# Patient Record
Sex: MALE | Race: WHITE | Employment: UNEMPLOYED | URBAN - METROPOLITAN AREA
[De-identification: names, ages, dates, MRNs, and addresses within clinical notes are randomized per-mention and may not be internally consistent; named-entity substitution may affect disease eponyms.]

---

## 2017-07-14 PROBLEM — M62.82 NON-TRAUMATIC RHABDOMYOLYSIS: Status: ACTIVE | Noted: 2017-07-14

## 2017-07-14 PROBLEM — E87.5 HYPERKALEMIA: Status: ACTIVE | Noted: 2017-07-14

## 2017-07-14 PROBLEM — F14.10 COCAINE ABUSE (HCC): Status: ACTIVE | Noted: 2017-07-14

## 2017-07-14 PROBLEM — F11.10 HEROIN ABUSE (HCC): Status: ACTIVE | Noted: 2017-07-14

## 2017-07-14 PROBLEM — N17.9 ACUTE KIDNEY INJURY (HCC): Status: ACTIVE | Noted: 2017-07-14

## 2020-01-01 ENCOUNTER — HOSPITAL ENCOUNTER (EMERGENCY)
Age: 50
Discharge: HOME | End: 2020-12-04
Payer: MEDICAID

## 2020-01-01 ENCOUNTER — HOSPITAL ENCOUNTER (OUTPATIENT)
Dept: RADIATION ONCOLOGY | Age: 50
Discharge: HOME | End: 2020-12-23
Payer: MEDICAID

## 2020-01-01 ENCOUNTER — HOSPITAL ENCOUNTER (OUTPATIENT)
Dept: RADIATION ONCOLOGY | Age: 50
Discharge: HOME | End: 2020-12-29
Attending: RADIOLOGY
Payer: MEDICAID

## 2020-01-01 ENCOUNTER — HOSPITAL ENCOUNTER (OUTPATIENT)
Dept: RADIATION ONCOLOGY | Age: 50
Discharge: HOME | End: 2020-12-23
Attending: RADIOLOGY
Payer: MEDICAID

## 2020-01-01 ENCOUNTER — APPOINTMENT (OUTPATIENT)
Dept: GENERAL RADIOLOGY | Age: 50
DRG: 136 | End: 2020-01-01
Payer: MEDICAID

## 2020-01-01 ENCOUNTER — HOSPITAL ENCOUNTER (OUTPATIENT)
Dept: RADIATION ONCOLOGY | Age: 50
Discharge: HOME | End: 2020-12-30
Attending: RADIOLOGY
Payer: MEDICAID

## 2020-01-01 ENCOUNTER — HOSPITAL ENCOUNTER (OUTPATIENT)
Dept: RADIATION ONCOLOGY | Age: 50
Discharge: HOME | End: 2020-12-22
Attending: RADIOLOGY
Payer: MEDICAID

## 2020-01-01 ENCOUNTER — APPOINTMENT (OUTPATIENT)
Dept: NUCLEAR MEDICINE | Age: 50
DRG: 136 | End: 2020-01-01
Payer: MEDICAID

## 2020-01-01 ENCOUNTER — APPOINTMENT (OUTPATIENT)
Dept: CT IMAGING | Age: 50
DRG: 136 | End: 2020-01-01
Payer: MEDICAID

## 2020-01-01 ENCOUNTER — HOSPITAL ENCOUNTER (OUTPATIENT)
Dept: RADIATION ONCOLOGY | Age: 50
Discharge: HOME | End: 2020-12-30
Payer: MEDICAID

## 2020-01-01 ENCOUNTER — HOSPITAL ENCOUNTER (INPATIENT)
Age: 50
LOS: 20 days | Discharge: HOSPICE | DRG: 136 | End: 2021-01-05
Attending: EMERGENCY MEDICINE | Admitting: INTERNAL MEDICINE
Payer: MEDICAID

## 2020-01-01 ENCOUNTER — ANESTHESIA (OUTPATIENT)
Dept: ENDOSCOPY | Age: 50
DRG: 136 | End: 2020-01-01
Payer: MEDICAID

## 2020-01-01 ENCOUNTER — APPOINTMENT (OUTPATIENT)
Dept: MRI IMAGING | Age: 50
DRG: 136 | End: 2020-01-01
Payer: MEDICAID

## 2020-01-01 ENCOUNTER — ANESTHESIA EVENT (OUTPATIENT)
Dept: ENDOSCOPY | Age: 50
DRG: 136 | End: 2020-01-01
Payer: MEDICAID

## 2020-01-01 ENCOUNTER — HOSPITAL ENCOUNTER (OUTPATIENT)
Dept: RADIATION ONCOLOGY | Age: 50
Discharge: HOME | End: 2020-12-21
Payer: MEDICAID

## 2020-01-01 ENCOUNTER — TELEPHONE (OUTPATIENT)
Dept: CASE MANAGEMENT | Age: 50
End: 2020-01-01

## 2020-01-01 ENCOUNTER — HOSPITAL ENCOUNTER (OUTPATIENT)
Dept: RADIATION ONCOLOGY | Age: 50
Discharge: HOME | End: 2020-12-21
Attending: RADIOLOGY
Payer: MEDICAID

## 2020-01-01 ENCOUNTER — HOSPITAL ENCOUNTER (OUTPATIENT)
Dept: RADIATION ONCOLOGY | Age: 50
Discharge: HOME | End: 2020-12-31
Attending: RADIOLOGY
Payer: MEDICAID

## 2020-01-01 ENCOUNTER — HOSPITAL ENCOUNTER (OUTPATIENT)
Dept: RADIATION ONCOLOGY | Age: 50
Discharge: HOME | End: 2020-12-28
Attending: RADIOLOGY
Payer: MEDICAID

## 2020-01-01 VITALS
DIASTOLIC BLOOD PRESSURE: 84 MMHG | TEMPERATURE: 97.6 F | RESPIRATION RATE: 22 BRPM | SYSTOLIC BLOOD PRESSURE: 162 MMHG | HEART RATE: 62 BPM

## 2020-01-01 VITALS
TEMPERATURE: 97.1 F | DIASTOLIC BLOOD PRESSURE: 78 MMHG | RESPIRATION RATE: 16 BRPM | OXYGEN SATURATION: 98 % | SYSTOLIC BLOOD PRESSURE: 139 MMHG | HEART RATE: 88 BPM | HEIGHT: 67 IN | WEIGHT: 212 LBS | BODY MASS INDEX: 33.27 KG/M2

## 2020-01-01 VITALS — TEMPERATURE: 95.5 F | SYSTOLIC BLOOD PRESSURE: 114 MMHG | OXYGEN SATURATION: 99 % | DIASTOLIC BLOOD PRESSURE: 68 MMHG

## 2020-01-01 DIAGNOSIS — R91.8 LUNG MASS: ICD-10-CM

## 2020-01-01 DIAGNOSIS — C79.51 MALIGNANT NEOPLASM METASTATIC TO BONE (HCC): Primary | ICD-10-CM

## 2020-01-01 DIAGNOSIS — T15.90XA FOREIGN BODY, EYE, UNSPECIFIED LATERALITY, INITIAL ENCOUNTER: ICD-10-CM

## 2020-01-01 DIAGNOSIS — C79.51 SECONDARY CANCER OF BONE (HCC): Primary | ICD-10-CM

## 2020-01-01 DIAGNOSIS — M84.58XA PATHOLOGICAL FRACTURE OF VERTEBRA DUE TO NEOPLASTIC DISEASE, INITIAL ENCOUNTER: ICD-10-CM

## 2020-01-01 LAB
ACETAMINOPHEN LEVEL: <5 MCG/ML (ref 10–30)
ALBUMIN SERPL-MCNC: 3 G/DL (ref 3.5–5.2)
ALBUMIN SERPL-MCNC: 3.1 G/DL (ref 3.5–5.2)
ALBUMIN SERPL-MCNC: 3.5 G/DL (ref 3.5–5.2)
ALBUMIN SERPL-MCNC: 3.5 G/DL (ref 3.5–5.2)
ALBUMIN SERPL-MCNC: 3.6 G/DL (ref 3.5–5.2)
ALBUMIN SERPL-MCNC: 3.7 G/DL (ref 3.5–5.2)
ALBUMIN SERPL-MCNC: 3.8 G/DL (ref 3.5–5.2)
ALBUMIN SERPL-MCNC: 3.9 G/DL (ref 3.5–5.2)
ALP BLD-CCNC: 64 U/L (ref 40–129)
ALP BLD-CCNC: 65 U/L (ref 40–129)
ALP BLD-CCNC: 66 U/L (ref 40–129)
ALP BLD-CCNC: 67 U/L (ref 40–129)
ALP BLD-CCNC: 67 U/L (ref 40–129)
ALP BLD-CCNC: 68 U/L (ref 40–129)
ALP BLD-CCNC: 68 U/L (ref 40–129)
ALP BLD-CCNC: 69 U/L (ref 40–129)
ALP BLD-CCNC: 71 U/L (ref 40–129)
ALP BLD-CCNC: 71 U/L (ref 40–129)
ALP BLD-CCNC: 73 U/L (ref 40–129)
ALP BLD-CCNC: 73 U/L (ref 40–129)
ALP BLD-CCNC: 76 U/L (ref 40–129)
ALP BLD-CCNC: 77 U/L (ref 40–129)
ALT SERPL-CCNC: 105 U/L (ref 0–40)
ALT SERPL-CCNC: 125 U/L (ref 0–40)
ALT SERPL-CCNC: 148 U/L (ref 0–40)
ALT SERPL-CCNC: 153 U/L (ref 0–40)
ALT SERPL-CCNC: 162 U/L (ref 0–40)
ALT SERPL-CCNC: 176 U/L (ref 0–40)
ALT SERPL-CCNC: 19 U/L (ref 0–40)
ALT SERPL-CCNC: 19 U/L (ref 0–40)
ALT SERPL-CCNC: 24 U/L (ref 0–40)
ALT SERPL-CCNC: 24 U/L (ref 0–40)
ALT SERPL-CCNC: 25 U/L (ref 0–40)
ALT SERPL-CCNC: 25 U/L (ref 0–40)
ALT SERPL-CCNC: 27 U/L (ref 0–40)
ALT SERPL-CCNC: 33 U/L (ref 0–40)
ALT SERPL-CCNC: 48 U/L (ref 0–40)
ALT SERPL-CCNC: 49 U/L (ref 0–40)
ALT SERPL-CCNC: 51 U/L (ref 0–40)
ALT SERPL-CCNC: 54 U/L (ref 0–40)
ALT SERPL-CCNC: 57 U/L (ref 0–40)
AMORPHOUS: ABNORMAL
AMPHETAMINE SCREEN, URINE: NOT DETECTED
ANION GAP SERPL CALCULATED.3IONS-SCNC: 10 MMOL/L (ref 7–16)
ANION GAP SERPL CALCULATED.3IONS-SCNC: 11 MMOL/L (ref 7–16)
ANION GAP SERPL CALCULATED.3IONS-SCNC: 12 MMOL/L (ref 7–16)
ANION GAP SERPL CALCULATED.3IONS-SCNC: 12 MMOL/L (ref 7–16)
ANION GAP SERPL CALCULATED.3IONS-SCNC: 13 MMOL/L (ref 7–16)
ANION GAP SERPL CALCULATED.3IONS-SCNC: 14 MMOL/L (ref 7–16)
ANION GAP SERPL CALCULATED.3IONS-SCNC: 15 MMOL/L (ref 7–16)
ANION GAP SERPL CALCULATED.3IONS-SCNC: 6 MMOL/L (ref 7–16)
ANION GAP SERPL CALCULATED.3IONS-SCNC: 7 MMOL/L (ref 7–16)
ANION GAP SERPL CALCULATED.3IONS-SCNC: 7 MMOL/L (ref 7–16)
ANION GAP SERPL CALCULATED.3IONS-SCNC: 8 MMOL/L (ref 7–16)
ANION GAP SERPL CALCULATED.3IONS-SCNC: 8 MMOL/L (ref 7–16)
ANION GAP SERPL CALCULATED.3IONS-SCNC: 9 MMOL/L (ref 7–16)
ANISOCYTOSIS: ABNORMAL
AST SERPL-CCNC: 102 U/L (ref 0–39)
AST SERPL-CCNC: 13 U/L (ref 0–39)
AST SERPL-CCNC: 14 U/L (ref 0–39)
AST SERPL-CCNC: 17 U/L (ref 0–39)
AST SERPL-CCNC: 19 U/L (ref 0–39)
AST SERPL-CCNC: 23 U/L (ref 0–39)
AST SERPL-CCNC: 27 U/L (ref 0–39)
AST SERPL-CCNC: 28 U/L (ref 0–39)
AST SERPL-CCNC: 28 U/L (ref 0–39)
AST SERPL-CCNC: 32 U/L (ref 0–39)
AST SERPL-CCNC: 33 U/L (ref 0–39)
AST SERPL-CCNC: 36 U/L (ref 0–39)
AST SERPL-CCNC: 37 U/L (ref 0–39)
AST SERPL-CCNC: 40 U/L (ref 0–39)
AST SERPL-CCNC: 40 U/L (ref 0–39)
AST SERPL-CCNC: 44 U/L (ref 0–39)
AST SERPL-CCNC: 52 U/L (ref 0–39)
AST SERPL-CCNC: 68 U/L (ref 0–39)
AST SERPL-CCNC: 90 U/L (ref 0–39)
BACTERIA: ABNORMAL /HPF
BARBITURATE SCREEN URINE: NOT DETECTED
BASOPHILS ABSOLUTE: 0 E9/L (ref 0–0.2)
BASOPHILS ABSOLUTE: 0.01 E9/L (ref 0–0.2)
BASOPHILS ABSOLUTE: 0.02 E9/L (ref 0–0.2)
BASOPHILS ABSOLUTE: 0.03 E9/L (ref 0–0.2)
BASOPHILS ABSOLUTE: 0.04 E9/L (ref 0–0.2)
BASOPHILS ABSOLUTE: 0.05 E9/L (ref 0–0.2)
BASOPHILS ABSOLUTE: 0.06 E9/L (ref 0–0.2)
BASOPHILS ABSOLUTE: 0.07 E9/L (ref 0–0.2)
BASOPHILS ABSOLUTE: 0.15 E9/L (ref 0–0.2)
BASOPHILS RELATIVE PERCENT: 0 % (ref 0–2)
BASOPHILS RELATIVE PERCENT: 0.1 % (ref 0–2)
BASOPHILS RELATIVE PERCENT: 0.2 % (ref 0–2)
BASOPHILS RELATIVE PERCENT: 0.3 % (ref 0–2)
BASOPHILS RELATIVE PERCENT: 0.3 % (ref 0–2)
BASOPHILS RELATIVE PERCENT: 0.4 % (ref 0–2)
BASOPHILS RELATIVE PERCENT: 0.5 % (ref 0–2)
BASOPHILS RELATIVE PERCENT: 0.5 % (ref 0–2)
BENZODIAZEPINE SCREEN, URINE: NOT DETECTED
BILIRUB SERPL-MCNC: 0.4 MG/DL (ref 0–1.2)
BILIRUB SERPL-MCNC: 0.5 MG/DL (ref 0–1.2)
BILIRUB SERPL-MCNC: 0.6 MG/DL (ref 0–1.2)
BILIRUB SERPL-MCNC: 0.7 MG/DL (ref 0–1.2)
BILIRUB SERPL-MCNC: 0.9 MG/DL (ref 0–1.2)
BILIRUBIN URINE: NEGATIVE
BLOOD CULTURE, ROUTINE: NORMAL
BLOOD, URINE: NEGATIVE
BUN BLDV-MCNC: 20 MG/DL (ref 6–20)
BUN BLDV-MCNC: 21 MG/DL (ref 6–20)
BUN BLDV-MCNC: 23 MG/DL (ref 6–20)
BUN BLDV-MCNC: 26 MG/DL (ref 6–20)
BUN BLDV-MCNC: 27 MG/DL (ref 6–20)
BUN BLDV-MCNC: 28 MG/DL (ref 6–20)
BUN BLDV-MCNC: 28 MG/DL (ref 6–20)
BUN BLDV-MCNC: 29 MG/DL (ref 6–20)
BUN BLDV-MCNC: 29 MG/DL (ref 6–20)
BUN BLDV-MCNC: 30 MG/DL (ref 6–20)
BUN BLDV-MCNC: 31 MG/DL (ref 6–20)
BUN BLDV-MCNC: 32 MG/DL (ref 6–20)
BUN BLDV-MCNC: 32 MG/DL (ref 6–20)
BUN BLDV-MCNC: 33 MG/DL (ref 6–20)
BUN BLDV-MCNC: 34 MG/DL (ref 6–20)
BUN BLDV-MCNC: 37 MG/DL (ref 6–20)
BUN BLDV-MCNC: 40 MG/DL (ref 6–20)
BUN BLDV-MCNC: 41 MG/DL (ref 6–20)
BURR CELLS: ABNORMAL
C-REACTIVE PROTEIN: 4.6 MG/DL (ref 0–0.4)
CA 19-9: 123 U/ML (ref 0–37)
CALCIUM IONIZED: 1.44 MMOL/L (ref 1.15–1.33)
CALCIUM SERPL-MCNC: 10 MG/DL (ref 8.6–10.2)
CALCIUM SERPL-MCNC: 10 MG/DL (ref 8.6–10.2)
CALCIUM SERPL-MCNC: 10.1 MG/DL (ref 8.6–10.2)
CALCIUM SERPL-MCNC: 10.2 MG/DL (ref 8.6–10.2)
CALCIUM SERPL-MCNC: 10.3 MG/DL (ref 8.6–10.2)
CALCIUM SERPL-MCNC: 10.3 MG/DL (ref 8.6–10.2)
CALCIUM SERPL-MCNC: 10.4 MG/DL (ref 8.6–10.2)
CALCIUM SERPL-MCNC: 10.6 MG/DL (ref 8.6–10.2)
CALCIUM SERPL-MCNC: 10.8 MG/DL (ref 8.6–10.2)
CALCIUM SERPL-MCNC: 11 MG/DL (ref 8.6–10.2)
CALCIUM SERPL-MCNC: 11.1 MG/DL (ref 8.6–10.2)
CALCIUM SERPL-MCNC: 9.6 MG/DL (ref 8.6–10.2)
CALCIUM SERPL-MCNC: 9.8 MG/DL (ref 8.6–10.2)
CALCIUM SERPL-MCNC: 9.9 MG/DL (ref 8.6–10.2)
CANNABINOID SCREEN URINE: NOT DETECTED
CEA: 158.7 NG/ML (ref 0–5.2)
CHLORIDE BLD-SCNC: 100 MMOL/L (ref 98–107)
CHLORIDE BLD-SCNC: 100 MMOL/L (ref 98–107)
CHLORIDE BLD-SCNC: 91 MMOL/L (ref 98–107)
CHLORIDE BLD-SCNC: 91 MMOL/L (ref 98–107)
CHLORIDE BLD-SCNC: 92 MMOL/L (ref 98–107)
CHLORIDE BLD-SCNC: 93 MMOL/L (ref 98–107)
CHLORIDE BLD-SCNC: 94 MMOL/L (ref 98–107)
CHLORIDE BLD-SCNC: 95 MMOL/L (ref 98–107)
CHLORIDE BLD-SCNC: 96 MMOL/L (ref 98–107)
CHLORIDE BLD-SCNC: 99 MMOL/L (ref 98–107)
CHROMOGRANIN A: 161 NG/ML (ref 0–103)
CLARITY: ABNORMAL
CO2: 22 MMOL/L (ref 22–29)
CO2: 25 MMOL/L (ref 22–29)
CO2: 26 MMOL/L (ref 22–29)
CO2: 27 MMOL/L (ref 22–29)
CO2: 27 MMOL/L (ref 22–29)
CO2: 28 MMOL/L (ref 22–29)
CO2: 29 MMOL/L (ref 22–29)
CO2: 30 MMOL/L (ref 22–29)
CO2: 31 MMOL/L (ref 22–29)
CO2: 32 MMOL/L (ref 22–29)
CO2: 33 MMOL/L (ref 22–29)
CO2: 34 MMOL/L (ref 22–29)
CO2: 37 MMOL/L (ref 22–29)
COCAINE METABOLITE SCREEN URINE: NOT DETECTED
COLOR: YELLOW
CREAT SERPL-MCNC: 0.7 MG/DL (ref 0.7–1.2)
CREAT SERPL-MCNC: 0.7 MG/DL (ref 0.7–1.2)
CREAT SERPL-MCNC: 0.8 MG/DL (ref 0.7–1.2)
CREAT SERPL-MCNC: 0.9 MG/DL (ref 0.7–1.2)
CREAT SERPL-MCNC: 1 MG/DL (ref 0.7–1.2)
CREAT SERPL-MCNC: 1.1 MG/DL (ref 0.7–1.2)
CREAT SERPL-MCNC: 1.2 MG/DL (ref 0.7–1.2)
CULTURE, BLOOD 2: NORMAL
CULTURE, RESPIRATORY: NORMAL
EKG ATRIAL RATE: 100 BPM
EKG ATRIAL RATE: 101 BPM
EKG ATRIAL RATE: 142 BPM
EKG P AXIS: 58 DEGREES
EKG P AXIS: 63 DEGREES
EKG P AXIS: 73 DEGREES
EKG P-R INTERVAL: 116 MS
EKG P-R INTERVAL: 116 MS
EKG P-R INTERVAL: 134 MS
EKG Q-T INTERVAL: 282 MS
EKG Q-T INTERVAL: 324 MS
EKG Q-T INTERVAL: 326 MS
EKG QRS DURATION: 90 MS
EKG QTC CALCULATION (BAZETT): 420 MS
EKG QTC CALCULATION (BAZETT): 420 MS
EKG QTC CALCULATION (BAZETT): 433 MS
EKG R AXIS: 64 DEGREES
EKG R AXIS: 66 DEGREES
EKG R AXIS: 85 DEGREES
EKG T AXIS: 44 DEGREES
EKG T AXIS: 52 DEGREES
EKG T AXIS: 59 DEGREES
EKG VENTRICULAR RATE: 100 BPM
EKG VENTRICULAR RATE: 101 BPM
EKG VENTRICULAR RATE: 142 BPM
EOSINOPHILS ABSOLUTE: 0 E9/L (ref 0.05–0.5)
EOSINOPHILS ABSOLUTE: 0.01 E9/L (ref 0.05–0.5)
EOSINOPHILS ABSOLUTE: 0.02 E9/L (ref 0.05–0.5)
EOSINOPHILS ABSOLUTE: 0.1 E9/L (ref 0.05–0.5)
EOSINOPHILS ABSOLUTE: 0.15 E9/L (ref 0.05–0.5)
EOSINOPHILS ABSOLUTE: 0.22 E9/L (ref 0.05–0.5)
EOSINOPHILS ABSOLUTE: 0.29 E9/L (ref 0.05–0.5)
EOSINOPHILS RELATIVE PERCENT: 0 % (ref 0–6)
EOSINOPHILS RELATIVE PERCENT: 0.1 % (ref 0–6)
EOSINOPHILS RELATIVE PERCENT: 0.1 % (ref 0–6)
EOSINOPHILS RELATIVE PERCENT: 0.3 % (ref 0–6)
EOSINOPHILS RELATIVE PERCENT: 0.6 % (ref 0–6)
EOSINOPHILS RELATIVE PERCENT: 0.8 % (ref 0–6)
EOSINOPHILS RELATIVE PERCENT: 0.9 % (ref 0–6)
EOSINOPHILS RELATIVE PERCENT: 1.5 % (ref 0–6)
EOSINOPHILS RELATIVE PERCENT: 1.7 % (ref 0–6)
EPITHELIAL CELLS, UA: ABNORMAL /HPF
ETHANOL: <10 MG/DL (ref 0–0.08)
FENTANYL SCREEN, URINE: NOT DETECTED
GFR AFRICAN AMERICAN: >60
GFR NON-AFRICAN AMERICAN: >60 ML/MIN/1.73
GLUCOSE BLD-MCNC: 100 MG/DL (ref 74–99)
GLUCOSE BLD-MCNC: 100 MG/DL (ref 74–99)
GLUCOSE BLD-MCNC: 103 MG/DL (ref 74–99)
GLUCOSE BLD-MCNC: 103 MG/DL (ref 74–99)
GLUCOSE BLD-MCNC: 105 MG/DL (ref 74–99)
GLUCOSE BLD-MCNC: 115 MG/DL (ref 74–99)
GLUCOSE BLD-MCNC: 115 MG/DL (ref 74–99)
GLUCOSE BLD-MCNC: 118 MG/DL (ref 74–99)
GLUCOSE BLD-MCNC: 122 MG/DL (ref 74–99)
GLUCOSE BLD-MCNC: 130 MG/DL (ref 74–99)
GLUCOSE BLD-MCNC: 132 MG/DL (ref 74–99)
GLUCOSE BLD-MCNC: 136 MG/DL (ref 74–99)
GLUCOSE BLD-MCNC: 138 MG/DL (ref 74–99)
GLUCOSE BLD-MCNC: 152 MG/DL (ref 74–99)
GLUCOSE BLD-MCNC: 158 MG/DL (ref 74–99)
GLUCOSE BLD-MCNC: 85 MG/DL (ref 74–99)
GLUCOSE BLD-MCNC: 91 MG/DL (ref 74–99)
GLUCOSE BLD-MCNC: 95 MG/DL (ref 74–99)
GLUCOSE BLD-MCNC: 98 MG/DL (ref 74–99)
GLUCOSE BLD-MCNC: 98 MG/DL (ref 74–99)
GLUCOSE URINE: NEGATIVE MG/DL
GRAM STAIN ORDERABLE: NORMAL
HCT VFR BLD CALC: 35.1 % (ref 37–54)
HCT VFR BLD CALC: 36.1 % (ref 37–54)
HCT VFR BLD CALC: 36.7 % (ref 37–54)
HCT VFR BLD CALC: 36.8 % (ref 37–54)
HCT VFR BLD CALC: 36.9 % (ref 37–54)
HCT VFR BLD CALC: 37 % (ref 37–54)
HCT VFR BLD CALC: 37.1 % (ref 37–54)
HCT VFR BLD CALC: 37.1 % (ref 37–54)
HCT VFR BLD CALC: 37.7 % (ref 37–54)
HCT VFR BLD CALC: 38.1 % (ref 37–54)
HCT VFR BLD CALC: 38.3 % (ref 37–54)
HCT VFR BLD CALC: 38.4 % (ref 37–54)
HCT VFR BLD CALC: 39 % (ref 37–54)
HCT VFR BLD CALC: 39.7 % (ref 37–54)
HCT VFR BLD CALC: 39.7 % (ref 37–54)
HCT VFR BLD CALC: 40.1 % (ref 37–54)
HCT VFR BLD CALC: 41.1 % (ref 37–54)
HCT VFR BLD CALC: 41.7 % (ref 37–54)
HCT VFR BLD CALC: 42.8 % (ref 37–54)
HEMOGLOBIN: 12 G/DL (ref 12.5–16.5)
HEMOGLOBIN: 12.2 G/DL (ref 12.5–16.5)
HEMOGLOBIN: 12.2 G/DL (ref 12.5–16.5)
HEMOGLOBIN: 12.3 G/DL (ref 12.5–16.5)
HEMOGLOBIN: 12.4 G/DL (ref 12.5–16.5)
HEMOGLOBIN: 12.4 G/DL (ref 12.5–16.5)
HEMOGLOBIN: 12.5 G/DL (ref 12.5–16.5)
HEMOGLOBIN: 12.5 G/DL (ref 12.5–16.5)
HEMOGLOBIN: 12.7 G/DL (ref 12.5–16.5)
HEMOGLOBIN: 12.7 G/DL (ref 12.5–16.5)
HEMOGLOBIN: 12.8 G/DL (ref 12.5–16.5)
HEMOGLOBIN: 12.9 G/DL (ref 12.5–16.5)
HEMOGLOBIN: 13.2 G/DL (ref 12.5–16.5)
HEMOGLOBIN: 13.3 G/DL (ref 12.5–16.5)
HEMOGLOBIN: 13.7 G/DL (ref 12.5–16.5)
HEMOGLOBIN: 13.8 G/DL (ref 12.5–16.5)
HEMOGLOBIN: 14.1 G/DL (ref 12.5–16.5)
HEMOGLOBIN: 14.3 G/DL (ref 12.5–16.5)
HEMOGLOBIN: 14.6 G/DL (ref 12.5–16.5)
HYPOCHROMIA: ABNORMAL
IMMATURE GRANULOCYTES #: 0.14 E9/L
IMMATURE GRANULOCYTES #: 0.15 E9/L
IMMATURE GRANULOCYTES #: 0.15 E9/L
IMMATURE GRANULOCYTES #: 0.16 E9/L
IMMATURE GRANULOCYTES #: 0.17 E9/L
IMMATURE GRANULOCYTES #: 0.18 E9/L
IMMATURE GRANULOCYTES #: 0.24 E9/L
IMMATURE GRANULOCYTES #: 0.27 E9/L
IMMATURE GRANULOCYTES #: 0.52 E9/L
IMMATURE GRANULOCYTES #: 1.11 E9/L
IMMATURE GRANULOCYTES %: 0.7 % (ref 0–5)
IMMATURE GRANULOCYTES %: 0.7 % (ref 0–5)
IMMATURE GRANULOCYTES %: 0.8 % (ref 0–5)
IMMATURE GRANULOCYTES %: 0.9 % (ref 0–5)
IMMATURE GRANULOCYTES %: 0.9 % (ref 0–5)
IMMATURE GRANULOCYTES %: 1 % (ref 0–5)
IMMATURE GRANULOCYTES %: 1.2 % (ref 0–5)
IMMATURE GRANULOCYTES %: 1.4 % (ref 0–5)
IMMATURE GRANULOCYTES %: 1.9 % (ref 0–5)
IMMATURE GRANULOCYTES %: 5 % (ref 0–5)
INR BLD: 1.1
KETONES, URINE: NEGATIVE MG/DL
LACTIC ACID: 1.2 MMOL/L (ref 0.5–2.2)
LEUKOCYTE ESTERASE, URINE: ABNORMAL
LYMPHOCYTES ABSOLUTE: 0.15 E9/L (ref 1.5–4)
LYMPHOCYTES ABSOLUTE: 0.15 E9/L (ref 1.5–4)
LYMPHOCYTES ABSOLUTE: 0.18 E9/L (ref 1.5–4)
LYMPHOCYTES ABSOLUTE: 0.22 E9/L (ref 1.5–4)
LYMPHOCYTES ABSOLUTE: 0.22 E9/L (ref 1.5–4)
LYMPHOCYTES ABSOLUTE: 0.28 E9/L (ref 1.5–4)
LYMPHOCYTES ABSOLUTE: 0.44 E9/L (ref 1.5–4)
LYMPHOCYTES ABSOLUTE: 0.5 E9/L (ref 1.5–4)
LYMPHOCYTES ABSOLUTE: 0.51 E9/L (ref 1.5–4)
LYMPHOCYTES ABSOLUTE: 0.61 E9/L (ref 1.5–4)
LYMPHOCYTES ABSOLUTE: 0.65 E9/L (ref 1.5–4)
LYMPHOCYTES ABSOLUTE: 0.71 E9/L (ref 1.5–4)
LYMPHOCYTES ABSOLUTE: 0.75 E9/L (ref 1.5–4)
LYMPHOCYTES ABSOLUTE: 0.78 E9/L (ref 1.5–4)
LYMPHOCYTES ABSOLUTE: 0.93 E9/L (ref 1.5–4)
LYMPHOCYTES ABSOLUTE: 1.22 E9/L (ref 1.5–4)
LYMPHOCYTES ABSOLUTE: 1.35 E9/L (ref 1.5–4)
LYMPHOCYTES ABSOLUTE: 1.73 E9/L (ref 1.5–4)
LYMPHOCYTES ABSOLUTE: 1.76 E9/L (ref 1.5–4)
LYMPHOCYTES RELATIVE PERCENT: 0.8 % (ref 20–42)
LYMPHOCYTES RELATIVE PERCENT: 0.8 % (ref 20–42)
LYMPHOCYTES RELATIVE PERCENT: 0.9 % (ref 20–42)
LYMPHOCYTES RELATIVE PERCENT: 0.9 % (ref 20–42)
LYMPHOCYTES RELATIVE PERCENT: 1 % (ref 20–42)
LYMPHOCYTES RELATIVE PERCENT: 1.3 % (ref 20–42)
LYMPHOCYTES RELATIVE PERCENT: 1.7 % (ref 20–42)
LYMPHOCYTES RELATIVE PERCENT: 10.4 % (ref 20–42)
LYMPHOCYTES RELATIVE PERCENT: 12.2 % (ref 20–42)
LYMPHOCYTES RELATIVE PERCENT: 3.3 % (ref 20–42)
LYMPHOCYTES RELATIVE PERCENT: 3.4 % (ref 20–42)
LYMPHOCYTES RELATIVE PERCENT: 3.5 % (ref 20–42)
LYMPHOCYTES RELATIVE PERCENT: 3.6 % (ref 20–42)
LYMPHOCYTES RELATIVE PERCENT: 4 % (ref 20–42)
LYMPHOCYTES RELATIVE PERCENT: 5.3 % (ref 20–42)
LYMPHOCYTES RELATIVE PERCENT: 5.4 % (ref 20–42)
LYMPHOCYTES RELATIVE PERCENT: 7.2 % (ref 20–42)
Lab: ABNORMAL
MAGNESIUM: 1.9 MG/DL (ref 1.6–2.6)
MAGNESIUM: 1.9 MG/DL (ref 1.6–2.6)
MAGNESIUM: 2.1 MG/DL (ref 1.6–2.6)
MAGNESIUM: 2.2 MG/DL (ref 1.6–2.6)
MCH RBC QN AUTO: 30.8 PG (ref 26–35)
MCH RBC QN AUTO: 30.9 PG (ref 26–35)
MCH RBC QN AUTO: 31 PG (ref 26–35)
MCH RBC QN AUTO: 31.1 PG (ref 26–35)
MCH RBC QN AUTO: 31.2 PG (ref 26–35)
MCH RBC QN AUTO: 31.2 PG (ref 26–35)
MCH RBC QN AUTO: 31.3 PG (ref 26–35)
MCH RBC QN AUTO: 31.4 PG (ref 26–35)
MCH RBC QN AUTO: 31.4 PG (ref 26–35)
MCH RBC QN AUTO: 31.5 PG (ref 26–35)
MCH RBC QN AUTO: 31.6 PG (ref 26–35)
MCH RBC QN AUTO: 31.7 PG (ref 26–35)
MCH RBC QN AUTO: 31.8 PG (ref 26–35)
MCH RBC QN AUTO: 31.8 PG (ref 26–35)
MCH RBC QN AUTO: 32.1 PG (ref 26–35)
MCHC RBC AUTO-ENTMCNC: 32 % (ref 32–34.5)
MCHC RBC AUTO-ENTMCNC: 32.3 % (ref 32–34.5)
MCHC RBC AUTO-ENTMCNC: 32.6 % (ref 32–34.5)
MCHC RBC AUTO-ENTMCNC: 32.9 % (ref 32–34.5)
MCHC RBC AUTO-ENTMCNC: 33.1 % (ref 32–34.5)
MCHC RBC AUTO-ENTMCNC: 33.2 % (ref 32–34.5)
MCHC RBC AUTO-ENTMCNC: 33.6 % (ref 32–34.5)
MCHC RBC AUTO-ENTMCNC: 33.8 % (ref 32–34.5)
MCHC RBC AUTO-ENTMCNC: 33.8 % (ref 32–34.5)
MCHC RBC AUTO-ENTMCNC: 34 % (ref 32–34.5)
MCHC RBC AUTO-ENTMCNC: 34.1 % (ref 32–34.5)
MCHC RBC AUTO-ENTMCNC: 34.2 % (ref 32–34.5)
MCHC RBC AUTO-ENTMCNC: 34.3 % (ref 32–34.5)
MCHC RBC AUTO-ENTMCNC: 34.4 % (ref 32–34.5)
MCHC RBC AUTO-ENTMCNC: 34.5 % (ref 32–34.5)
MCHC RBC AUTO-ENTMCNC: 34.5 % (ref 32–34.5)
MCHC RBC AUTO-ENTMCNC: 34.6 % (ref 32–34.5)
MCHC RBC AUTO-ENTMCNC: 35.2 % (ref 32–34.5)
MCHC RBC AUTO-ENTMCNC: 35.3 % (ref 32–34.5)
MCV RBC AUTO: 90 FL (ref 80–99.9)
MCV RBC AUTO: 91.1 FL (ref 80–99.9)
MCV RBC AUTO: 91.3 FL (ref 80–99.9)
MCV RBC AUTO: 91.4 FL (ref 80–99.9)
MCV RBC AUTO: 91.5 FL (ref 80–99.9)
MCV RBC AUTO: 91.5 FL (ref 80–99.9)
MCV RBC AUTO: 91.6 FL (ref 80–99.9)
MCV RBC AUTO: 92 FL (ref 80–99.9)
MCV RBC AUTO: 92.1 FL (ref 80–99.9)
MCV RBC AUTO: 92.4 FL (ref 80–99.9)
MCV RBC AUTO: 92.7 FL (ref 80–99.9)
MCV RBC AUTO: 93.9 FL (ref 80–99.9)
MCV RBC AUTO: 94 FL (ref 80–99.9)
MCV RBC AUTO: 94 FL (ref 80–99.9)
MCV RBC AUTO: 94.4 FL (ref 80–99.9)
MCV RBC AUTO: 95.1 FL (ref 80–99.9)
MCV RBC AUTO: 96.2 FL (ref 80–99.9)
MCV RBC AUTO: 97.5 FL (ref 80–99.9)
MCV RBC AUTO: 98.5 FL (ref 80–99.9)
METAMYELOCYTES RELATIVE PERCENT: 0.9 % (ref 0–1)
METHADONE SCREEN, URINE: NOT DETECTED
MONOCYTES ABSOLUTE: 0.41 E9/L (ref 0.1–0.95)
MONOCYTES ABSOLUTE: 0.44 E9/L (ref 0.1–0.95)
MONOCYTES ABSOLUTE: 0.58 E9/L (ref 0.1–0.95)
MONOCYTES ABSOLUTE: 0.63 E9/L (ref 0.1–0.95)
MONOCYTES ABSOLUTE: 0.86 E9/L (ref 0.1–0.95)
MONOCYTES ABSOLUTE: 1 E9/L (ref 0.1–0.95)
MONOCYTES ABSOLUTE: 1.35 E9/L (ref 0.1–0.95)
MONOCYTES ABSOLUTE: 1.38 E9/L (ref 0.1–0.95)
MONOCYTES ABSOLUTE: 1.45 E9/L (ref 0.1–0.95)
MONOCYTES ABSOLUTE: 1.53 E9/L (ref 0.1–0.95)
MONOCYTES ABSOLUTE: 1.56 E9/L (ref 0.1–0.95)
MONOCYTES ABSOLUTE: 1.59 E9/L (ref 0.1–0.95)
MONOCYTES ABSOLUTE: 1.63 E9/L (ref 0.1–0.95)
MONOCYTES ABSOLUTE: 1.73 E9/L (ref 0.1–0.95)
MONOCYTES ABSOLUTE: 1.76 E9/L (ref 0.1–0.95)
MONOCYTES ABSOLUTE: 1.83 E9/L (ref 0.1–0.95)
MONOCYTES ABSOLUTE: 1.87 E9/L (ref 0.1–0.95)
MONOCYTES ABSOLUTE: 1.94 E9/L (ref 0.1–0.95)
MONOCYTES ABSOLUTE: 1.97 E9/L (ref 0.1–0.95)
MONOCYTES RELATIVE PERCENT: 10.4 % (ref 2–12)
MONOCYTES RELATIVE PERCENT: 10.8 % (ref 2–12)
MONOCYTES RELATIVE PERCENT: 2.8 % (ref 2–12)
MONOCYTES RELATIVE PERCENT: 3.5 % (ref 2–12)
MONOCYTES RELATIVE PERCENT: 3.7 % (ref 2–12)
MONOCYTES RELATIVE PERCENT: 4.2 % (ref 2–12)
MONOCYTES RELATIVE PERCENT: 4.4 % (ref 2–12)
MONOCYTES RELATIVE PERCENT: 5 % (ref 2–12)
MONOCYTES RELATIVE PERCENT: 6 % (ref 2–12)
MONOCYTES RELATIVE PERCENT: 7 % (ref 2–12)
MONOCYTES RELATIVE PERCENT: 7 % (ref 2–12)
MONOCYTES RELATIVE PERCENT: 7.4 % (ref 2–12)
MONOCYTES RELATIVE PERCENT: 7.7 % (ref 2–12)
MONOCYTES RELATIVE PERCENT: 8.5 % (ref 2–12)
MONOCYTES RELATIVE PERCENT: 8.7 % (ref 2–12)
MONOCYTES RELATIVE PERCENT: 8.7 % (ref 2–12)
MONOCYTES RELATIVE PERCENT: 8.9 % (ref 2–12)
MONOCYTES RELATIVE PERCENT: 9.4 % (ref 2–12)
MONOCYTES RELATIVE PERCENT: 9.7 % (ref 2–12)
NEUTROPHILS ABSOLUTE: 10.1 E9/L (ref 1.8–7.3)
NEUTROPHILS ABSOLUTE: 10.69 E9/L (ref 1.8–7.3)
NEUTROPHILS ABSOLUTE: 13.32 E9/L (ref 1.8–7.3)
NEUTROPHILS ABSOLUTE: 13.46 E9/L (ref 1.8–7.3)
NEUTROPHILS ABSOLUTE: 13.76 E9/L (ref 1.8–7.3)
NEUTROPHILS ABSOLUTE: 13.92 E9/L (ref 1.8–7.3)
NEUTROPHILS ABSOLUTE: 13.93 E9/L (ref 1.8–7.3)
NEUTROPHILS ABSOLUTE: 14.57 E9/L (ref 1.8–7.3)
NEUTROPHILS ABSOLUTE: 15.27 E9/L (ref 1.8–7.3)
NEUTROPHILS ABSOLUTE: 15.58 E9/L (ref 1.8–7.3)
NEUTROPHILS ABSOLUTE: 15.85 E9/L (ref 1.8–7.3)
NEUTROPHILS ABSOLUTE: 17.17 E9/L (ref 1.8–7.3)
NEUTROPHILS ABSOLUTE: 17.37 E9/L (ref 1.8–7.3)
NEUTROPHILS ABSOLUTE: 18.26 E9/L (ref 1.8–7.3)
NEUTROPHILS ABSOLUTE: 19.57 E9/L (ref 1.8–7.3)
NEUTROPHILS ABSOLUTE: 19.71 E9/L (ref 1.8–7.3)
NEUTROPHILS ABSOLUTE: 22.84 E9/L (ref 1.8–7.3)
NEUTROPHILS ABSOLUTE: 25.5 E9/L (ref 1.8–7.3)
NEUTROPHILS ABSOLUTE: 25.94 E9/L (ref 1.8–7.3)
NEUTROPHILS RELATIVE PERCENT: 74.1 % (ref 43–80)
NEUTROPHILS RELATIVE PERCENT: 77.4 % (ref 43–80)
NEUTROPHILS RELATIVE PERCENT: 81.3 % (ref 43–80)
NEUTROPHILS RELATIVE PERCENT: 84 % (ref 43–80)
NEUTROPHILS RELATIVE PERCENT: 85.3 % (ref 43–80)
NEUTROPHILS RELATIVE PERCENT: 86.6 % (ref 43–80)
NEUTROPHILS RELATIVE PERCENT: 87 % (ref 43–80)
NEUTROPHILS RELATIVE PERCENT: 87.6 % (ref 43–80)
NEUTROPHILS RELATIVE PERCENT: 87.8 % (ref 43–80)
NEUTROPHILS RELATIVE PERCENT: 87.8 % (ref 43–80)
NEUTROPHILS RELATIVE PERCENT: 88.1 % (ref 43–80)
NEUTROPHILS RELATIVE PERCENT: 90.4 % (ref 43–80)
NEUTROPHILS RELATIVE PERCENT: 91.2 % (ref 43–80)
NEUTROPHILS RELATIVE PERCENT: 92.2 % (ref 43–80)
NEUTROPHILS RELATIVE PERCENT: 92.3 % (ref 43–80)
NEUTROPHILS RELATIVE PERCENT: 92.7 % (ref 43–80)
NEUTROPHILS RELATIVE PERCENT: 93.1 % (ref 43–80)
NEUTROPHILS RELATIVE PERCENT: 93.5 % (ref 43–80)
NEUTROPHILS RELATIVE PERCENT: 95.7 % (ref 43–80)
NITRITE, URINE: NEGATIVE
OPIATE SCREEN URINE: POSITIVE
OVALOCYTES: ABNORMAL
OXYCODONE URINE: NOT DETECTED
PDW BLD-RTO: 12.5 FL (ref 11.5–15)
PDW BLD-RTO: 12.7 FL (ref 11.5–15)
PDW BLD-RTO: 12.7 FL (ref 11.5–15)
PDW BLD-RTO: 12.8 FL (ref 11.5–15)
PDW BLD-RTO: 12.8 FL (ref 11.5–15)
PDW BLD-RTO: 12.9 FL (ref 11.5–15)
PDW BLD-RTO: 12.9 FL (ref 11.5–15)
PDW BLD-RTO: 13 FL (ref 11.5–15)
PDW BLD-RTO: 13.2 FL (ref 11.5–15)
PDW BLD-RTO: 13.4 FL (ref 11.5–15)
PDW BLD-RTO: 13.5 FL (ref 11.5–15)
PDW BLD-RTO: 13.5 FL (ref 11.5–15)
PDW BLD-RTO: 13.7 FL (ref 11.5–15)
PDW BLD-RTO: 13.7 FL (ref 11.5–15)
PH UA: 7 (ref 5–9)
PHENCYCLIDINE SCREEN URINE: NOT DETECTED
PLATELET # BLD: 232 E9/L (ref 130–450)
PLATELET # BLD: 247 E9/L (ref 130–450)
PLATELET # BLD: 250 E9/L (ref 130–450)
PLATELET # BLD: 257 E9/L (ref 130–450)
PLATELET # BLD: 260 E9/L (ref 130–450)
PLATELET # BLD: 272 E9/L (ref 130–450)
PLATELET # BLD: 278 E9/L (ref 130–450)
PLATELET # BLD: 279 E9/L (ref 130–450)
PLATELET # BLD: 282 E9/L (ref 130–450)
PLATELET # BLD: 282 E9/L (ref 130–450)
PLATELET # BLD: 285 E9/L (ref 130–450)
PLATELET # BLD: 290 E9/L (ref 130–450)
PLATELET # BLD: 298 E9/L (ref 130–450)
PLATELET # BLD: 304 E9/L (ref 130–450)
PLATELET # BLD: 359 E9/L (ref 130–450)
PLATELET # BLD: 373 E9/L (ref 130–450)
PLATELET # BLD: 391 E9/L (ref 130–450)
PMV BLD AUTO: 10 FL (ref 7–12)
PMV BLD AUTO: 9.1 FL (ref 7–12)
PMV BLD AUTO: 9.1 FL (ref 7–12)
PMV BLD AUTO: 9.2 FL (ref 7–12)
PMV BLD AUTO: 9.3 FL (ref 7–12)
PMV BLD AUTO: 9.4 FL (ref 7–12)
PMV BLD AUTO: 9.4 FL (ref 7–12)
PMV BLD AUTO: 9.5 FL (ref 7–12)
PMV BLD AUTO: 9.6 FL (ref 7–12)
PMV BLD AUTO: 9.8 FL (ref 7–12)
POIKILOCYTES: ABNORMAL
POLYCHROMASIA: ABNORMAL
POTASSIUM REFLEX MAGNESIUM: 3.1 MMOL/L (ref 3.5–5)
POTASSIUM REFLEX MAGNESIUM: 3.2 MMOL/L (ref 3.5–5)
POTASSIUM REFLEX MAGNESIUM: 3.4 MMOL/L (ref 3.5–5)
POTASSIUM REFLEX MAGNESIUM: 3.4 MMOL/L (ref 3.5–5)
POTASSIUM REFLEX MAGNESIUM: 3.7 MMOL/L (ref 3.5–5)
POTASSIUM REFLEX MAGNESIUM: 3.9 MMOL/L (ref 3.5–5)
POTASSIUM REFLEX MAGNESIUM: 4 MMOL/L (ref 3.5–5)
POTASSIUM REFLEX MAGNESIUM: 4 MMOL/L (ref 3.5–5)
POTASSIUM REFLEX MAGNESIUM: 4.2 MMOL/L (ref 3.5–5)
POTASSIUM REFLEX MAGNESIUM: 4.3 MMOL/L (ref 3.5–5)
POTASSIUM REFLEX MAGNESIUM: 4.3 MMOL/L (ref 3.5–5)
POTASSIUM REFLEX MAGNESIUM: 4.4 MMOL/L (ref 3.5–5)
POTASSIUM REFLEX MAGNESIUM: 4.5 MMOL/L (ref 3.5–5)
POTASSIUM REFLEX MAGNESIUM: 4.5 MMOL/L (ref 3.5–5)
POTASSIUM REFLEX MAGNESIUM: 4.6 MMOL/L (ref 3.5–5)
POTASSIUM REFLEX MAGNESIUM: 5.1 MMOL/L (ref 3.5–5)
PROTEIN UA: NEGATIVE MG/DL
PROTHROMBIN TIME: 12.7 SEC (ref 9.3–12.4)
RBC # BLD: 3.74 E12/L (ref 3.8–5.8)
RBC # BLD: 3.84 E12/L (ref 3.8–5.8)
RBC # BLD: 3.9 E12/L (ref 3.8–5.8)
RBC # BLD: 3.98 E12/L (ref 3.8–5.8)
RBC # BLD: 3.99 E12/L (ref 3.8–5.8)
RBC # BLD: 4.01 E12/L (ref 3.8–5.8)
RBC # BLD: 4.01 E12/L (ref 3.8–5.8)
RBC # BLD: 4.06 E12/L (ref 3.8–5.8)
RBC # BLD: 4.07 E12/L (ref 3.8–5.8)
RBC # BLD: 4.15 E12/L (ref 3.8–5.8)
RBC # BLD: 4.16 E12/L (ref 3.8–5.8)
RBC # BLD: 4.19 E12/L (ref 3.8–5.8)
RBC # BLD: 4.36 E12/L (ref 3.8–5.8)
RBC # BLD: 4.39 E12/L (ref 3.8–5.8)
RBC # BLD: 4.45 E12/L (ref 3.8–5.8)
RBC # BLD: 4.53 E12/L (ref 3.8–5.8)
RBC # BLD: 4.68 E12/L (ref 3.8–5.8)
RBC # BLD: NORMAL 10*6/UL
RBC UA: ABNORMAL /HPF (ref 0–2)
SALICYLATE, SERUM: <0.3 MG/DL (ref 0–30)
SARS-COV-2, NAAT: NOT DETECTED
SARS-COV-2, NAAT: NOT DETECTED
SEDIMENTATION RATE, ERYTHROCYTE: 12 MM/HR (ref 0–15)
SMEAR, RESPIRATORY: NORMAL
SODIUM BLD-SCNC: 130 MMOL/L (ref 132–146)
SODIUM BLD-SCNC: 132 MMOL/L (ref 132–146)
SODIUM BLD-SCNC: 133 MMOL/L (ref 132–146)
SODIUM BLD-SCNC: 135 MMOL/L (ref 132–146)
SODIUM BLD-SCNC: 136 MMOL/L (ref 132–146)
SODIUM BLD-SCNC: 137 MMOL/L (ref 132–146)
SODIUM BLD-SCNC: 137 MMOL/L (ref 132–146)
SODIUM BLD-SCNC: 140 MMOL/L (ref 132–146)
SODIUM BLD-SCNC: 144 MMOL/L (ref 132–146)
SPECIFIC GRAVITY UA: 1.01 (ref 1–1.03)
SPHEROCYTES: ABNORMAL
TOTAL PROTEIN: 6.1 G/DL (ref 6.4–8.3)
TOTAL PROTEIN: 6.3 G/DL (ref 6.4–8.3)
TOTAL PROTEIN: 6.4 G/DL (ref 6.4–8.3)
TOTAL PROTEIN: 6.5 G/DL (ref 6.4–8.3)
TOTAL PROTEIN: 6.5 G/DL (ref 6.4–8.3)
TOTAL PROTEIN: 6.6 G/DL (ref 6.4–8.3)
TOTAL PROTEIN: 6.6 G/DL (ref 6.4–8.3)
TOTAL PROTEIN: 6.7 G/DL (ref 6.4–8.3)
TOTAL PROTEIN: 6.7 G/DL (ref 6.4–8.3)
TOTAL PROTEIN: 6.8 G/DL (ref 6.4–8.3)
TOTAL PROTEIN: 6.8 G/DL (ref 6.4–8.3)
TOTAL PROTEIN: 6.9 G/DL (ref 6.4–8.3)
TOTAL PROTEIN: 6.9 G/DL (ref 6.4–8.3)
TOTAL PROTEIN: 7 G/DL (ref 6.4–8.3)
TOTAL PROTEIN: 7.1 G/DL (ref 6.4–8.3)
TOTAL PROTEIN: 7.2 G/DL (ref 6.4–8.3)
TOTAL PROTEIN: 7.3 G/DL (ref 6.4–8.3)
TRICYCLIC ANTIDEPRESSANTS SCREEN SERUM: NEGATIVE NG/ML
UROBILINOGEN, URINE: 1 E.U./DL
VACUOLATED NEUTROPHILS: ABNORMAL
WBC # BLD: 11.1 E9/L (ref 4.5–11.5)
WBC # BLD: 14.4 E9/L (ref 4.5–11.5)
WBC # BLD: 14.5 E9/L (ref 4.5–11.5)
WBC # BLD: 14.8 E9/L (ref 4.5–11.5)
WBC # BLD: 14.9 E9/L (ref 4.5–11.5)
WBC # BLD: 15.3 E9/L (ref 4.5–11.5)
WBC # BLD: 17.2 E9/L (ref 4.5–11.5)
WBC # BLD: 17.3 E9/L (ref 4.5–11.5)
WBC # BLD: 17.3 E9/L (ref 4.5–11.5)
WBC # BLD: 17.7 E9/L (ref 4.5–11.5)
WBC # BLD: 18.8 E9/L (ref 4.5–11.5)
WBC # BLD: 19.7 E9/L (ref 4.5–11.5)
WBC # BLD: 19.8 E9/L (ref 4.5–11.5)
WBC # BLD: 21.1 E9/L (ref 4.5–11.5)
WBC # BLD: 22.4 E9/L (ref 4.5–11.5)
WBC # BLD: 22.9 E9/L (ref 4.5–11.5)
WBC # BLD: 25.1 E9/L (ref 4.5–11.5)
WBC # BLD: 27.4 E9/L (ref 4.5–11.5)
WBC # BLD: 28.2 E9/L (ref 4.5–11.5)
WBC UA: ABNORMAL /HPF (ref 0–5)

## 2020-01-01 PROCEDURE — 6370000000 HC RX 637 (ALT 250 FOR IP): Performed by: INTERNAL MEDICINE

## 2020-01-01 PROCEDURE — 85025 COMPLETE CBC W/AUTO DIFF WBC: CPT

## 2020-01-01 PROCEDURE — 87116 MYCOBACTERIA CULTURE: CPT

## 2020-01-01 PROCEDURE — 2709999900 HC NON-CHARGEABLE SUPPLY: Performed by: INTERNAL MEDICINE

## 2020-01-01 PROCEDURE — 2580000003 HC RX 258: Performed by: INTERNAL MEDICINE

## 2020-01-01 PROCEDURE — 82330 ASSAY OF CALCIUM: CPT

## 2020-01-01 PROCEDURE — 6370000000 HC RX 637 (ALT 250 FOR IP): Performed by: FAMILY MEDICINE

## 2020-01-01 PROCEDURE — 87205 SMEAR GRAM STAIN: CPT

## 2020-01-01 PROCEDURE — 83735 ASSAY OF MAGNESIUM: CPT

## 2020-01-01 PROCEDURE — C9113 INJ PANTOPRAZOLE SODIUM, VIA: HCPCS | Performed by: PHYSICIAN ASSISTANT

## 2020-01-01 PROCEDURE — 36415 COLL VENOUS BLD VENIPUNCTURE: CPT

## 2020-01-01 PROCEDURE — 93005 ELECTROCARDIOGRAM TRACING: CPT | Performed by: PHYSICIAN ASSISTANT

## 2020-01-01 PROCEDURE — 97530 THERAPEUTIC ACTIVITIES: CPT

## 2020-01-01 PROCEDURE — 2709999900 CT NEEDLE BIOPSY LIVER PERCUTANEOUS

## 2020-01-01 PROCEDURE — 1200000000 HC SEMI PRIVATE

## 2020-01-01 PROCEDURE — 6360000002 HC RX W HCPCS: Performed by: INTERNAL MEDICINE

## 2020-01-01 PROCEDURE — 6370000000 HC RX 637 (ALT 250 FOR IP): Performed by: PHYSICIAN ASSISTANT

## 2020-01-01 PROCEDURE — 99231 SBSQ HOSP IP/OBS SF/LOW 25: CPT | Performed by: FAMILY MEDICINE

## 2020-01-01 PROCEDURE — 6360000002 HC RX W HCPCS: Performed by: PHYSICIAN ASSISTANT

## 2020-01-01 PROCEDURE — 80053 COMPREHEN METABOLIC PANEL: CPT

## 2020-01-01 PROCEDURE — 77334 RADIATION TREATMENT AID(S): CPT | Performed by: RADIOLOGY

## 2020-01-01 PROCEDURE — G0378 HOSPITAL OBSERVATION PER HR: HCPCS

## 2020-01-01 PROCEDURE — 99214 OFFICE O/P EST MOD 30 MIN: CPT | Performed by: INTERNAL MEDICINE

## 2020-01-01 PROCEDURE — 93010 ELECTROCARDIOGRAM REPORT: CPT | Performed by: INTERNAL MEDICINE

## 2020-01-01 PROCEDURE — 99245 OFF/OP CONSLTJ NEW/EST HI 55: CPT | Performed by: PHYSICIAN ASSISTANT

## 2020-01-01 PROCEDURE — 77295 3-D RADIOTHERAPY PLAN: CPT | Performed by: RADIOLOGY

## 2020-01-01 PROCEDURE — 6360000002 HC RX W HCPCS: Performed by: RADIOLOGY

## 2020-01-01 PROCEDURE — 2580000003 HC RX 258: Performed by: PHYSICIAN ASSISTANT

## 2020-01-01 PROCEDURE — 6360000002 HC RX W HCPCS

## 2020-01-01 PROCEDURE — 73552 X-RAY EXAM OF FEMUR 2/>: CPT

## 2020-01-01 PROCEDURE — 6360000002 HC RX W HCPCS: Performed by: FAMILY MEDICINE

## 2020-01-01 PROCEDURE — 2580000003 HC RX 258

## 2020-01-01 PROCEDURE — 99233 SBSQ HOSP IP/OBS HIGH 50: CPT | Performed by: FAMILY MEDICINE

## 2020-01-01 PROCEDURE — 3609011100 HC BRONCHOSCOPY BRUSHINGS: Performed by: INTERNAL MEDICINE

## 2020-01-01 PROCEDURE — 77290 THER RAD SIMULAJ FIELD CPLX: CPT | Performed by: RADIOLOGY

## 2020-01-01 PROCEDURE — 99284 EMERGENCY DEPT VISIT MOD MDM: CPT

## 2020-01-01 PROCEDURE — 2720000010 HC SURG SUPPLY STERILE: Performed by: INTERNAL MEDICINE

## 2020-01-01 PROCEDURE — 96375 TX/PRO/DX INJ NEW DRUG ADDON: CPT

## 2020-01-01 PROCEDURE — 77012 CT SCAN FOR NEEDLE BIOPSY: CPT

## 2020-01-01 PROCEDURE — 6370000000 HC RX 637 (ALT 250 FOR IP): Performed by: STUDENT IN AN ORGANIZED HEALTH CARE EDUCATION/TRAINING PROGRAM

## 2020-01-01 PROCEDURE — 94770 HC ETCO2 MONITOR DAILY: CPT

## 2020-01-01 PROCEDURE — 72158 MRI LUMBAR SPINE W/O & W/DYE: CPT

## 2020-01-01 PROCEDURE — 77014 PR CT GUIDANCE PLACEMENT RAD THERAPY FIELDS: CPT | Performed by: RADIOLOGY

## 2020-01-01 PROCEDURE — L0464 TLSO 4MOD SACRO-SCAP PRE: HCPCS

## 2020-01-01 PROCEDURE — 87206 SMEAR FLUORESCENT/ACID STAI: CPT

## 2020-01-01 PROCEDURE — 88112 CYTOPATH CELL ENHANCE TECH: CPT

## 2020-01-01 PROCEDURE — 0BJ08ZZ INSPECTION OF TRACHEOBRONCHIAL TREE, VIA NATURAL OR ARTIFICIAL OPENING ENDOSCOPIC: ICD-10-PCS | Performed by: INTERNAL MEDICINE

## 2020-01-01 PROCEDURE — 3609011900 HC BRONCHOSCOPY NEEDLE BX TRACHEA MAIN STEM&/BRON: Performed by: INTERNAL MEDICINE

## 2020-01-01 PROCEDURE — 99233 SBSQ HOSP IP/OBS HIGH 50: CPT | Performed by: INTERNAL MEDICINE

## 2020-01-01 PROCEDURE — A9503 TC99M MEDRONATE: HCPCS | Performed by: RADIOLOGY

## 2020-01-01 PROCEDURE — 96376 TX/PRO/DX INJ SAME DRUG ADON: CPT

## 2020-01-01 PROCEDURE — 77387 GUIDANCE FOR RADJ TX DLVR: CPT | Performed by: RADIOLOGY

## 2020-01-01 PROCEDURE — 6360000002 HC RX W HCPCS: Performed by: EMERGENCY MEDICINE

## 2020-01-01 PROCEDURE — 2500000003 HC RX 250 WO HCPCS: Performed by: INTERNAL MEDICINE

## 2020-01-01 PROCEDURE — 88342 IMHCHEM/IMCYTCHM 1ST ANTB: CPT

## 2020-01-01 PROCEDURE — 93005 ELECTROCARDIOGRAM TRACING: CPT | Performed by: EMERGENCY MEDICINE

## 2020-01-01 PROCEDURE — 96374 THER/PROPH/DIAG INJ IV PUSH: CPT

## 2020-01-01 PROCEDURE — 99232 SBSQ HOSP IP/OBS MODERATE 35: CPT | Performed by: FAMILY MEDICINE

## 2020-01-01 PROCEDURE — 77263 THER RADIOLOGY TX PLNG CPLX: CPT | Performed by: RADIOLOGY

## 2020-01-01 PROCEDURE — 88341 IMHCHEM/IMCYTCHM EA ADD ANTB: CPT

## 2020-01-01 PROCEDURE — 77412 RADIATION TX DELIVERY LVL 3: CPT | Performed by: RADIOLOGY

## 2020-01-01 PROCEDURE — 6360000004 HC RX CONTRAST MEDICATION: Performed by: RADIOLOGY

## 2020-01-01 PROCEDURE — 0FB03ZX EXCISION OF LIVER, PERCUTANEOUS APPROACH, DIAGNOSTIC: ICD-10-PCS | Performed by: RADIOLOGY

## 2020-01-01 PROCEDURE — U0002 COVID-19 LAB TEST NON-CDC: HCPCS

## 2020-01-01 PROCEDURE — 88305 TISSUE EXAM BY PATHOLOGIST: CPT

## 2020-01-01 PROCEDURE — 70030 X-RAY EYE FOR FOREIGN BODY: CPT

## 2020-01-01 PROCEDURE — 86301 IMMUNOASSAY TUMOR CA 19-9: CPT

## 2020-01-01 PROCEDURE — 77427 RADIATION TX MANAGEMENT X5: CPT | Performed by: RADIOLOGY

## 2020-01-01 PROCEDURE — 96365 THER/PROPH/DIAG IV INF INIT: CPT

## 2020-01-01 PROCEDURE — 6360000002 HC RX W HCPCS: Performed by: NEUROLOGICAL SURGERY

## 2020-01-01 PROCEDURE — 99225 PR SBSQ OBSERVATION CARE/DAY 25 MINUTES: CPT | Performed by: TRANSPLANT SURGERY

## 2020-01-01 PROCEDURE — 0BB78ZX EXCISION OF LEFT MAIN BRONCHUS, VIA NATURAL OR ARTIFICIAL OPENING ENDOSCOPIC, DIAGNOSTIC: ICD-10-PCS | Performed by: INTERNAL MEDICINE

## 2020-01-01 PROCEDURE — 85610 PROTHROMBIN TIME: CPT

## 2020-01-01 PROCEDURE — 2580000003 HC RX 258: Performed by: EMERGENCY MEDICINE

## 2020-01-01 PROCEDURE — 96372 THER/PROPH/DIAG INJ SC/IM: CPT

## 2020-01-01 PROCEDURE — 99233 SBSQ HOSP IP/OBS HIGH 50: CPT | Performed by: PHYSICIAN ASSISTANT

## 2020-01-01 PROCEDURE — 6360000002 HC RX W HCPCS: Performed by: ANESTHESIOLOGY

## 2020-01-01 PROCEDURE — 99999 PR OFFICE/OUTPT VISIT,PROCEDURE ONLY: CPT | Performed by: RADIOLOGY

## 2020-01-01 PROCEDURE — 7100000001 HC PACU RECOVERY - ADDTL 15 MIN: Performed by: INTERNAL MEDICINE

## 2020-01-01 PROCEDURE — 72157 MRI CHEST SPINE W/O & W/DYE: CPT

## 2020-01-01 PROCEDURE — 78306 BONE IMAGING WHOLE BODY: CPT | Performed by: RADIOLOGY

## 2020-01-01 PROCEDURE — 87070 CULTURE OTHR SPECIMN AEROBIC: CPT

## 2020-01-01 PROCEDURE — 87040 BLOOD CULTURE FOR BACTERIA: CPT

## 2020-01-01 PROCEDURE — 6370000000 HC RX 637 (ALT 250 FOR IP): Performed by: PSYCHIATRY & NEUROLOGY

## 2020-01-01 PROCEDURE — 77336 RADIATION PHYSICS CONSULT: CPT | Performed by: RADIOLOGY

## 2020-01-01 PROCEDURE — 3700000001 HC ADD 15 MINUTES (ANESTHESIA): Performed by: INTERNAL MEDICINE

## 2020-01-01 PROCEDURE — 97166 OT EVAL MOD COMPLEX 45 MIN: CPT

## 2020-01-01 PROCEDURE — 77300 RADIATION THERAPY DOSE PLAN: CPT | Performed by: RADIOLOGY

## 2020-01-01 PROCEDURE — 82378 CARCINOEMBRYONIC ANTIGEN: CPT

## 2020-01-01 PROCEDURE — 3430000000 HC RX DIAGNOSTIC RADIOPHARMACEUTICAL: Performed by: RADIOLOGY

## 2020-01-01 PROCEDURE — 99244 OFF/OP CNSLTJ NEW/EST MOD 40: CPT | Performed by: TRANSPLANT SURGERY

## 2020-01-01 PROCEDURE — 77012 CT SCAN FOR NEEDLE BIOPSY: CPT | Performed by: RADIOLOGY

## 2020-01-01 PROCEDURE — 86316 IMMUNOASSAY TUMOR OTHER: CPT

## 2020-01-01 PROCEDURE — 70553 MRI BRAIN STEM W/O & W/DYE: CPT

## 2020-01-01 PROCEDURE — G0480 DRUG TEST DEF 1-7 CLASSES: HCPCS

## 2020-01-01 PROCEDURE — 87102 FUNGUS ISOLATION CULTURE: CPT

## 2020-01-01 PROCEDURE — 0BC48ZZ EXTIRPATION OF MATTER FROM RIGHT UPPER LOBE BRONCHUS, VIA NATURAL OR ARTIFICIAL OPENING ENDOSCOPIC: ICD-10-PCS | Performed by: INTERNAL MEDICINE

## 2020-01-01 PROCEDURE — G0378 HOSPITAL OBSERVATION PER HR: HCPCS | Performed by: INTERNAL MEDICINE

## 2020-01-01 PROCEDURE — 7100000000 HC PACU RECOVERY - FIRST 15 MIN: Performed by: INTERNAL MEDICINE

## 2020-01-01 PROCEDURE — 80307 DRUG TEST PRSMV CHEM ANLYZR: CPT

## 2020-01-01 PROCEDURE — 2500000003 HC RX 250 WO HCPCS: Performed by: RADIOLOGY

## 2020-01-01 PROCEDURE — 99252 IP/OBS CONSLTJ NEW/EST SF 35: CPT | Performed by: PSYCHIATRY & NEUROLOGY

## 2020-01-01 PROCEDURE — A9579 GAD-BASE MR CONTRAST NOS,1ML: HCPCS | Performed by: RADIOLOGY

## 2020-01-01 PROCEDURE — 3609011800 HC BRONCHOSCOPY/TRANSBRONCHIAL LUNG BIOPSY: Performed by: INTERNAL MEDICINE

## 2020-01-01 PROCEDURE — 80048 BASIC METABOLIC PNL TOTAL CA: CPT

## 2020-01-01 PROCEDURE — 87015 SPECIMEN INFECT AGNT CONCNTJ: CPT

## 2020-01-01 PROCEDURE — 0BB38ZX EXCISION OF RIGHT MAIN BRONCHUS, VIA NATURAL OR ARTIFICIAL OPENING ENDOSCOPIC, DIAGNOSTIC: ICD-10-PCS | Performed by: INTERNAL MEDICINE

## 2020-01-01 PROCEDURE — 0BC68ZZ EXTIRPATION OF MATTER FROM RIGHT LOWER LOBE BRONCHUS, VIA NATURAL OR ARTIFICIAL OPENING ENDOSCOPIC: ICD-10-PCS | Performed by: INTERNAL MEDICINE

## 2020-01-01 PROCEDURE — 83605 ASSAY OF LACTIC ACID: CPT

## 2020-01-01 PROCEDURE — 3700000000 HC ANESTHESIA ATTENDED CARE: Performed by: INTERNAL MEDICINE

## 2020-01-01 PROCEDURE — 2580000003 HC RX 258: Performed by: NEUROLOGICAL SURGERY

## 2020-01-01 PROCEDURE — 74177 CT ABD & PELVIS W/CONTRAST: CPT

## 2020-01-01 PROCEDURE — 88173 CYTOPATH EVAL FNA REPORT: CPT

## 2020-01-01 PROCEDURE — 85651 RBC SED RATE NONAUTOMATED: CPT

## 2020-01-01 PROCEDURE — A9577 INJ MULTIHANCE: HCPCS | Performed by: RADIOLOGY

## 2020-01-01 PROCEDURE — 93005 ELECTROCARDIOGRAM TRACING: CPT | Performed by: FAMILY MEDICINE

## 2020-01-01 PROCEDURE — 78306 BONE IMAGING WHOLE BODY: CPT

## 2020-01-01 PROCEDURE — 97161 PT EVAL LOW COMPLEX 20 MIN: CPT

## 2020-01-01 PROCEDURE — 71260 CT THORAX DX C+: CPT

## 2020-01-01 PROCEDURE — 87106 FUNGI IDENTIFICATION YEAST: CPT

## 2020-01-01 PROCEDURE — 99255 IP/OBS CONSLTJ NEW/EST HI 80: CPT | Performed by: INTERNAL MEDICINE

## 2020-01-01 PROCEDURE — 6370000000 HC RX 637 (ALT 250 FOR IP): Performed by: EMERGENCY MEDICINE

## 2020-01-01 PROCEDURE — 86140 C-REACTIVE PROTEIN: CPT

## 2020-01-01 PROCEDURE — 99254 IP/OBS CNSLTJ NEW/EST MOD 60: CPT | Performed by: RADIOLOGY

## 2020-01-01 PROCEDURE — 72131 CT LUMBAR SPINE W/O DYE: CPT

## 2020-01-01 PROCEDURE — 99356 PR PROLONGED SVC I/P OR OBS SETTING 1ST HOUR: CPT | Performed by: PHYSICIAN ASSISTANT

## 2020-01-01 PROCEDURE — 99215 OFFICE O/P EST HI 40 MIN: CPT | Performed by: PHYSICIAN ASSISTANT

## 2020-01-01 PROCEDURE — 99231 SBSQ HOSP IP/OBS SF/LOW 25: CPT | Performed by: NURSE PRACTITIONER

## 2020-01-01 PROCEDURE — 81001 URINALYSIS AUTO W/SCOPE: CPT

## 2020-01-01 PROCEDURE — 2500000003 HC RX 250 WO HCPCS

## 2020-01-01 PROCEDURE — 88307 TISSUE EXAM BY PATHOLOGIST: CPT

## 2020-01-01 PROCEDURE — 3609027000 HC BRONCHOSCOPY: Performed by: INTERNAL MEDICINE

## 2020-01-01 PROCEDURE — 47000 NEEDLE BIOPSY OF LIVER PERQ: CPT | Performed by: RADIOLOGY

## 2020-01-01 RX ORDER — AMLODIPINE BESYLATE 5 MG/1
5 TABLET ORAL DAILY
Status: DISCONTINUED | OUTPATIENT
Start: 2020-01-01 | End: 2020-01-01

## 2020-01-01 RX ORDER — DIVALPROEX SODIUM 125 MG/1
125 CAPSULE, COATED PELLETS ORAL EVERY 12 HOURS SCHEDULED
Status: DISCONTINUED | OUTPATIENT
Start: 2020-01-01 | End: 2021-01-01

## 2020-01-01 RX ORDER — MORPHINE SULFATE 4 MG/ML
4 INJECTION, SOLUTION INTRAMUSCULAR; INTRAVENOUS DAILY
Status: DISCONTINUED | OUTPATIENT
Start: 2020-01-01 | End: 2021-01-01

## 2020-01-01 RX ORDER — PANTOPRAZOLE SODIUM 40 MG/10ML
40 INJECTION, POWDER, LYOPHILIZED, FOR SOLUTION INTRAVENOUS DAILY
Status: DISCONTINUED | OUTPATIENT
Start: 2020-01-01 | End: 2020-01-01

## 2020-01-01 RX ORDER — SUCCINYLCHOLINE/SOD CL,ISO/PF 200MG/10ML
SYRINGE (ML) INTRAVENOUS PRN
Status: DISCONTINUED | OUTPATIENT
Start: 2020-01-01 | End: 2020-01-01 | Stop reason: SDUPTHER

## 2020-01-01 RX ORDER — FENTANYL CITRATE 50 UG/ML
INJECTION, SOLUTION INTRAMUSCULAR; INTRAVENOUS
Status: COMPLETED | OUTPATIENT
Start: 2020-01-01 | End: 2020-01-01

## 2020-01-01 RX ORDER — DEXAMETHASONE SODIUM PHOSPHATE 10 MG/ML
10 INJECTION, SOLUTION INTRAMUSCULAR; INTRAVENOUS ONCE
Status: COMPLETED | OUTPATIENT
Start: 2020-01-01 | End: 2020-01-01

## 2020-01-01 RX ORDER — MIDAZOLAM HYDROCHLORIDE 1 MG/ML
INJECTION INTRAMUSCULAR; INTRAVENOUS PRN
Status: DISCONTINUED | OUTPATIENT
Start: 2020-01-01 | End: 2020-01-01 | Stop reason: SDUPTHER

## 2020-01-01 RX ORDER — LISINOPRIL 20 MG/1
20 TABLET ORAL DAILY
Status: ON HOLD | COMMUNITY
End: 2021-01-01 | Stop reason: HOSPADM

## 2020-01-01 RX ORDER — MINERAL OIL 100 G/100G
1 OIL RECTAL ONCE
Status: DISCONTINUED | OUTPATIENT
Start: 2020-01-01 | End: 2021-01-01

## 2020-01-01 RX ORDER — DEXAMETHASONE 4 MG/1
4 TABLET ORAL EVERY 8 HOURS SCHEDULED
Status: DISCONTINUED | OUTPATIENT
Start: 2020-01-01 | End: 2021-01-01

## 2020-01-01 RX ORDER — BISACODYL 10 MG
10 SUPPOSITORY, RECTAL RECTAL PRN
Status: DISCONTINUED | OUTPATIENT
Start: 2020-01-01 | End: 2021-01-01 | Stop reason: HOSPADM

## 2020-01-01 RX ORDER — FOLIC ACID 1 MG/1
1 TABLET ORAL DAILY
Status: DISCONTINUED | OUTPATIENT
Start: 2020-01-01 | End: 2021-01-01

## 2020-01-01 RX ORDER — LIDOCAINE HYDROCHLORIDE 20 MG/ML
INJECTION, SOLUTION INTRAVENOUS PRN
Status: DISCONTINUED | OUTPATIENT
Start: 2020-01-01 | End: 2020-01-01 | Stop reason: SDUPTHER

## 2020-01-01 RX ORDER — ACETAMINOPHEN 500 MG
1000 TABLET ORAL 3 TIMES DAILY
Qty: 42 TABLET | Refills: 0 | Status: ON HOLD | OUTPATIENT
Start: 2020-01-01 | End: 2021-01-01 | Stop reason: HOSPADM

## 2020-01-01 RX ORDER — NEOSTIGMINE METHYLSULFATE 1 MG/ML
INJECTION, SOLUTION INTRAVENOUS PRN
Status: DISCONTINUED | OUTPATIENT
Start: 2020-01-01 | End: 2020-01-01 | Stop reason: SDUPTHER

## 2020-01-01 RX ORDER — MORPHINE SULFATE 2 MG/ML
2 INJECTION, SOLUTION INTRAMUSCULAR; INTRAVENOUS EVERY 5 MIN PRN
Status: DISCONTINUED | OUTPATIENT
Start: 2020-01-01 | End: 2020-01-01

## 2020-01-01 RX ORDER — SODIUM CHLORIDE 0.9 % (FLUSH) 0.9 %
10 SYRINGE (ML) INJECTION PRN
Status: DISCONTINUED | OUTPATIENT
Start: 2020-01-01 | End: 2021-01-01

## 2020-01-01 RX ORDER — CYCLOBENZAPRINE HCL 10 MG
10 TABLET ORAL 3 TIMES DAILY PRN
Qty: 21 TABLET | Refills: 0 | Status: ON HOLD | OUTPATIENT
Start: 2020-01-01 | End: 2020-01-01

## 2020-01-01 RX ORDER — ROCURONIUM BROMIDE 10 MG/ML
INJECTION, SOLUTION INTRAVENOUS PRN
Status: DISCONTINUED | OUTPATIENT
Start: 2020-01-01 | End: 2020-01-01 | Stop reason: SDUPTHER

## 2020-01-01 RX ORDER — SENNA AND DOCUSATE SODIUM 50; 8.6 MG/1; MG/1
2 TABLET, FILM COATED ORAL 2 TIMES DAILY
Status: DISCONTINUED | OUTPATIENT
Start: 2020-01-01 | End: 2021-01-01

## 2020-01-01 RX ORDER — LANOLIN ALCOHOL/MO/W.PET/CERES
3 CREAM (GRAM) TOPICAL EVERY EVENING
Status: DISCONTINUED | OUTPATIENT
Start: 2020-01-01 | End: 2021-01-01

## 2020-01-01 RX ORDER — ORPHENADRINE CITRATE 100 MG/1
100 TABLET, EXTENDED RELEASE ORAL ONCE
Status: COMPLETED | OUTPATIENT
Start: 2020-01-01 | End: 2020-01-01

## 2020-01-01 RX ORDER — DEXAMETHASONE SODIUM PHOSPHATE 4 MG/ML
10 INJECTION, SOLUTION INTRA-ARTICULAR; INTRALESIONAL; INTRAMUSCULAR; INTRAVENOUS; SOFT TISSUE
Status: DISPENSED | OUTPATIENT
Start: 2020-01-01 | End: 2020-01-01

## 2020-01-01 RX ORDER — CARVEDILOL 25 MG/1
25 TABLET ORAL 2 TIMES DAILY WITH MEALS
Status: DISCONTINUED | OUTPATIENT
Start: 2020-01-01 | End: 2021-01-01

## 2020-01-01 RX ORDER — MORPHINE SULFATE 4 MG/ML
4 INJECTION, SOLUTION INTRAMUSCULAR; INTRAVENOUS ONCE
Status: COMPLETED | OUTPATIENT
Start: 2020-01-01 | End: 2020-01-01

## 2020-01-01 RX ORDER — PANTOPRAZOLE SODIUM 40 MG/1
40 TABLET, DELAYED RELEASE ORAL
Status: DISCONTINUED | OUTPATIENT
Start: 2020-01-01 | End: 2021-01-01

## 2020-01-01 RX ORDER — MIDAZOLAM HYDROCHLORIDE 1 MG/ML
INJECTION INTRAMUSCULAR; INTRAVENOUS
Status: COMPLETED | OUTPATIENT
Start: 2020-01-01 | End: 2020-01-01

## 2020-01-01 RX ORDER — OXYCODONE HYDROCHLORIDE 5 MG/1
5 TABLET ORAL EVERY 4 HOURS PRN
Status: DISCONTINUED | OUTPATIENT
Start: 2020-01-01 | End: 2020-01-01

## 2020-01-01 RX ORDER — SODIUM CHLORIDE 0.9 % (FLUSH) 0.9 %
SYRINGE (ML) INJECTION
Status: COMPLETED
Start: 2020-01-01 | End: 2020-01-01

## 2020-01-01 RX ORDER — LORAZEPAM 2 MG/ML
INJECTION INTRAMUSCULAR
Status: DISPENSED
Start: 2020-01-01 | End: 2020-01-01

## 2020-01-01 RX ORDER — BUSPIRONE HYDROCHLORIDE 10 MG/1
10 TABLET ORAL NIGHTLY
Status: ON HOLD | COMMUNITY
End: 2021-01-01 | Stop reason: HOSPADM

## 2020-01-01 RX ORDER — LIDOCAINE HYDROCHLORIDE 10 MG/ML
INJECTION, SOLUTION EPIDURAL; INFILTRATION; INTRACAUDAL; PERINEURAL PRN
Status: DISCONTINUED | OUTPATIENT
Start: 2020-01-01 | End: 2020-01-01 | Stop reason: ALTCHOICE

## 2020-01-01 RX ORDER — ACETAMINOPHEN 325 MG/1
650 TABLET ORAL EVERY 4 HOURS PRN
Status: DISCONTINUED | OUTPATIENT
Start: 2020-01-01 | End: 2021-01-01

## 2020-01-01 RX ORDER — SODIUM CHLORIDE 0.9 % (FLUSH) 0.9 %
10 SYRINGE (ML) INJECTION EVERY 12 HOURS SCHEDULED
Status: CANCELLED | OUTPATIENT
Start: 2020-01-01

## 2020-01-01 RX ORDER — SODIUM CHLORIDE 0.9 % (FLUSH) 0.9 %
10 SYRINGE (ML) INJECTION PRN
Status: CANCELLED | OUTPATIENT
Start: 2020-01-01

## 2020-01-01 RX ORDER — LORAZEPAM 1 MG/1
1 TABLET ORAL EVERY 4 HOURS PRN
Status: DISCONTINUED | OUTPATIENT
Start: 2020-01-01 | End: 2021-01-01

## 2020-01-01 RX ORDER — KETOROLAC TROMETHAMINE 30 MG/ML
15 INJECTION, SOLUTION INTRAMUSCULAR; INTRAVENOUS ONCE
Status: COMPLETED | OUTPATIENT
Start: 2020-01-01 | End: 2020-01-01

## 2020-01-01 RX ORDER — MORPHINE SULFATE 2 MG/ML
2 INJECTION, SOLUTION INTRAMUSCULAR; INTRAVENOUS EVERY 4 HOURS PRN
Status: DISCONTINUED | OUTPATIENT
Start: 2020-01-01 | End: 2020-01-01

## 2020-01-01 RX ORDER — GABAPENTIN 100 MG/1
200 CAPSULE ORAL 3 TIMES DAILY
Status: DISCONTINUED | OUTPATIENT
Start: 2020-01-01 | End: 2020-01-01

## 2020-01-01 RX ORDER — OXYCODONE HYDROCHLORIDE 15 MG/1
15 TABLET ORAL EVERY 4 HOURS PRN
Status: DISCONTINUED | OUTPATIENT
Start: 2020-01-01 | End: 2021-01-01

## 2020-01-01 RX ORDER — 0.9 % SODIUM CHLORIDE 0.9 %
1000 INTRAVENOUS SOLUTION INTRAVENOUS ONCE
Status: COMPLETED | OUTPATIENT
Start: 2020-01-01 | End: 2020-01-01

## 2020-01-01 RX ORDER — BISACODYL 10 MG
10 SUPPOSITORY, RECTAL RECTAL ONCE
Status: COMPLETED | OUTPATIENT
Start: 2020-01-01 | End: 2020-01-01

## 2020-01-01 RX ORDER — OXYCODONE HYDROCHLORIDE 15 MG/1
15 TABLET ORAL EVERY 4 HOURS PRN
Status: DISCONTINUED | OUTPATIENT
Start: 2020-01-01 | End: 2020-01-01

## 2020-01-01 RX ORDER — MORPHINE SULFATE 2 MG/ML
2 INJECTION, SOLUTION INTRAMUSCULAR; INTRAVENOUS ONCE
Status: COMPLETED | OUTPATIENT
Start: 2020-01-01 | End: 2020-01-01

## 2020-01-01 RX ORDER — AMLODIPINE BESYLATE 10 MG/1
10 TABLET ORAL DAILY
Status: DISCONTINUED | OUTPATIENT
Start: 2020-01-01 | End: 2020-01-01

## 2020-01-01 RX ORDER — OXYCODONE HYDROCHLORIDE 10 MG/1
10 TABLET ORAL EVERY 4 HOURS PRN
Status: DISCONTINUED | OUTPATIENT
Start: 2020-01-01 | End: 2020-01-01

## 2020-01-01 RX ORDER — POTASSIUM CHLORIDE 20 MEQ/1
40 TABLET, EXTENDED RELEASE ORAL ONCE
Status: COMPLETED | OUTPATIENT
Start: 2020-01-01 | End: 2020-01-01

## 2020-01-01 RX ORDER — EPINEPHRINE 0.1 MG/ML
SYRINGE (ML) INJECTION PRN
Status: DISCONTINUED | OUTPATIENT
Start: 2020-01-01 | End: 2020-01-01 | Stop reason: ALTCHOICE

## 2020-01-01 RX ORDER — PREDNISONE 10 MG/1
40 TABLET ORAL DAILY
Qty: 20 TABLET | Refills: 0 | Status: SHIPPED | OUTPATIENT
Start: 2020-01-01 | End: 2020-01-01

## 2020-01-01 RX ORDER — SODIUM CHLORIDE 9 MG/ML
INJECTION, SOLUTION INTRAVENOUS CONTINUOUS PRN
Status: DISCONTINUED | OUTPATIENT
Start: 2020-01-01 | End: 2020-01-01 | Stop reason: SDUPTHER

## 2020-01-01 RX ORDER — GABAPENTIN 400 MG/1
400 CAPSULE ORAL 3 TIMES DAILY
Status: DISCONTINUED | OUTPATIENT
Start: 2020-01-01 | End: 2021-01-01

## 2020-01-01 RX ORDER — OXYCODONE HYDROCHLORIDE AND ACETAMINOPHEN 5; 325 MG/1; MG/1
2 TABLET ORAL ONCE
Status: COMPLETED | OUTPATIENT
Start: 2020-01-01 | End: 2020-01-01

## 2020-01-01 RX ORDER — PROPOFOL 10 MG/ML
INJECTION, EMULSION INTRAVENOUS PRN
Status: DISCONTINUED | OUTPATIENT
Start: 2020-01-01 | End: 2020-01-01 | Stop reason: SDUPTHER

## 2020-01-01 RX ORDER — GLYCOPYRROLATE 1 MG/5 ML
SYRINGE (ML) INTRAVENOUS PRN
Status: DISCONTINUED | OUTPATIENT
Start: 2020-01-01 | End: 2020-01-01 | Stop reason: SDUPTHER

## 2020-01-01 RX ORDER — HYDRALAZINE HYDROCHLORIDE 50 MG/1
50 TABLET, FILM COATED ORAL EVERY 8 HOURS SCHEDULED
Status: DISCONTINUED | OUTPATIENT
Start: 2020-01-01 | End: 2020-01-01

## 2020-01-01 RX ORDER — SENNA AND DOCUSATE SODIUM 50; 8.6 MG/1; MG/1
2 TABLET, FILM COATED ORAL 2 TIMES DAILY
Status: DISCONTINUED | OUTPATIENT
Start: 2020-01-01 | End: 2020-01-01

## 2020-01-01 RX ORDER — METHADONE HYDROCHLORIDE 10 MG/1
10 TABLET ORAL EVERY 8 HOURS SCHEDULED
Status: DISCONTINUED | OUTPATIENT
Start: 2020-01-01 | End: 2021-01-01

## 2020-01-01 RX ORDER — FUROSEMIDE 20 MG/1
20 TABLET ORAL DAILY
Status: DISCONTINUED | OUTPATIENT
Start: 2020-01-01 | End: 2021-01-01

## 2020-01-01 RX ORDER — MORPHINE SULFATE/0.9% NACL/PF 1 MG/ML
SYRINGE (ML) INJECTION CONTINUOUS
Status: DISCONTINUED | OUTPATIENT
Start: 2020-01-01 | End: 2020-01-01

## 2020-01-01 RX ORDER — FENTANYL CITRATE 50 UG/ML
25 INJECTION, SOLUTION INTRAMUSCULAR; INTRAVENOUS EVERY 5 MIN PRN
Status: DISCONTINUED | OUTPATIENT
Start: 2020-01-01 | End: 2020-01-01

## 2020-01-01 RX ORDER — MIRTAZAPINE 15 MG/1
15 TABLET, FILM COATED ORAL NIGHTLY
Status: DISCONTINUED | OUTPATIENT
Start: 2020-01-01 | End: 2021-01-01

## 2020-01-01 RX ORDER — ONDANSETRON 2 MG/ML
4 INJECTION INTRAMUSCULAR; INTRAVENOUS
Status: ACTIVE | OUTPATIENT
Start: 2020-01-01 | End: 2020-01-01

## 2020-01-01 RX ORDER — NAPROXEN 500 MG/1
500 TABLET ORAL 2 TIMES DAILY
Qty: 14 TABLET | Refills: 0 | Status: ON HOLD | OUTPATIENT
Start: 2020-01-01 | End: 2021-01-01 | Stop reason: HOSPADM

## 2020-01-01 RX ORDER — METHADONE HYDROCHLORIDE 10 MG/1
5 TABLET ORAL EVERY 8 HOURS SCHEDULED
Status: DISCONTINUED | OUTPATIENT
Start: 2020-01-01 | End: 2020-01-01

## 2020-01-01 RX ORDER — SODIUM CHLORIDE 9 MG/ML
10 INJECTION INTRAVENOUS DAILY
Status: DISCONTINUED | OUTPATIENT
Start: 2020-01-01 | End: 2020-01-01 | Stop reason: ALTCHOICE

## 2020-01-01 RX ORDER — ALBUTEROL SULFATE 2.5 MG/3ML
2.5 SOLUTION RESPIRATORY (INHALATION) EVERY 4 HOURS
Status: CANCELLED | OUTPATIENT
Start: 2020-01-01

## 2020-01-01 RX ORDER — TC 99M MEDRONATE 20 MG/10ML
28 INJECTION, POWDER, LYOPHILIZED, FOR SOLUTION INTRAVENOUS ONCE
Status: COMPLETED | OUTPATIENT
Start: 2020-01-01 | End: 2020-01-01

## 2020-01-01 RX ORDER — LORAZEPAM 2 MG/ML
1 INJECTION INTRAMUSCULAR EVERY 6 HOURS PRN
Status: DISCONTINUED | OUTPATIENT
Start: 2020-01-01 | End: 2021-01-01

## 2020-01-01 RX ORDER — PROMETHAZINE HYDROCHLORIDE 25 MG/ML
6.25 INJECTION, SOLUTION INTRAMUSCULAR; INTRAVENOUS
Status: ACTIVE | OUTPATIENT
Start: 2020-01-01 | End: 2020-01-01

## 2020-01-01 RX ORDER — ALBUTEROL SULFATE 90 UG/1
2 AEROSOL, METERED RESPIRATORY (INHALATION) EVERY 6 HOURS PRN
Qty: 1 INHALER | Refills: 3 | Status: SHIPPED
Start: 2020-01-01 | End: 2021-01-01 | Stop reason: HOSPADM

## 2020-01-01 RX ORDER — DOCUSATE SODIUM 100 MG/1
100 CAPSULE, LIQUID FILLED ORAL DAILY
Status: DISCONTINUED | OUTPATIENT
Start: 2020-01-01 | End: 2020-01-01

## 2020-01-01 RX ORDER — POLYETHYLENE GLYCOL 3350 17 G/17G
17 POWDER, FOR SOLUTION ORAL DAILY
Status: DISCONTINUED | OUTPATIENT
Start: 2020-01-01 | End: 2020-01-01

## 2020-01-01 RX ORDER — CYCLOBENZAPRINE HCL 10 MG
10 TABLET ORAL 3 TIMES DAILY PRN
Status: DISCONTINUED | OUTPATIENT
Start: 2020-01-01 | End: 2020-01-01

## 2020-01-01 RX ORDER — MORPHINE SULFATE 2 MG/ML
1 INJECTION, SOLUTION INTRAMUSCULAR; INTRAVENOUS EVERY 5 MIN PRN
Status: DISCONTINUED | OUTPATIENT
Start: 2020-01-01 | End: 2020-01-01

## 2020-01-01 RX ORDER — LIDOCAINE 50 MG/G
1 PATCH TOPICAL EVERY 24 HOURS
Qty: 10 PATCH | Refills: 0 | Status: ON HOLD | OUTPATIENT
Start: 2020-01-01 | End: 2020-01-01 | Stop reason: ALTCHOICE

## 2020-01-01 RX ORDER — ONDANSETRON 2 MG/ML
INJECTION INTRAMUSCULAR; INTRAVENOUS PRN
Status: DISCONTINUED | OUTPATIENT
Start: 2020-01-01 | End: 2020-01-01 | Stop reason: SDUPTHER

## 2020-01-01 RX ORDER — POLYETHYLENE GLYCOL 3350 17 G/17G
17 POWDER, FOR SOLUTION ORAL DAILY PRN
Status: DISCONTINUED | OUTPATIENT
Start: 2020-01-01 | End: 2021-01-01

## 2020-01-01 RX ORDER — FENTANYL CITRATE 50 UG/ML
INJECTION, SOLUTION INTRAMUSCULAR; INTRAVENOUS PRN
Status: DISCONTINUED | OUTPATIENT
Start: 2020-01-01 | End: 2020-01-01 | Stop reason: SDUPTHER

## 2020-01-01 RX ORDER — NALOXONE HYDROCHLORIDE 0.4 MG/ML
0.4 INJECTION, SOLUTION INTRAMUSCULAR; INTRAVENOUS; SUBCUTANEOUS PRN
Status: DISCONTINUED | OUTPATIENT
Start: 2020-01-01 | End: 2020-01-01

## 2020-01-01 RX ORDER — FENTANYL CITRATE 50 UG/ML
50 INJECTION, SOLUTION INTRAMUSCULAR; INTRAVENOUS EVERY 5 MIN PRN
Status: DISCONTINUED | OUTPATIENT
Start: 2020-01-01 | End: 2020-01-01

## 2020-01-01 RX ORDER — LIDOCAINE HYDROCHLORIDE 10 MG/ML
5 INJECTION, SOLUTION EPIDURAL; INFILTRATION; INTRACAUDAL; PERINEURAL ONCE
Status: DISCONTINUED | OUTPATIENT
Start: 2020-01-01 | End: 2021-01-01

## 2020-01-01 RX ORDER — LIDOCAINE HYDROCHLORIDE 20 MG/ML
INJECTION, SOLUTION INFILTRATION; PERINEURAL
Status: COMPLETED | OUTPATIENT
Start: 2020-01-01 | End: 2020-01-01

## 2020-01-01 RX ORDER — DEXAMETHASONE SODIUM PHOSPHATE 10 MG/ML
INJECTION, SOLUTION INTRAMUSCULAR; INTRAVENOUS PRN
Status: DISCONTINUED | OUTPATIENT
Start: 2020-01-01 | End: 2020-01-01 | Stop reason: SDUPTHER

## 2020-01-01 RX ORDER — OXYCODONE HYDROCHLORIDE 10 MG/1
20 TABLET ORAL EVERY 4 HOURS PRN
Status: DISCONTINUED | OUTPATIENT
Start: 2020-01-01 | End: 2021-01-01

## 2020-01-01 RX ORDER — HYDRALAZINE HYDROCHLORIDE 25 MG/1
25 TABLET, FILM COATED ORAL EVERY 8 HOURS SCHEDULED
Status: DISCONTINUED | OUTPATIENT
Start: 2020-01-01 | End: 2021-01-01

## 2020-01-01 RX ORDER — PANTOPRAZOLE SODIUM 40 MG/1
40 TABLET, DELAYED RELEASE ORAL
Status: DISCONTINUED | OUTPATIENT
Start: 2020-01-01 | End: 2020-01-01

## 2020-01-01 RX ORDER — SODIUM CHLORIDE 0.9 % (FLUSH) 0.9 %
10 SYRINGE (ML) INJECTION EVERY 12 HOURS SCHEDULED
Status: DISCONTINUED | OUTPATIENT
Start: 2020-01-01 | End: 2021-01-01

## 2020-01-01 RX ORDER — POTASSIUM CHLORIDE 20 MEQ/1
40 TABLET, EXTENDED RELEASE ORAL ONCE
Status: DISCONTINUED | OUTPATIENT
Start: 2020-01-01 | End: 2021-01-01

## 2020-01-01 RX ORDER — PROPOFOL 10 MG/ML
INJECTION, EMULSION INTRAVENOUS CONTINUOUS PRN
Status: DISCONTINUED | OUTPATIENT
Start: 2020-01-01 | End: 2020-01-01 | Stop reason: SDUPTHER

## 2020-01-01 RX ORDER — FUROSEMIDE 40 MG/1
40 TABLET ORAL DAILY
Status: DISCONTINUED | OUTPATIENT
Start: 2020-01-01 | End: 2020-01-01

## 2020-01-01 RX ADMIN — PANTOPRAZOLE SODIUM 40 MG: 40 TABLET, DELAYED RELEASE ORAL at 06:33

## 2020-01-01 RX ADMIN — GABAPENTIN 400 MG: 400 CAPSULE ORAL at 16:01

## 2020-01-01 RX ADMIN — OXYCODONE HYDROCHLORIDE 20 MG: 10 TABLET ORAL at 21:10

## 2020-01-01 RX ADMIN — FENTANYL CITRATE 100 MCG: 50 INJECTION, SOLUTION INTRAMUSCULAR; INTRAVENOUS at 12:12

## 2020-01-01 RX ADMIN — LIDOCAINE HYDROCHLORIDE 100 MG: 20 INJECTION, SOLUTION INTRAVENOUS at 12:11

## 2020-01-01 RX ADMIN — BISACODYL 10 MG: 10 SUPPOSITORY RECTAL at 15:03

## 2020-01-01 RX ADMIN — GABAPENTIN 400 MG: 400 CAPSULE ORAL at 21:48

## 2020-01-01 RX ADMIN — LORAZEPAM 1 MG: 2 INJECTION INTRAMUSCULAR; INTRAVENOUS at 16:42

## 2020-01-01 RX ADMIN — OXYCODONE HYDROCHLORIDE 20 MG: 10 TABLET ORAL at 17:00

## 2020-01-01 RX ADMIN — DEXAMETHASONE 4 MG: 4 TABLET ORAL at 06:33

## 2020-01-01 RX ADMIN — ACETAMINOPHEN 650 MG: 325 TABLET ORAL at 02:54

## 2020-01-01 RX ADMIN — AMLODIPINE BESYLATE 10 MG: 10 TABLET ORAL at 09:48

## 2020-01-01 RX ADMIN — DOCUSATE SODIUM 50 MG AND SENNOSIDES 8.6 MG 2 TABLET: 8.6; 5 TABLET, FILM COATED ORAL at 09:39

## 2020-01-01 RX ADMIN — GABAPENTIN 400 MG: 400 CAPSULE ORAL at 09:03

## 2020-01-01 RX ADMIN — FOLIC ACID 1 MG: 1 TABLET ORAL at 13:29

## 2020-01-01 RX ADMIN — METHADONE HYDROCHLORIDE 5 MG: 10 TABLET ORAL at 06:28

## 2020-01-01 RX ADMIN — POTASSIUM BICARBONATE 20 MEQ: 782 TABLET, EFFERVESCENT ORAL at 09:55

## 2020-01-01 RX ADMIN — FENTANYL CITRATE 50 MCG: 50 INJECTION, SOLUTION INTRAMUSCULAR; INTRAVENOUS at 15:12

## 2020-01-01 RX ADMIN — DOCUSATE SODIUM 50 MG AND SENNOSIDES 8.6 MG 2 TABLET: 8.6; 5 TABLET, FILM COATED ORAL at 09:50

## 2020-01-01 RX ADMIN — LORAZEPAM 1 MG: 2 INJECTION INTRAMUSCULAR; INTRAVENOUS at 19:29

## 2020-01-01 RX ADMIN — GABAPENTIN 400 MG: 400 CAPSULE ORAL at 21:36

## 2020-01-01 RX ADMIN — METHADONE HYDROCHLORIDE 5 MG: 10 TABLET ORAL at 20:11

## 2020-01-01 RX ADMIN — HYDRALAZINE HYDROCHLORIDE 75 MG: 50 TABLET, FILM COATED ORAL at 05:48

## 2020-01-01 RX ADMIN — LORAZEPAM 1 MG: 1 TABLET ORAL at 02:44

## 2020-01-01 RX ADMIN — GABAPENTIN 400 MG: 400 CAPSULE ORAL at 22:50

## 2020-01-01 RX ADMIN — DEXAMETHASONE 4 MG: 4 TABLET ORAL at 05:25

## 2020-01-01 RX ADMIN — GABAPENTIN 400 MG: 400 CAPSULE ORAL at 09:36

## 2020-01-01 RX ADMIN — HYDRALAZINE HYDROCHLORIDE 25 MG: 50 TABLET, FILM COATED ORAL at 21:03

## 2020-01-01 RX ADMIN — CARVEDILOL 25 MG: 25 TABLET, FILM COATED ORAL at 17:06

## 2020-01-01 RX ADMIN — OXYCODONE HYDROCHLORIDE 15 MG: 15 TABLET ORAL at 22:41

## 2020-01-01 RX ADMIN — MIRTAZAPINE 15 MG: 15 TABLET, FILM COATED ORAL at 21:47

## 2020-01-01 RX ADMIN — DEXAMETHASONE 4 MG: 4 TABLET ORAL at 13:56

## 2020-01-01 RX ADMIN — MORPHINE SULFATE: 1 INJECTION INTRAVENOUS at 23:32

## 2020-01-01 RX ADMIN — OXYCODONE HYDROCHLORIDE 20 MG: 10 TABLET ORAL at 16:10

## 2020-01-01 RX ADMIN — MORPHINE SULFATE 2 MG: 2 INJECTION, SOLUTION INTRAMUSCULAR; INTRAVENOUS at 02:54

## 2020-01-01 RX ADMIN — METHADONE HYDROCHLORIDE 5 MG: 10 TABLET ORAL at 17:00

## 2020-01-01 RX ADMIN — OXYCODONE HYDROCHLORIDE 20 MG: 10 TABLET ORAL at 00:15

## 2020-01-01 RX ADMIN — LORAZEPAM 1 MG: 1 TABLET ORAL at 01:30

## 2020-01-01 RX ADMIN — OXYCODONE HYDROCHLORIDE 15 MG: 15 TABLET ORAL at 17:14

## 2020-01-01 RX ADMIN — LORAZEPAM 1 MG: 2 INJECTION INTRAMUSCULAR; INTRAVENOUS at 00:52

## 2020-01-01 RX ADMIN — POTASSIUM BICARBONATE 20 MEQ: 782 TABLET, EFFERVESCENT ORAL at 08:55

## 2020-01-01 RX ADMIN — OXYCODONE HYDROCHLORIDE 20 MG: 10 TABLET ORAL at 16:56

## 2020-01-01 RX ADMIN — OXYCODONE HYDROCHLORIDE 10 MG: 10 TABLET ORAL at 08:30

## 2020-01-01 RX ADMIN — FOLIC ACID 1 MG: 1 TABLET ORAL at 09:55

## 2020-01-01 RX ADMIN — GABAPENTIN 400 MG: 400 CAPSULE ORAL at 09:31

## 2020-01-01 RX ADMIN — HYDRALAZINE HYDROCHLORIDE 75 MG: 50 TABLET, FILM COATED ORAL at 14:44

## 2020-01-01 RX ADMIN — PANTOPRAZOLE SODIUM 40 MG: 40 INJECTION, POWDER, FOR SOLUTION INTRAVENOUS at 10:45

## 2020-01-01 RX ADMIN — HYDRALAZINE HYDROCHLORIDE 25 MG: 50 TABLET, FILM COATED ORAL at 05:23

## 2020-01-01 RX ADMIN — POTASSIUM BICARBONATE 20 MEQ: 782 TABLET, EFFERVESCENT ORAL at 09:48

## 2020-01-01 RX ADMIN — CYCLOBENZAPRINE 10 MG: 10 TABLET, FILM COATED ORAL at 09:32

## 2020-01-01 RX ADMIN — FUROSEMIDE 20 MG: 20 TABLET ORAL at 09:32

## 2020-01-01 RX ADMIN — MIRTAZAPINE 15 MG: 15 TABLET, FILM COATED ORAL at 23:23

## 2020-01-01 RX ADMIN — POTASSIUM BICARBONATE 20 MEQ: 782 TABLET, EFFERVESCENT ORAL at 20:35

## 2020-01-01 RX ADMIN — LORAZEPAM 1 MG: 1 TABLET ORAL at 21:56

## 2020-01-01 RX ADMIN — CARVEDILOL 25 MG: 25 TABLET, FILM COATED ORAL at 17:16

## 2020-01-01 RX ADMIN — FUROSEMIDE 40 MG: 40 TABLET ORAL at 09:54

## 2020-01-01 RX ADMIN — GABAPENTIN 400 MG: 400 CAPSULE ORAL at 14:45

## 2020-01-01 RX ADMIN — GABAPENTIN 400 MG: 400 CAPSULE ORAL at 15:54

## 2020-01-01 RX ADMIN — POLYETHYLENE GLYCOL 3350 17 G: 17 POWDER, FOR SOLUTION ORAL at 09:13

## 2020-01-01 RX ADMIN — OXYCODONE HYDROCHLORIDE 15 MG: 15 TABLET ORAL at 10:46

## 2020-01-01 RX ADMIN — DEXAMETHASONE SODIUM PHOSPHATE 10 MG: 10 INJECTION INTRAMUSCULAR; INTRAVENOUS at 12:34

## 2020-01-01 RX ADMIN — LORAZEPAM 1 MG: 1 TABLET ORAL at 21:14

## 2020-01-01 RX ADMIN — Medication 3 MG: at 21:56

## 2020-01-01 RX ADMIN — OXYCODONE HYDROCHLORIDE 10 MG: 10 TABLET ORAL at 16:56

## 2020-01-01 RX ADMIN — HYDRALAZINE HYDROCHLORIDE 75 MG: 50 TABLET, FILM COATED ORAL at 06:50

## 2020-01-01 RX ADMIN — MORPHINE SULFATE: 1 INJECTION INTRAVENOUS at 17:16

## 2020-01-01 RX ADMIN — DIVALPROEX SODIUM 125 MG: 125 CAPSULE, COATED PELLETS ORAL at 09:46

## 2020-01-01 RX ADMIN — LORAZEPAM 1 MG: 1 TABLET ORAL at 13:55

## 2020-01-01 RX ADMIN — METHADONE HYDROCHLORIDE 5 MG: 10 TABLET ORAL at 14:15

## 2020-01-01 RX ADMIN — DEXAMETHASONE 4 MG: 4 TABLET ORAL at 17:01

## 2020-01-01 RX ADMIN — CARVEDILOL 25 MG: 25 TABLET, FILM COATED ORAL at 08:24

## 2020-01-01 RX ADMIN — HYDRALAZINE HYDROCHLORIDE 75 MG: 50 TABLET, FILM COATED ORAL at 14:15

## 2020-01-01 RX ADMIN — POTASSIUM BICARBONATE 20 MEQ: 782 TABLET, EFFERVESCENT ORAL at 21:25

## 2020-01-01 RX ADMIN — LORAZEPAM 1 MG: 1 TABLET ORAL at 20:35

## 2020-01-01 RX ADMIN — DOCUSATE SODIUM 50 MG AND SENNOSIDES 8.6 MG 2 TABLET: 8.6; 5 TABLET, FILM COATED ORAL at 09:57

## 2020-01-01 RX ADMIN — PROPOFOL 150 MCG/KG/MIN: 10 INJECTION, EMULSION INTRAVENOUS at 12:12

## 2020-01-01 RX ADMIN — PANTOPRAZOLE SODIUM 40 MG: 40 TABLET, DELAYED RELEASE ORAL at 06:59

## 2020-01-01 RX ADMIN — METHADONE HYDROCHLORIDE 5 MG: 10 TABLET ORAL at 21:35

## 2020-01-01 RX ADMIN — SODIUM CHLORIDE, PRESERVATIVE FREE 10 ML: 5 INJECTION INTRAVENOUS at 22:47

## 2020-01-01 RX ADMIN — POLYETHYLENE GLYCOL 3350 17 G: 17 POWDER, FOR SOLUTION ORAL at 12:36

## 2020-01-01 RX ADMIN — CARVEDILOL 25 MG: 25 TABLET, FILM COATED ORAL at 08:30

## 2020-01-01 RX ADMIN — GABAPENTIN 400 MG: 400 CAPSULE ORAL at 15:26

## 2020-01-01 RX ADMIN — LORAZEPAM 1 MG: 1 TABLET ORAL at 10:38

## 2020-01-01 RX ADMIN — AMLODIPINE BESYLATE 10 MG: 10 TABLET ORAL at 09:33

## 2020-01-01 RX ADMIN — METHADONE HYDROCHLORIDE 10 MG: 10 TABLET ORAL at 15:36

## 2020-01-01 RX ADMIN — METHADONE HYDROCHLORIDE 10 MG: 10 TABLET ORAL at 16:01

## 2020-01-01 RX ADMIN — DEXAMETHASONE 4 MG: 4 TABLET ORAL at 21:02

## 2020-01-01 RX ADMIN — IOVERSOL 1 ML: 678 INJECTION INTRA-ARTERIAL; INTRAVENOUS at 15:34

## 2020-01-01 RX ADMIN — HYDRALAZINE HYDROCHLORIDE 25 MG: 50 TABLET, FILM COATED ORAL at 21:24

## 2020-01-01 RX ADMIN — HYDRALAZINE HYDROCHLORIDE 25 MG: 50 TABLET, FILM COATED ORAL at 21:57

## 2020-01-01 RX ADMIN — HYDRALAZINE HYDROCHLORIDE 50 MG: 50 TABLET, FILM COATED ORAL at 21:52

## 2020-01-01 RX ADMIN — GABAPENTIN 400 MG: 400 CAPSULE ORAL at 20:10

## 2020-01-01 RX ADMIN — GABAPENTIN 400 MG: 400 CAPSULE ORAL at 09:56

## 2020-01-01 RX ADMIN — GABAPENTIN 400 MG: 400 CAPSULE ORAL at 21:12

## 2020-01-01 RX ADMIN — ENOXAPARIN SODIUM 40 MG: 40 INJECTION SUBCUTANEOUS at 09:46

## 2020-01-01 RX ADMIN — DEXAMETHASONE 4 MG: 4 TABLET ORAL at 23:05

## 2020-01-01 RX ADMIN — MIRTAZAPINE 15 MG: 15 TABLET, FILM COATED ORAL at 22:50

## 2020-01-01 RX ADMIN — DOCUSATE SODIUM 50 MG AND SENNOSIDES 8.6 MG 2 TABLET: 8.6; 5 TABLET, FILM COATED ORAL at 17:01

## 2020-01-01 RX ADMIN — DEXAMETHASONE 4 MG: 4 TABLET ORAL at 21:13

## 2020-01-01 RX ADMIN — DOCUSATE SODIUM 50 MG AND SENNOSIDES 8.6 MG 2 TABLET: 8.6; 5 TABLET, FILM COATED ORAL at 22:47

## 2020-01-01 RX ADMIN — ENOXAPARIN SODIUM 40 MG: 40 INJECTION SUBCUTANEOUS at 09:54

## 2020-01-01 RX ADMIN — OXYCODONE HYDROCHLORIDE 20 MG: 10 TABLET ORAL at 10:36

## 2020-01-01 RX ADMIN — Medication 10 ML: at 08:28

## 2020-01-01 RX ADMIN — POTASSIUM BICARBONATE 20 MEQ: 782 TABLET, EFFERVESCENT ORAL at 23:06

## 2020-01-01 RX ADMIN — AMLODIPINE BESYLATE 10 MG: 10 TABLET ORAL at 08:30

## 2020-01-01 RX ADMIN — METHADONE HYDROCHLORIDE 10 MG: 10 TABLET ORAL at 06:06

## 2020-01-01 RX ADMIN — Medication 10 ML: at 10:36

## 2020-01-01 RX ADMIN — DEXAMETHASONE 4 MG: 4 TABLET ORAL at 23:24

## 2020-01-01 RX ADMIN — CARVEDILOL 25 MG: 25 TABLET, FILM COATED ORAL at 09:56

## 2020-01-01 RX ADMIN — GABAPENTIN 400 MG: 400 CAPSULE ORAL at 23:54

## 2020-01-01 RX ADMIN — DEXAMETHASONE 4 MG: 4 TABLET ORAL at 05:19

## 2020-01-01 RX ADMIN — DEXAMETHASONE 4 MG: 4 TABLET ORAL at 06:59

## 2020-01-01 RX ADMIN — GABAPENTIN 200 MG: 100 CAPSULE ORAL at 15:45

## 2020-01-01 RX ADMIN — ACETAMINOPHEN 650 MG: 325 TABLET ORAL at 17:41

## 2020-01-01 RX ADMIN — SODIUM CHLORIDE, PRESERVATIVE FREE 10 ML: 5 INJECTION INTRAVENOUS at 08:30

## 2020-01-01 RX ADMIN — MIDAZOLAM 1 MG: 1 INJECTION INTRAMUSCULAR; INTRAVENOUS at 15:12

## 2020-01-01 RX ADMIN — METHADONE HYDROCHLORIDE 5 MG: 10 TABLET ORAL at 06:50

## 2020-01-01 RX ADMIN — METHADONE HYDROCHLORIDE 10 MG: 10 TABLET ORAL at 22:49

## 2020-01-01 RX ADMIN — METHADONE HYDROCHLORIDE 10 MG: 10 TABLET ORAL at 22:46

## 2020-01-01 RX ADMIN — PROPOFOL 200 MG: 10 INJECTION, EMULSION INTRAVENOUS at 12:11

## 2020-01-01 RX ADMIN — FOLIC ACID 1 MG: 1 TABLET ORAL at 09:32

## 2020-01-01 RX ADMIN — DEXAMETHASONE 4 MG: 4 TABLET ORAL at 15:09

## 2020-01-01 RX ADMIN — METHYLNALTREXONE BROMIDE 12 MG: 12 INJECTION, SOLUTION SUBCUTANEOUS at 20:10

## 2020-01-01 RX ADMIN — METHADONE HYDROCHLORIDE 10 MG: 10 TABLET ORAL at 06:11

## 2020-01-01 RX ADMIN — CARVEDILOL 25 MG: 25 TABLET, FILM COATED ORAL at 09:40

## 2020-01-01 RX ADMIN — MORPHINE SULFATE 2 MG: 2 INJECTION, SOLUTION INTRAMUSCULAR; INTRAVENOUS at 15:44

## 2020-01-01 RX ADMIN — GABAPENTIN 200 MG: 100 CAPSULE ORAL at 22:48

## 2020-01-01 RX ADMIN — CARVEDILOL 25 MG: 25 TABLET, FILM COATED ORAL at 16:56

## 2020-01-01 RX ADMIN — LORAZEPAM 1 MG: 2 INJECTION INTRAMUSCULAR; INTRAVENOUS at 10:37

## 2020-01-01 RX ADMIN — POTASSIUM CHLORIDE 40 MEQ: 1500 TABLET, EXTENDED RELEASE ORAL at 09:12

## 2020-01-01 RX ADMIN — HYDRALAZINE HYDROCHLORIDE 75 MG: 50 TABLET, FILM COATED ORAL at 22:57

## 2020-01-01 RX ADMIN — LORAZEPAM 1 MG: 1 TABLET ORAL at 12:41

## 2020-01-01 RX ADMIN — LORAZEPAM 1 MG: 1 TABLET ORAL at 08:25

## 2020-01-01 RX ADMIN — FUROSEMIDE 20 MG: 20 TABLET ORAL at 09:34

## 2020-01-01 RX ADMIN — LORAZEPAM 1 MG: 1 TABLET ORAL at 13:42

## 2020-01-01 RX ADMIN — CARVEDILOL 25 MG: 25 TABLET, FILM COATED ORAL at 10:46

## 2020-01-01 RX ADMIN — Medication 10 ML: at 22:54

## 2020-01-01 RX ADMIN — FOLIC ACID 1 MG: 1 TABLET ORAL at 09:40

## 2020-01-01 RX ADMIN — LORAZEPAM 1 MG: 1 TABLET ORAL at 15:50

## 2020-01-01 RX ADMIN — TC 99M MEDRONATE 28 MILLICURIE: 20 INJECTION, POWDER, LYOPHILIZED, FOR SOLUTION INTRAVENOUS at 08:37

## 2020-01-01 RX ADMIN — GABAPENTIN 400 MG: 400 CAPSULE ORAL at 15:36

## 2020-01-01 RX ADMIN — CARVEDILOL 25 MG: 25 TABLET, FILM COATED ORAL at 21:01

## 2020-01-01 RX ADMIN — CARVEDILOL 25 MG: 25 TABLET, FILM COATED ORAL at 16:34

## 2020-01-01 RX ADMIN — METHADONE HYDROCHLORIDE 5 MG: 10 TABLET ORAL at 21:51

## 2020-01-01 RX ADMIN — OXYCODONE HYDROCHLORIDE 20 MG: 10 TABLET ORAL at 23:06

## 2020-01-01 RX ADMIN — MIRTAZAPINE 15 MG: 15 TABLET, FILM COATED ORAL at 22:51

## 2020-01-01 RX ADMIN — METHADONE HYDROCHLORIDE 5 MG: 10 TABLET ORAL at 21:48

## 2020-01-01 RX ADMIN — PANTOPRAZOLE SODIUM 40 MG: 40 TABLET, DELAYED RELEASE ORAL at 05:43

## 2020-01-01 RX ADMIN — FOLIC ACID 1 MG: 1 TABLET ORAL at 09:03

## 2020-01-01 RX ADMIN — MIRTAZAPINE 15 MG: 15 TABLET, FILM COATED ORAL at 23:05

## 2020-01-01 RX ADMIN — GABAPENTIN 400 MG: 400 CAPSULE ORAL at 15:00

## 2020-01-01 RX ADMIN — POTASSIUM BICARBONATE 20 MEQ: 782 TABLET, EFFERVESCENT ORAL at 09:37

## 2020-01-01 RX ADMIN — DEXAMETHASONE 4 MG: 4 TABLET ORAL at 20:51

## 2020-01-01 RX ADMIN — METHADONE HYDROCHLORIDE 5 MG: 10 TABLET ORAL at 13:28

## 2020-01-01 RX ADMIN — SODIUM CHLORIDE, PRESERVATIVE FREE 10 ML: 5 INJECTION INTRAVENOUS at 10:38

## 2020-01-01 RX ADMIN — OXYCODONE HYDROCHLORIDE 20 MG: 10 TABLET ORAL at 04:16

## 2020-01-01 RX ADMIN — ENOXAPARIN SODIUM 40 MG: 40 INJECTION SUBCUTANEOUS at 09:55

## 2020-01-01 RX ADMIN — LORAZEPAM 1 MG: 1 TABLET ORAL at 00:42

## 2020-01-01 RX ADMIN — OXYCODONE HYDROCHLORIDE 15 MG: 15 TABLET ORAL at 09:04

## 2020-01-01 RX ADMIN — ENOXAPARIN SODIUM 40 MG: 40 INJECTION SUBCUTANEOUS at 10:45

## 2020-01-01 RX ADMIN — GABAPENTIN 400 MG: 400 CAPSULE ORAL at 09:12

## 2020-01-01 RX ADMIN — LIDOCAINE HYDROCHLORIDE 18 ML: 20 INJECTION, SOLUTION INFILTRATION; PERINEURAL at 15:10

## 2020-01-01 RX ADMIN — GABAPENTIN 400 MG: 400 CAPSULE ORAL at 13:29

## 2020-01-01 RX ADMIN — POLYETHYLENE GLYCOL 3350 17 G: 17 POWDER, FOR SOLUTION ORAL at 10:32

## 2020-01-01 RX ADMIN — GABAPENTIN 400 MG: 400 CAPSULE ORAL at 14:15

## 2020-01-01 RX ADMIN — ENOXAPARIN SODIUM 40 MG: 40 INJECTION SUBCUTANEOUS at 09:33

## 2020-01-01 RX ADMIN — METHADONE HYDROCHLORIDE 5 MG: 10 TABLET ORAL at 05:50

## 2020-01-01 RX ADMIN — HYDRALAZINE HYDROCHLORIDE 25 MG: 50 TABLET, FILM COATED ORAL at 15:37

## 2020-01-01 RX ADMIN — DEXAMETHASONE 4 MG: 4 TABLET ORAL at 15:36

## 2020-01-01 RX ADMIN — HYDRALAZINE HYDROCHLORIDE 25 MG: 50 TABLET, FILM COATED ORAL at 20:52

## 2020-01-01 RX ADMIN — POTASSIUM BICARBONATE 20 MEQ: 782 TABLET, EFFERVESCENT ORAL at 15:26

## 2020-01-01 RX ADMIN — FOLIC ACID 1 MG: 1 TABLET ORAL at 09:36

## 2020-01-01 RX ADMIN — POTASSIUM BICARBONATE 20 MEQ: 782 TABLET, EFFERVESCENT ORAL at 22:42

## 2020-01-01 RX ADMIN — OXYCODONE HYDROCHLORIDE 15 MG: 15 TABLET ORAL at 04:22

## 2020-01-01 RX ADMIN — DOCUSATE SODIUM 50 MG AND SENNOSIDES 8.6 MG 2 TABLET: 8.6; 5 TABLET, FILM COATED ORAL at 16:29

## 2020-01-01 RX ADMIN — GABAPENTIN 400 MG: 400 CAPSULE ORAL at 13:55

## 2020-01-01 RX ADMIN — CARVEDILOL 25 MG: 25 TABLET, FILM COATED ORAL at 09:31

## 2020-01-01 RX ADMIN — FOLIC ACID 1 MG: 1 TABLET ORAL at 08:27

## 2020-01-01 RX ADMIN — DEXAMETHASONE 4 MG: 4 TABLET ORAL at 16:30

## 2020-01-01 RX ADMIN — OXYCODONE HYDROCHLORIDE 20 MG: 10 TABLET ORAL at 09:41

## 2020-01-01 RX ADMIN — POTASSIUM BICARBONATE 20 MEQ: 782 TABLET, EFFERVESCENT ORAL at 23:29

## 2020-01-01 RX ADMIN — MIRTAZAPINE 15 MG: 15 TABLET, FILM COATED ORAL at 21:35

## 2020-01-01 RX ADMIN — FUROSEMIDE 20 MG: 20 TABLET ORAL at 13:30

## 2020-01-01 RX ADMIN — DEXAMETHASONE 4 MG: 4 TABLET ORAL at 22:43

## 2020-01-01 RX ADMIN — GABAPENTIN 400 MG: 400 CAPSULE ORAL at 13:28

## 2020-01-01 RX ADMIN — METHADONE HYDROCHLORIDE 5 MG: 10 TABLET ORAL at 16:29

## 2020-01-01 RX ADMIN — MIRTAZAPINE 15 MG: 15 TABLET, FILM COATED ORAL at 22:42

## 2020-01-01 RX ADMIN — LORAZEPAM 1 MG: 1 TABLET ORAL at 17:16

## 2020-01-01 RX ADMIN — METHADONE HYDROCHLORIDE 10 MG: 10 TABLET ORAL at 13:30

## 2020-01-01 RX ADMIN — POTASSIUM BICARBONATE 20 MEQ: 782 TABLET, EFFERVESCENT ORAL at 20:10

## 2020-01-01 RX ADMIN — POTASSIUM BICARBONATE 20 MEQ: 782 TABLET, EFFERVESCENT ORAL at 21:03

## 2020-01-01 RX ADMIN — LORAZEPAM 1 MG: 1 TABLET ORAL at 06:15

## 2020-01-01 RX ADMIN — ENOXAPARIN SODIUM 40 MG: 40 INJECTION SUBCUTANEOUS at 13:29

## 2020-01-01 RX ADMIN — GABAPENTIN 400 MG: 400 CAPSULE ORAL at 23:06

## 2020-01-01 RX ADMIN — DOCUSATE SODIUM 50 MG AND SENNOSIDES 8.6 MG 2 TABLET: 8.6; 5 TABLET, FILM COATED ORAL at 13:30

## 2020-01-01 RX ADMIN — CARVEDILOL 25 MG: 25 TABLET, FILM COATED ORAL at 17:54

## 2020-01-01 RX ADMIN — Medication 10 ML: at 08:30

## 2020-01-01 RX ADMIN — METHADONE HYDROCHLORIDE 5 MG: 10 TABLET ORAL at 21:02

## 2020-01-01 RX ADMIN — POTASSIUM BICARBONATE 20 MEQ: 782 TABLET, EFFERVESCENT ORAL at 21:51

## 2020-01-01 RX ADMIN — DOCUSATE SODIUM 50 MG AND SENNOSIDES 8.6 MG 2 TABLET: 8.6; 5 TABLET, FILM COATED ORAL at 17:16

## 2020-01-01 RX ADMIN — ENOXAPARIN SODIUM 40 MG: 40 INJECTION SUBCUTANEOUS at 09:12

## 2020-01-01 RX ADMIN — LORAZEPAM 1 MG: 1 TABLET ORAL at 05:12

## 2020-01-01 RX ADMIN — POTASSIUM BICARBONATE 20 MEQ: 782 TABLET, EFFERVESCENT ORAL at 21:56

## 2020-01-01 RX ADMIN — DOCUSATE SODIUM 100 MG: 100 CAPSULE ORAL at 15:26

## 2020-01-01 RX ADMIN — CYCLOBENZAPRINE 10 MG: 10 TABLET, FILM COATED ORAL at 02:54

## 2020-01-01 RX ADMIN — SODIUM CHLORIDE, PRESERVATIVE FREE 10 ML: 5 INJECTION INTRAVENOUS at 18:30

## 2020-01-01 RX ADMIN — AMLODIPINE BESYLATE 10 MG: 10 TABLET ORAL at 10:04

## 2020-01-01 RX ADMIN — OXYCODONE HYDROCHLORIDE 15 MG: 15 TABLET ORAL at 10:31

## 2020-01-01 RX ADMIN — DEXAMETHASONE 4 MG: 4 TABLET ORAL at 20:10

## 2020-01-01 RX ADMIN — GABAPENTIN 400 MG: 400 CAPSULE ORAL at 23:23

## 2020-01-01 RX ADMIN — FUROSEMIDE 40 MG: 40 TABLET ORAL at 09:32

## 2020-01-01 RX ADMIN — FUROSEMIDE 40 MG: 40 TABLET ORAL at 09:47

## 2020-01-01 RX ADMIN — MORPHINE SULFATE: 1 INJECTION INTRAVENOUS at 21:07

## 2020-01-01 RX ADMIN — POTASSIUM BICARBONATE 20 MEQ: 782 TABLET, EFFERVESCENT ORAL at 20:51

## 2020-01-01 RX ADMIN — METHADONE HYDROCHLORIDE 10 MG: 10 TABLET ORAL at 05:12

## 2020-01-01 RX ADMIN — OXYCODONE HYDROCHLORIDE 20 MG: 10 TABLET ORAL at 05:22

## 2020-01-01 RX ADMIN — DEXAMETHASONE 4 MG: 4 TABLET ORAL at 05:21

## 2020-01-01 RX ADMIN — OXYCODONE HYDROCHLORIDE 20 MG: 10 TABLET ORAL at 12:02

## 2020-01-01 RX ADMIN — HYDRALAZINE HYDROCHLORIDE 25 MG: 50 TABLET, FILM COATED ORAL at 05:21

## 2020-01-01 RX ADMIN — METHADONE HYDROCHLORIDE 5 MG: 10 TABLET ORAL at 23:54

## 2020-01-01 RX ADMIN — LORAZEPAM 1 MG: 1 TABLET ORAL at 21:24

## 2020-01-01 RX ADMIN — METHADONE HYDROCHLORIDE 10 MG: 10 TABLET ORAL at 20:51

## 2020-01-01 RX ADMIN — GABAPENTIN 200 MG: 100 CAPSULE ORAL at 09:46

## 2020-01-01 RX ADMIN — HYDRALAZINE HYDROCHLORIDE 25 MG: 50 TABLET, FILM COATED ORAL at 15:29

## 2020-01-01 RX ADMIN — DIVALPROEX SODIUM 125 MG: 125 CAPSULE, COATED PELLETS ORAL at 21:12

## 2020-01-01 RX ADMIN — GABAPENTIN 400 MG: 400 CAPSULE ORAL at 10:33

## 2020-01-01 RX ADMIN — ENOXAPARIN SODIUM 40 MG: 40 INJECTION SUBCUTANEOUS at 08:39

## 2020-01-01 RX ADMIN — HYDRALAZINE HYDROCHLORIDE 75 MG: 50 TABLET, FILM COATED ORAL at 15:45

## 2020-01-01 RX ADMIN — GABAPENTIN 400 MG: 400 CAPSULE ORAL at 10:36

## 2020-01-01 RX ADMIN — METHADONE HYDROCHLORIDE 5 MG: 10 TABLET ORAL at 21:23

## 2020-01-01 RX ADMIN — OXYCODONE HYDROCHLORIDE 10 MG: 10 TABLET ORAL at 10:03

## 2020-01-01 RX ADMIN — HYDRALAZINE HYDROCHLORIDE 75 MG: 50 TABLET, FILM COATED ORAL at 05:42

## 2020-01-01 RX ADMIN — MIRTAZAPINE 15 MG: 15 TABLET, FILM COATED ORAL at 20:51

## 2020-01-01 RX ADMIN — GABAPENTIN 400 MG: 400 CAPSULE ORAL at 21:53

## 2020-01-01 RX ADMIN — GABAPENTIN 400 MG: 400 CAPSULE ORAL at 20:35

## 2020-01-01 RX ADMIN — HYDRALAZINE HYDROCHLORIDE 25 MG: 50 TABLET, FILM COATED ORAL at 16:35

## 2020-01-01 RX ADMIN — CARVEDILOL 25 MG: 25 TABLET, FILM COATED ORAL at 16:06

## 2020-01-01 RX ADMIN — POTASSIUM BICARBONATE 20 MEQ: 782 TABLET, EFFERVESCENT ORAL at 09:31

## 2020-01-01 RX ADMIN — METHADONE HYDROCHLORIDE 10 MG: 10 TABLET ORAL at 06:49

## 2020-01-01 RX ADMIN — METHADONE HYDROCHLORIDE 5 MG: 10 TABLET ORAL at 22:54

## 2020-01-01 RX ADMIN — ENOXAPARIN SODIUM 40 MG: 40 INJECTION SUBCUTANEOUS at 09:40

## 2020-01-01 RX ADMIN — PANTOPRAZOLE SODIUM 40 MG: 40 TABLET, DELAYED RELEASE ORAL at 06:11

## 2020-01-01 RX ADMIN — METHADONE HYDROCHLORIDE 10 MG: 10 TABLET ORAL at 23:06

## 2020-01-01 RX ADMIN — DOCUSATE SODIUM 50 MG AND SENNOSIDES 8.6 MG 2 TABLET: 8.6; 5 TABLET, FILM COATED ORAL at 09:36

## 2020-01-01 RX ADMIN — MIRTAZAPINE 15 MG: 15 TABLET, FILM COATED ORAL at 21:57

## 2020-01-01 RX ADMIN — GABAPENTIN 400 MG: 400 CAPSULE ORAL at 20:51

## 2020-01-01 RX ADMIN — HYDRALAZINE HYDROCHLORIDE 25 MG: 50 TABLET, FILM COATED ORAL at 14:28

## 2020-01-01 RX ADMIN — GABAPENTIN 400 MG: 400 CAPSULE ORAL at 22:42

## 2020-01-01 RX ADMIN — MORPHINE SULFATE 4 MG: 4 INJECTION, SOLUTION INTRAMUSCULAR; INTRAVENOUS at 18:28

## 2020-01-01 RX ADMIN — POLYETHYLENE GLYCOL 3350 17 G: 17 POWDER, FOR SOLUTION ORAL at 09:05

## 2020-01-01 RX ADMIN — MIRTAZAPINE 15 MG: 15 TABLET, FILM COATED ORAL at 20:29

## 2020-01-01 RX ADMIN — OXYCODONE HYDROCHLORIDE 20 MG: 10 TABLET ORAL at 09:55

## 2020-01-01 RX ADMIN — CARVEDILOL 25 MG: 25 TABLET, FILM COATED ORAL at 19:14

## 2020-01-01 RX ADMIN — ENOXAPARIN SODIUM 40 MG: 40 INJECTION SUBCUTANEOUS at 23:00

## 2020-01-01 RX ADMIN — PANTOPRAZOLE SODIUM 40 MG: 40 TABLET, DELAYED RELEASE ORAL at 05:12

## 2020-01-01 RX ADMIN — FUROSEMIDE 20 MG: 20 TABLET ORAL at 09:36

## 2020-01-01 RX ADMIN — OXYCODONE HYDROCHLORIDE 15 MG: 15 TABLET ORAL at 22:50

## 2020-01-01 RX ADMIN — FOLIC ACID 1 MG: 1 TABLET ORAL at 16:12

## 2020-01-01 RX ADMIN — HYDRALAZINE HYDROCHLORIDE 75 MG: 50 TABLET, FILM COATED ORAL at 06:09

## 2020-01-01 RX ADMIN — OXYCODONE HYDROCHLORIDE 20 MG: 10 TABLET ORAL at 05:19

## 2020-01-01 RX ADMIN — GABAPENTIN 400 MG: 400 CAPSULE ORAL at 09:32

## 2020-01-01 RX ADMIN — POTASSIUM BICARBONATE 20 MEQ: 782 TABLET, EFFERVESCENT ORAL at 10:32

## 2020-01-01 RX ADMIN — PANTOPRAZOLE SODIUM 40 MG: 40 TABLET, DELAYED RELEASE ORAL at 06:14

## 2020-01-01 RX ADMIN — HYDRALAZINE HYDROCHLORIDE 25 MG: 50 TABLET, FILM COATED ORAL at 05:12

## 2020-01-01 RX ADMIN — LORAZEPAM 1 MG: 2 INJECTION INTRAMUSCULAR; INTRAVENOUS at 04:24

## 2020-01-01 RX ADMIN — AMLODIPINE BESYLATE 10 MG: 10 TABLET ORAL at 10:46

## 2020-01-01 RX ADMIN — PANTOPRAZOLE SODIUM 40 MG: 40 INJECTION, POWDER, FOR SOLUTION INTRAVENOUS at 08:39

## 2020-01-01 RX ADMIN — METHADONE HYDROCHLORIDE 5 MG: 10 TABLET ORAL at 13:48

## 2020-01-01 RX ADMIN — GABAPENTIN 400 MG: 400 CAPSULE ORAL at 21:13

## 2020-01-01 RX ADMIN — FOLIC ACID 1 MG: 1 TABLET ORAL at 09:46

## 2020-01-01 RX ADMIN — DEXAMETHASONE 4 MG: 4 TABLET ORAL at 13:48

## 2020-01-01 RX ADMIN — GABAPENTIN 400 MG: 400 CAPSULE ORAL at 08:25

## 2020-01-01 RX ADMIN — POTASSIUM BICARBONATE 20 MEQ: 782 TABLET, EFFERVESCENT ORAL at 22:44

## 2020-01-01 RX ADMIN — DEXAMETHASONE 4 MG: 4 TABLET ORAL at 14:24

## 2020-01-01 RX ADMIN — OXYCODONE HYDROCHLORIDE 20 MG: 10 TABLET ORAL at 06:12

## 2020-01-01 RX ADMIN — METHADONE HYDROCHLORIDE 10 MG: 10 TABLET ORAL at 06:14

## 2020-01-01 RX ADMIN — DEXAMETHASONE SODIUM PHOSPHATE 10 MG: 4 INJECTION, SOLUTION INTRAMUSCULAR; INTRAVENOUS at 09:45

## 2020-01-01 RX ADMIN — SODIUM CHLORIDE, PRESERVATIVE FREE 10 ML: 5 INJECTION INTRAVENOUS at 10:10

## 2020-01-01 RX ADMIN — HYDRALAZINE HYDROCHLORIDE 25 MG: 50 TABLET, FILM COATED ORAL at 13:48

## 2020-01-01 RX ADMIN — MIRTAZAPINE 15 MG: 15 TABLET, FILM COATED ORAL at 21:02

## 2020-01-01 RX ADMIN — FOLIC ACID 1 MG: 1 TABLET ORAL at 09:31

## 2020-01-01 RX ADMIN — GABAPENTIN 200 MG: 100 CAPSULE ORAL at 09:55

## 2020-01-01 RX ADMIN — SODIUM CHLORIDE, PRESERVATIVE FREE 10 ML: 5 INJECTION INTRAVENOUS at 12:30

## 2020-01-01 RX ADMIN — HYDRALAZINE HYDROCHLORIDE 75 MG: 50 TABLET, FILM COATED ORAL at 06:28

## 2020-01-01 RX ADMIN — Medication: at 09:32

## 2020-01-01 RX ADMIN — DEXAMETHASONE 4 MG: 4 TABLET ORAL at 21:23

## 2020-01-01 RX ADMIN — ORPHENADRINE CITRATE 100 MG: 100 TABLET, EXTENDED RELEASE ORAL at 12:34

## 2020-01-01 RX ADMIN — METHADONE HYDROCHLORIDE 5 MG: 10 TABLET ORAL at 05:18

## 2020-01-01 RX ADMIN — METHADONE HYDROCHLORIDE 5 MG: 10 TABLET ORAL at 04:42

## 2020-01-01 RX ADMIN — DEXAMETHASONE 4 MG: 4 TABLET ORAL at 06:06

## 2020-01-01 RX ADMIN — Medication 140 MG: at 12:11

## 2020-01-01 RX ADMIN — Medication 3 MG: at 00:42

## 2020-01-01 RX ADMIN — MORPHINE SULFATE 30 MG: 1 INJECTION INTRAVENOUS at 15:18

## 2020-01-01 RX ADMIN — DOCUSATE SODIUM 50 MG AND SENNOSIDES 8.6 MG 2 TABLET: 8.6; 5 TABLET, FILM COATED ORAL at 18:22

## 2020-01-01 RX ADMIN — OXYCODONE HYDROCHLORIDE 20 MG: 10 TABLET ORAL at 21:24

## 2020-01-01 RX ADMIN — POTASSIUM BICARBONATE 20 MEQ: 782 TABLET, EFFERVESCENT ORAL at 09:39

## 2020-01-01 RX ADMIN — Medication 10 ML: at 10:47

## 2020-01-01 RX ADMIN — OXYCODONE HYDROCHLORIDE 15 MG: 15 TABLET ORAL at 13:08

## 2020-01-01 RX ADMIN — CARVEDILOL 25 MG: 25 TABLET, FILM COATED ORAL at 09:33

## 2020-01-01 RX ADMIN — ENOXAPARIN SODIUM 40 MG: 40 INJECTION SUBCUTANEOUS at 09:31

## 2020-01-01 RX ADMIN — PANTOPRAZOLE SODIUM 40 MG: 40 TABLET, DELAYED RELEASE ORAL at 05:22

## 2020-01-01 RX ADMIN — DIVALPROEX SODIUM 125 MG: 125 CAPSULE, COATED PELLETS ORAL at 13:31

## 2020-01-01 RX ADMIN — ONDANSETRON HYDROCHLORIDE 4 MG: 2 INJECTION, SOLUTION INTRAMUSCULAR; INTRAVENOUS at 12:40

## 2020-01-01 RX ADMIN — METHADONE HYDROCHLORIDE 5 MG: 10 TABLET ORAL at 23:21

## 2020-01-01 RX ADMIN — GABAPENTIN 400 MG: 400 CAPSULE ORAL at 16:29

## 2020-01-01 RX ADMIN — FOLIC ACID 1 MG: 1 TABLET ORAL at 10:45

## 2020-01-01 RX ADMIN — FUROSEMIDE 20 MG: 20 TABLET ORAL at 09:40

## 2020-01-01 RX ADMIN — PANTOPRAZOLE SODIUM 40 MG: 40 INJECTION, POWDER, FOR SOLUTION INTRAVENOUS at 10:37

## 2020-01-01 RX ADMIN — OXYCODONE HYDROCHLORIDE 15 MG: 15 TABLET ORAL at 08:24

## 2020-01-01 RX ADMIN — MIRTAZAPINE 15 MG: 15 TABLET, FILM COATED ORAL at 21:50

## 2020-01-01 RX ADMIN — FUROSEMIDE 40 MG: 40 TABLET ORAL at 10:46

## 2020-01-01 RX ADMIN — DIVALPROEX SODIUM 125 MG: 125 CAPSULE, COATED PELLETS ORAL at 22:41

## 2020-01-01 RX ADMIN — GABAPENTIN 400 MG: 400 CAPSULE ORAL at 10:00

## 2020-01-01 RX ADMIN — Medication 0.6 MG: at 12:45

## 2020-01-01 RX ADMIN — HYDRALAZINE HYDROCHLORIDE 75 MG: 50 TABLET, FILM COATED ORAL at 21:35

## 2020-01-01 RX ADMIN — OXYCODONE HYDROCHLORIDE 15 MG: 15 TABLET ORAL at 12:45

## 2020-01-01 RX ADMIN — PANTOPRAZOLE SODIUM 40 MG: 40 INJECTION, POWDER, FOR SOLUTION INTRAVENOUS at 10:09

## 2020-01-01 RX ADMIN — OXYCODONE HYDROCHLORIDE 20 MG: 10 TABLET ORAL at 17:56

## 2020-01-01 RX ADMIN — LORAZEPAM 1 MG: 1 TABLET ORAL at 09:47

## 2020-01-01 RX ADMIN — DIVALPROEX SODIUM 125 MG: 125 CAPSULE, COATED PELLETS ORAL at 08:00

## 2020-01-01 RX ADMIN — METHADONE HYDROCHLORIDE 10 MG: 10 TABLET ORAL at 21:56

## 2020-01-01 RX ADMIN — OXYCODONE HYDROCHLORIDE 20 MG: 10 TABLET ORAL at 20:52

## 2020-01-01 RX ADMIN — HYDRALAZINE HYDROCHLORIDE 25 MG: 50 TABLET, FILM COATED ORAL at 23:22

## 2020-01-01 RX ADMIN — CARVEDILOL 25 MG: 25 TABLET, FILM COATED ORAL at 09:36

## 2020-01-01 RX ADMIN — FUROSEMIDE 40 MG: 40 TABLET ORAL at 09:12

## 2020-01-01 RX ADMIN — MORPHINE SULFATE: 1 INJECTION INTRAVENOUS at 10:15

## 2020-01-01 RX ADMIN — CARVEDILOL 25 MG: 25 TABLET, FILM COATED ORAL at 17:23

## 2020-01-01 RX ADMIN — PANTOPRAZOLE SODIUM 40 MG: 40 INJECTION, POWDER, FOR SOLUTION INTRAVENOUS at 09:46

## 2020-01-01 RX ADMIN — POLYETHYLENE GLYCOL 3350 17 G: 17 POWDER, FOR SOLUTION ORAL at 09:46

## 2020-01-01 RX ADMIN — SODIUM CHLORIDE, PRESERVATIVE FREE 10 ML: 5 INJECTION INTRAVENOUS at 09:48

## 2020-01-01 RX ADMIN — CARVEDILOL 25 MG: 25 TABLET, FILM COATED ORAL at 09:46

## 2020-01-01 RX ADMIN — MORPHINE SULFATE: 1 INJECTION INTRAVENOUS at 03:29

## 2020-01-01 RX ADMIN — POTASSIUM CHLORIDE 40 MEQ: 1500 TABLET, EXTENDED RELEASE ORAL at 10:49

## 2020-01-01 RX ADMIN — POLYETHYLENE GLYCOL 3350 17 G: 17 POWDER, FOR SOLUTION ORAL at 23:00

## 2020-01-01 RX ADMIN — HYDRALAZINE HYDROCHLORIDE 25 MG: 50 TABLET, FILM COATED ORAL at 06:33

## 2020-01-01 RX ADMIN — DEXAMETHASONE SODIUM PHOSPHATE 12 MG: 4 INJECTION, SOLUTION INTRAMUSCULAR; INTRAVENOUS at 23:18

## 2020-01-01 RX ADMIN — OXYCODONE HYDROCHLORIDE 20 MG: 10 TABLET ORAL at 09:39

## 2020-01-01 RX ADMIN — OXYCODONE HYDROCHLORIDE 15 MG: 15 TABLET ORAL at 13:28

## 2020-01-01 RX ADMIN — OXYCODONE HYDROCHLORIDE 15 MG: 15 TABLET ORAL at 06:54

## 2020-01-01 RX ADMIN — PANTOPRAZOLE SODIUM 40 MG: 40 INJECTION, POWDER, FOR SOLUTION INTRAVENOUS at 22:46

## 2020-01-01 RX ADMIN — METHADONE HYDROCHLORIDE 5 MG: 10 TABLET ORAL at 16:35

## 2020-01-01 RX ADMIN — FOLIC ACID 1 MG: 1 TABLET ORAL at 09:12

## 2020-01-01 RX ADMIN — HYDRALAZINE HYDROCHLORIDE 75 MG: 50 TABLET, FILM COATED ORAL at 23:10

## 2020-01-01 RX ADMIN — MIDAZOLAM 2 MG: 1 INJECTION INTRAMUSCULAR; INTRAVENOUS at 11:53

## 2020-01-01 RX ADMIN — OXYCODONE HYDROCHLORIDE 20 MG: 10 TABLET ORAL at 09:49

## 2020-01-01 RX ADMIN — OXYCODONE HYDROCHLORIDE 20 MG: 10 TABLET ORAL at 16:30

## 2020-01-01 RX ADMIN — MIRTAZAPINE 15 MG: 15 TABLET, FILM COATED ORAL at 21:13

## 2020-01-01 RX ADMIN — OXYCODONE HYDROCHLORIDE 15 MG: 15 TABLET ORAL at 15:55

## 2020-01-01 RX ADMIN — OXYCODONE HYDROCHLORIDE 20 MG: 10 TABLET ORAL at 13:55

## 2020-01-01 RX ADMIN — HYDRALAZINE HYDROCHLORIDE 25 MG: 50 TABLET, FILM COATED ORAL at 23:06

## 2020-01-01 RX ADMIN — GABAPENTIN 400 MG: 400 CAPSULE ORAL at 17:01

## 2020-01-01 RX ADMIN — METHADONE HYDROCHLORIDE 5 MG: 10 TABLET ORAL at 14:45

## 2020-01-01 RX ADMIN — AMLODIPINE BESYLATE 10 MG: 10 TABLET ORAL at 08:27

## 2020-01-01 RX ADMIN — OXYCODONE HYDROCHLORIDE 20 MG: 10 TABLET ORAL at 01:30

## 2020-01-01 RX ADMIN — SODIUM CHLORIDE 1000 ML: 9 INJECTION, SOLUTION INTRAVENOUS at 20:47

## 2020-01-01 RX ADMIN — MORPHINE SULFATE 4 MG: 4 INJECTION, SOLUTION INTRAMUSCULAR; INTRAVENOUS at 17:23

## 2020-01-01 RX ADMIN — ENOXAPARIN SODIUM 40 MG: 40 INJECTION SUBCUTANEOUS at 10:39

## 2020-01-01 RX ADMIN — OXYCODONE HYDROCHLORIDE 20 MG: 10 TABLET ORAL at 02:45

## 2020-01-01 RX ADMIN — DEXAMETHASONE 4 MG: 4 TABLET ORAL at 16:03

## 2020-01-01 RX ADMIN — GABAPENTIN 400 MG: 400 CAPSULE ORAL at 14:21

## 2020-01-01 RX ADMIN — DOCUSATE SODIUM 50 MG AND SENNOSIDES 8.6 MG 2 TABLET: 8.6; 5 TABLET, FILM COATED ORAL at 16:45

## 2020-01-01 RX ADMIN — FUROSEMIDE 40 MG: 40 TABLET ORAL at 10:30

## 2020-01-01 RX ADMIN — CARVEDILOL 25 MG: 25 TABLET, FILM COATED ORAL at 15:56

## 2020-01-01 RX ADMIN — KETOROLAC TROMETHAMINE 30 MG: 30 INJECTION, SOLUTION INTRAMUSCULAR at 12:30

## 2020-01-01 RX ADMIN — GABAPENTIN 400 MG: 400 CAPSULE ORAL at 21:55

## 2020-01-01 RX ADMIN — CARVEDILOL 25 MG: 25 TABLET, FILM COATED ORAL at 18:16

## 2020-01-01 RX ADMIN — HYDRALAZINE HYDROCHLORIDE 25 MG: 50 TABLET, FILM COATED ORAL at 07:00

## 2020-01-01 RX ADMIN — ROCURONIUM BROMIDE 10 MG: 10 INJECTION, SOLUTION INTRAVENOUS at 12:11

## 2020-01-01 RX ADMIN — METHADONE HYDROCHLORIDE 10 MG: 10 TABLET ORAL at 13:55

## 2020-01-01 RX ADMIN — DEXAMETHASONE 4 MG: 4 TABLET ORAL at 06:49

## 2020-01-01 RX ADMIN — DOCUSATE SODIUM 50 MG AND SENNOSIDES 8.6 MG 2 TABLET: 8.6; 5 TABLET, FILM COATED ORAL at 16:56

## 2020-01-01 RX ADMIN — POTASSIUM BICARBONATE 20 MEQ: 782 TABLET, EFFERVESCENT ORAL at 13:29

## 2020-01-01 RX ADMIN — OXYCODONE HYDROCHLORIDE 20 MG: 10 TABLET ORAL at 21:12

## 2020-01-01 RX ADMIN — GABAPENTIN 200 MG: 100 CAPSULE ORAL at 20:29

## 2020-01-01 RX ADMIN — DEXAMETHASONE SODIUM PHOSPHATE 10 MG: 10 INJECTION, SOLUTION INTRAMUSCULAR; INTRAVENOUS at 12:29

## 2020-01-01 RX ADMIN — LORAZEPAM 1 MG: 1 TABLET ORAL at 10:46

## 2020-01-01 RX ADMIN — HYDRALAZINE HYDROCHLORIDE 25 MG: 50 TABLET, FILM COATED ORAL at 06:49

## 2020-01-01 RX ADMIN — FUROSEMIDE 40 MG: 40 TABLET ORAL at 08:26

## 2020-01-01 RX ADMIN — ENOXAPARIN SODIUM 40 MG: 40 INJECTION SUBCUTANEOUS at 08:54

## 2020-01-01 RX ADMIN — DEXAMETHASONE 4 MG: 4 TABLET ORAL at 05:23

## 2020-01-01 RX ADMIN — DEXAMETHASONE 4 MG: 4 TABLET ORAL at 22:46

## 2020-01-01 RX ADMIN — SODIUM CHLORIDE: 9 INJECTION, SOLUTION INTRAVENOUS at 11:53

## 2020-01-01 RX ADMIN — FUROSEMIDE 20 MG: 20 TABLET ORAL at 08:55

## 2020-01-01 RX ADMIN — HYDRALAZINE HYDROCHLORIDE 25 MG: 50 TABLET, FILM COATED ORAL at 05:19

## 2020-01-01 RX ADMIN — METHADONE HYDROCHLORIDE 5 MG: 10 TABLET ORAL at 21:12

## 2020-01-01 RX ADMIN — DIVALPROEX SODIUM 125 MG: 125 CAPSULE, COATED PELLETS ORAL at 21:57

## 2020-01-01 RX ADMIN — FOLIC ACID 1 MG: 1 TABLET ORAL at 09:56

## 2020-01-01 RX ADMIN — DIVALPROEX SODIUM 125 MG: 125 CAPSULE, COATED PELLETS ORAL at 20:35

## 2020-01-01 RX ADMIN — METHADONE HYDROCHLORIDE 5 MG: 10 TABLET ORAL at 06:59

## 2020-01-01 RX ADMIN — HYDRALAZINE HYDROCHLORIDE 25 MG: 50 TABLET, FILM COATED ORAL at 06:11

## 2020-01-01 RX ADMIN — HYDRALAZINE HYDROCHLORIDE 25 MG: 50 TABLET, FILM COATED ORAL at 22:46

## 2020-01-01 RX ADMIN — MORPHINE SULFATE 4 MG: 4 INJECTION, SOLUTION INTRAMUSCULAR; INTRAVENOUS at 08:13

## 2020-01-01 RX ADMIN — FUROSEMIDE 20 MG: 20 TABLET ORAL at 09:47

## 2020-01-01 RX ADMIN — GABAPENTIN 400 MG: 400 CAPSULE ORAL at 09:47

## 2020-01-01 RX ADMIN — MORPHINE SULFATE 4 MG: 4 INJECTION, SOLUTION INTRAMUSCULAR; INTRAVENOUS at 00:55

## 2020-01-01 RX ADMIN — PANTOPRAZOLE SODIUM 40 MG: 40 TABLET, DELAYED RELEASE ORAL at 06:49

## 2020-01-01 RX ADMIN — CARVEDILOL 25 MG: 25 TABLET, FILM COATED ORAL at 10:36

## 2020-01-01 RX ADMIN — LORAZEPAM 1 MG: 1 TABLET ORAL at 16:12

## 2020-01-01 RX ADMIN — OXYCODONE HYDROCHLORIDE 20 MG: 10 TABLET ORAL at 00:48

## 2020-01-01 RX ADMIN — DOCUSATE SODIUM 100 MG: 100 CAPSULE ORAL at 10:31

## 2020-01-01 RX ADMIN — OXYCODONE HYDROCHLORIDE 20 MG: 10 TABLET ORAL at 17:29

## 2020-01-01 RX ADMIN — OXYCODONE HYDROCHLORIDE AND ACETAMINOPHEN 2 TABLET: 5; 325 TABLET ORAL at 12:33

## 2020-01-01 RX ADMIN — HYDRALAZINE HYDROCHLORIDE 25 MG: 50 TABLET, FILM COATED ORAL at 13:51

## 2020-01-01 RX ADMIN — GABAPENTIN 400 MG: 400 CAPSULE ORAL at 08:55

## 2020-01-01 RX ADMIN — METHADONE HYDROCHLORIDE 5 MG: 10 TABLET ORAL at 05:22

## 2020-01-01 RX ADMIN — METHADONE HYDROCHLORIDE 5 MG: 10 TABLET ORAL at 15:00

## 2020-01-01 RX ADMIN — GABAPENTIN 400 MG: 400 CAPSULE ORAL at 16:35

## 2020-01-01 RX ADMIN — PANTOPRAZOLE SODIUM 40 MG: 40 TABLET, DELAYED RELEASE ORAL at 06:06

## 2020-01-01 RX ADMIN — GABAPENTIN 400 MG: 400 CAPSULE ORAL at 21:24

## 2020-01-01 RX ADMIN — PANTOPRAZOLE SODIUM 40 MG: 40 TABLET, DELAYED RELEASE ORAL at 09:36

## 2020-01-01 RX ADMIN — MORPHINE SULFATE 2 MG: 2 INJECTION, SOLUTION INTRAMUSCULAR; INTRAVENOUS at 12:30

## 2020-01-01 RX ADMIN — GABAPENTIN 400 MG: 400 CAPSULE ORAL at 22:45

## 2020-01-01 RX ADMIN — ENOXAPARIN SODIUM 40 MG: 40 INJECTION SUBCUTANEOUS at 09:35

## 2020-01-01 RX ADMIN — MORPHINE SULFATE 4 MG: 4 INJECTION, SOLUTION INTRAMUSCULAR; INTRAVENOUS at 19:10

## 2020-01-01 RX ADMIN — CARVEDILOL 25 MG: 25 TABLET, FILM COATED ORAL at 15:47

## 2020-01-01 RX ADMIN — CARVEDILOL 25 MG: 25 TABLET, FILM COATED ORAL at 09:48

## 2020-01-01 RX ADMIN — DEXAMETHASONE 4 MG: 4 TABLET ORAL at 20:35

## 2020-01-01 RX ADMIN — HYDRALAZINE HYDROCHLORIDE 75 MG: 50 TABLET, FILM COATED ORAL at 21:47

## 2020-01-01 RX ADMIN — DEXAMETHASONE 4 MG: 4 TABLET ORAL at 06:15

## 2020-01-01 RX ADMIN — OXYCODONE HYDROCHLORIDE 5 MG: 5 TABLET ORAL at 23:10

## 2020-01-01 RX ADMIN — MORPHINE SULFATE 2 MG: 2 INJECTION, SOLUTION INTRAMUSCULAR; INTRAVENOUS at 13:47

## 2020-01-01 RX ADMIN — LORAZEPAM 1 MG: 1 TABLET ORAL at 22:50

## 2020-01-01 RX ADMIN — GABAPENTIN 400 MG: 400 CAPSULE ORAL at 13:48

## 2020-01-01 RX ADMIN — METHADONE HYDROCHLORIDE 10 MG: 10 TABLET ORAL at 14:21

## 2020-01-01 RX ADMIN — HYDRALAZINE HYDROCHLORIDE 25 MG: 50 TABLET, FILM COATED ORAL at 21:12

## 2020-01-01 RX ADMIN — METHADONE HYDROCHLORIDE 10 MG: 10 TABLET ORAL at 06:33

## 2020-01-01 RX ADMIN — ENOXAPARIN SODIUM 40 MG: 40 INJECTION SUBCUTANEOUS at 09:03

## 2020-01-01 RX ADMIN — LORAZEPAM 1 MG: 1 TABLET ORAL at 20:51

## 2020-01-01 RX ADMIN — DEXAMETHASONE 4 MG: 4 TABLET ORAL at 21:57

## 2020-01-01 RX ADMIN — LORAZEPAM 1 MG: 1 TABLET ORAL at 14:22

## 2020-01-01 RX ADMIN — Medication 3 MG: at 12:45

## 2020-01-01 RX ADMIN — HYDRALAZINE HYDROCHLORIDE 25 MG: 50 TABLET, FILM COATED ORAL at 20:36

## 2020-01-01 RX ADMIN — GABAPENTIN 400 MG: 400 CAPSULE ORAL at 10:46

## 2020-01-01 RX ADMIN — CARVEDILOL 25 MG: 25 TABLET, FILM COATED ORAL at 18:21

## 2020-01-01 RX ADMIN — GADOTERIDOL 19 ML: 279.3 INJECTION, SOLUTION INTRAVENOUS at 15:32

## 2020-01-01 RX ADMIN — DEXAMETHASONE 4 MG: 4 TABLET ORAL at 06:11

## 2020-01-01 RX ADMIN — Medication 3 MG: at 18:17

## 2020-01-01 RX ADMIN — Medication 1 KIT: at 15:20

## 2020-01-01 RX ADMIN — HYDRALAZINE HYDROCHLORIDE 25 MG: 50 TABLET, FILM COATED ORAL at 06:08

## 2020-01-01 RX ADMIN — METHADONE HYDROCHLORIDE 5 MG: 10 TABLET ORAL at 15:26

## 2020-01-01 RX ADMIN — METHADONE HYDROCHLORIDE 5 MG: 10 TABLET ORAL at 05:21

## 2020-01-01 RX ADMIN — DEXAMETHASONE 4 MG: 4 TABLET ORAL at 13:55

## 2020-01-01 RX ADMIN — MORPHINE SULFATE 2 MG: 2 INJECTION, SOLUTION INTRAMUSCULAR; INTRAVENOUS at 09:28

## 2020-01-01 RX ADMIN — FUROSEMIDE 40 MG: 40 TABLET ORAL at 09:03

## 2020-01-01 RX ADMIN — GABAPENTIN 400 MG: 400 CAPSULE ORAL at 21:02

## 2020-01-01 RX ADMIN — MIRTAZAPINE 15 MG: 15 TABLET, FILM COATED ORAL at 23:54

## 2020-01-01 RX ADMIN — GABAPENTIN 400 MG: 400 CAPSULE ORAL at 15:28

## 2020-01-01 RX ADMIN — HYDRALAZINE HYDROCHLORIDE 25 MG: 50 TABLET, FILM COATED ORAL at 23:25

## 2020-01-01 RX ADMIN — GABAPENTIN 400 MG: 400 CAPSULE ORAL at 09:39

## 2020-01-01 RX ADMIN — FOLIC ACID 1 MG: 1 TABLET ORAL at 08:55

## 2020-01-01 RX ADMIN — METHADONE HYDROCHLORIDE 5 MG: 10 TABLET ORAL at 15:55

## 2020-01-01 RX ADMIN — ACETAMINOPHEN 650 MG: 325 TABLET ORAL at 09:31

## 2020-01-01 RX ADMIN — METHADONE HYDROCHLORIDE 10 MG: 10 TABLET ORAL at 13:48

## 2020-01-01 RX ADMIN — METHADONE HYDROCHLORIDE 10 MG: 10 TABLET ORAL at 15:28

## 2020-01-01 RX ADMIN — OXYCODONE HYDROCHLORIDE 20 MG: 10 TABLET ORAL at 08:14

## 2020-01-01 RX ADMIN — FOLIC ACID 1 MG: 1 TABLET ORAL at 09:47

## 2020-01-01 RX ADMIN — FENTANYL CITRATE 50 MCG: 50 INJECTION, SOLUTION INTRAMUSCULAR; INTRAVENOUS at 14:48

## 2020-01-01 RX ADMIN — DEXAMETHASONE 4 MG: 4 TABLET ORAL at 15:28

## 2020-01-01 RX ADMIN — CARVEDILOL 25 MG: 25 TABLET, FILM COATED ORAL at 16:35

## 2020-01-01 RX ADMIN — DEXAMETHASONE SODIUM PHOSPHATE 10 MG: 4 INJECTION, SOLUTION INTRAMUSCULAR; INTRAVENOUS at 09:58

## 2020-01-01 RX ADMIN — CARVEDILOL 25 MG: 25 TABLET, FILM COATED ORAL at 08:55

## 2020-01-01 RX ADMIN — DOCUSATE SODIUM 50 MG AND SENNOSIDES 8.6 MG 2 TABLET: 8.6; 5 TABLET, FILM COATED ORAL at 17:23

## 2020-01-01 RX ADMIN — FUROSEMIDE 20 MG: 20 TABLET ORAL at 09:56

## 2020-01-01 RX ADMIN — OXYCODONE HYDROCHLORIDE 15 MG: 15 TABLET ORAL at 17:06

## 2020-01-01 RX ADMIN — MIRTAZAPINE 15 MG: 15 TABLET, FILM COATED ORAL at 22:45

## 2020-01-01 RX ADMIN — SODIUM CHLORIDE, PRESERVATIVE FREE 10 ML: 5 INJECTION INTRAVENOUS at 10:47

## 2020-01-01 RX ADMIN — POTASSIUM BICARBONATE 20 MEQ: 782 TABLET, EFFERVESCENT ORAL at 09:33

## 2020-01-01 RX ADMIN — ACETAMINOPHEN 650 MG: 325 TABLET ORAL at 09:32

## 2020-01-01 RX ADMIN — IOPAMIDOL 50 ML: 755 INJECTION, SOLUTION INTRAVENOUS at 22:58

## 2020-01-01 RX ADMIN — GADOBENATE DIMEGLUMINE 18 ML: 529 INJECTION, SOLUTION INTRAVENOUS at 07:42

## 2020-01-01 RX ADMIN — MIDAZOLAM 1 MG: 1 INJECTION INTRAMUSCULAR; INTRAVENOUS at 14:48

## 2020-01-01 RX ADMIN — OXYCODONE HYDROCHLORIDE 20 MG: 10 TABLET ORAL at 09:31

## 2020-01-01 RX ADMIN — HYDRALAZINE HYDROCHLORIDE 25 MG: 50 TABLET, FILM COATED ORAL at 17:01

## 2020-01-01 RX ADMIN — OXYCODONE HYDROCHLORIDE 20 MG: 10 TABLET ORAL at 15:28

## 2020-01-01 RX ADMIN — METHADONE HYDROCHLORIDE 10 MG: 10 TABLET ORAL at 20:35

## 2020-01-01 RX ADMIN — DEXAMETHASONE 4 MG: 4 TABLET ORAL at 13:31

## 2020-01-01 RX ADMIN — METHADONE HYDROCHLORIDE 10 MG: 10 TABLET ORAL at 22:45

## 2020-01-01 RX ADMIN — DEXAMETHASONE 4 MG: 4 TABLET ORAL at 07:00

## 2020-01-01 RX ADMIN — MIRTAZAPINE 15 MG: 15 TABLET, FILM COATED ORAL at 20:10

## 2020-01-01 RX ADMIN — DOCUSATE SODIUM 50 MG AND SENNOSIDES 8.6 MG 2 TABLET: 8.6; 5 TABLET, FILM COATED ORAL at 09:32

## 2020-01-01 RX ADMIN — ACETAMINOPHEN 650 MG: 325 TABLET ORAL at 20:10

## 2020-01-01 RX ADMIN — OXYCODONE HYDROCHLORIDE 20 MG: 10 TABLET ORAL at 10:38

## 2020-01-01 RX ADMIN — OXYCODONE HYDROCHLORIDE 15 MG: 15 TABLET ORAL at 15:47

## 2020-01-01 RX ADMIN — GABAPENTIN 200 MG: 100 CAPSULE ORAL at 09:32

## 2020-01-01 RX ADMIN — LORAZEPAM 1 MG: 1 TABLET ORAL at 11:42

## 2020-01-01 RX ADMIN — DEXAMETHASONE 4 MG: 4 TABLET ORAL at 17:08

## 2020-01-01 RX ADMIN — PANTOPRAZOLE SODIUM 40 MG: 40 TABLET, DELAYED RELEASE ORAL at 06:32

## 2020-01-01 RX ADMIN — FOLIC ACID 1 MG: 1 TABLET ORAL at 10:31

## 2020-01-01 RX ADMIN — DEXAMETHASONE 4 MG: 4 TABLET ORAL at 21:52

## 2020-01-01 ASSESSMENT — PULMONARY FUNCTION TESTS
PIF_VALUE: 26
PIF_VALUE: 38
PIF_VALUE: 3
PIF_VALUE: 1
PIF_VALUE: 21
PIF_VALUE: 23
PIF_VALUE: 1
PIF_VALUE: 25
PIF_VALUE: 27
PIF_VALUE: 25
PIF_VALUE: 25
PIF_VALUE: 5
PIF_VALUE: 1
PIF_VALUE: 26
PIF_VALUE: 3
PIF_VALUE: 25
PIF_VALUE: 25
PIF_VALUE: 4
PIF_VALUE: 1
PIF_VALUE: 25
PIF_VALUE: 1
PIF_VALUE: 0
PIF_VALUE: 29
PIF_VALUE: 3
PIF_VALUE: 38
PIF_VALUE: 22
PIF_VALUE: 14
PIF_VALUE: 23
PIF_VALUE: 3
PIF_VALUE: 23
PIF_VALUE: 26
PIF_VALUE: 1
PIF_VALUE: 2
PIF_VALUE: 1
PIF_VALUE: 26
PIF_VALUE: 25
PIF_VALUE: 25
PIF_VALUE: 26
PIF_VALUE: 25
PIF_VALUE: 25
PIF_VALUE: 3
PIF_VALUE: 1
PIF_VALUE: 1
PIF_VALUE: 25
PIF_VALUE: 5
PIF_VALUE: 23
PIF_VALUE: 25
PIF_VALUE: 1
PIF_VALUE: 25
PIF_VALUE: 33
PIF_VALUE: 26
PIF_VALUE: 23
PIF_VALUE: 1
PIF_VALUE: 20
PIF_VALUE: 26
PIF_VALUE: 26
PIF_VALUE: 1
PIF_VALUE: 25
PIF_VALUE: 1
PIF_VALUE: 25
PIF_VALUE: 25

## 2020-01-01 ASSESSMENT — PAIN DESCRIPTION - LOCATION
LOCATION: BACK
LOCATION: GENERALIZED
LOCATION: BACK
LOCATION: BACK
LOCATION: GENERALIZED
LOCATION: BACK
LOCATION: BACK;GENERALIZED
LOCATION: BACK
LOCATION: BACK;NECK

## 2020-01-01 ASSESSMENT — PAIN DESCRIPTION - PAIN TYPE
TYPE: ACUTE PAIN
TYPE: CHRONIC PAIN
TYPE: ACUTE PAIN
TYPE: ACUTE PAIN;CHRONIC PAIN
TYPE: ACUTE PAIN
TYPE: ACUTE PAIN;CHRONIC PAIN
TYPE: ACUTE PAIN
TYPE: ACUTE PAIN;CHRONIC PAIN
TYPE: ACUTE PAIN
TYPE: ACUTE PAIN;CHRONIC PAIN
TYPE: ACUTE PAIN
TYPE: ACUTE PAIN;CHRONIC PAIN
TYPE: ACUTE PAIN;CHRONIC PAIN
TYPE: ACUTE PAIN
TYPE: ACUTE PAIN;CHRONIC PAIN
TYPE: ACUTE PAIN
TYPE: CHRONIC PAIN

## 2020-01-01 ASSESSMENT — PAIN DESCRIPTION - DESCRIPTORS
DESCRIPTORS: ACHING;DISCOMFORT;SORE
DESCRIPTORS: CONSTANT;ACHING;DISCOMFORT
DESCRIPTORS: ACHING;BURNING;CONSTANT;DISCOMFORT
DESCRIPTORS: ACHING;CONSTANT;DISCOMFORT
DESCRIPTORS: ACHING;CONSTANT;DISCOMFORT;PENETRATING
DESCRIPTORS: ACHING;SHARP
DESCRIPTORS: ACHING;STABBING;SHARP
DESCRIPTORS: ACHING;CRUSHING;DISCOMFORT
DESCRIPTORS: ACHING;DISCOMFORT;SORE;CONSTANT
DESCRIPTORS: ACHING;CONSTANT;RADIATING
DESCRIPTORS: ACHING;SHARP;SHOOTING
DESCRIPTORS: ACHING;CONSTANT;DISCOMFORT
DESCRIPTORS: ACHING;SHARP;SHOOTING
DESCRIPTORS: ACHING;CONSTANT;DISCOMFORT
DESCRIPTORS: CONSTANT;DISCOMFORT;SHOOTING
DESCRIPTORS: ACHING;DISCOMFORT;SORE
DESCRIPTORS: ACHING;CONSTANT;DISCOMFORT
DESCRIPTORS: ACHING;CONSTANT
DESCRIPTORS: ACHING;DISCOMFORT
DESCRIPTORS: ACHING;CONSTANT
DESCRIPTORS: ACHING;CONSTANT;DISCOMFORT
DESCRIPTORS: ACHING;DISCOMFORT;CONSTANT
DESCRIPTORS: ACHING;CONSTANT;DISCOMFORT
DESCRIPTORS: PENETRATING
DESCRIPTORS: ACHING;CONSTANT;DISCOMFORT
DESCRIPTORS: ACHING;CONSTANT
DESCRIPTORS: ACHING;CONSTANT;DISCOMFORT
DESCRIPTORS: ACHING;DISCOMFORT;SORE
DESCRIPTORS: ACHING;DISCOMFORT;NAGGING
DESCRIPTORS: ACHING;DISCOMFORT;THROBBING
DESCRIPTORS: ACHING;DISCOMFORT;CONSTANT

## 2020-01-01 ASSESSMENT — PAIN SCALES - GENERAL
PAINLEVEL_OUTOF10: 10
PAINLEVEL_OUTOF10: 10
PAINLEVEL_OUTOF10: 8
PAINLEVEL_OUTOF10: 9
PAINLEVEL_OUTOF10: 10
PAINLEVEL_OUTOF10: 0
PAINLEVEL_OUTOF10: 0
PAINLEVEL_OUTOF10: 9
PAINLEVEL_OUTOF10: 0
PAINLEVEL_OUTOF10: 0
PAINLEVEL_OUTOF10: 10
PAINLEVEL_OUTOF10: 8
PAINLEVEL_OUTOF10: 10
PAINLEVEL_OUTOF10: 10
PAINLEVEL_OUTOF10: 7
PAINLEVEL_OUTOF10: 8
PAINLEVEL_OUTOF10: 10
PAINLEVEL_OUTOF10: 0
PAINLEVEL_OUTOF10: 10
PAINLEVEL_OUTOF10: 9
PAINLEVEL_OUTOF10: 3
PAINLEVEL_OUTOF10: 10
PAINLEVEL_OUTOF10: 10
PAINLEVEL_OUTOF10: 7
PAINLEVEL_OUTOF10: 0
PAINLEVEL_OUTOF10: 8
PAINLEVEL_OUTOF10: 0
PAINLEVEL_OUTOF10: 10
PAINLEVEL_OUTOF10: 10
PAINLEVEL_OUTOF10: 5
PAINLEVEL_OUTOF10: 8
PAINLEVEL_OUTOF10: 9
PAINLEVEL_OUTOF10: 7
PAINLEVEL_OUTOF10: 10
PAINLEVEL_OUTOF10: 9
PAINLEVEL_OUTOF10: 10
PAINLEVEL_OUTOF10: 10
PAINLEVEL_OUTOF10: 7
PAINLEVEL_OUTOF10: 3
PAINLEVEL_OUTOF10: 7
PAINLEVEL_OUTOF10: 5
PAINLEVEL_OUTOF10: 4
PAINLEVEL_OUTOF10: 8
PAINLEVEL_OUTOF10: 10
PAINLEVEL_OUTOF10: 7
PAINLEVEL_OUTOF10: 6
PAINLEVEL_OUTOF10: 9
PAINLEVEL_OUTOF10: 10
PAINLEVEL_OUTOF10: 0
PAINLEVEL_OUTOF10: 10
PAINLEVEL_OUTOF10: 7
PAINLEVEL_OUTOF10: 6
PAINLEVEL_OUTOF10: 8
PAINLEVEL_OUTOF10: 7
PAINLEVEL_OUTOF10: 10
PAINLEVEL_OUTOF10: 8
PAINLEVEL_OUTOF10: 10
PAINLEVEL_OUTOF10: 7
PAINLEVEL_OUTOF10: 0
PAINLEVEL_OUTOF10: 0
PAINLEVEL_OUTOF10: 9
PAINLEVEL_OUTOF10: 0
PAINLEVEL_OUTOF10: 0
PAINLEVEL_OUTOF10: 3
PAINLEVEL_OUTOF10: 0
PAINLEVEL_OUTOF10: 0
PAINLEVEL_OUTOF10: 10
PAINLEVEL_OUTOF10: 8
PAINLEVEL_OUTOF10: 10
PAINLEVEL_OUTOF10: 10
PAINLEVEL_OUTOF10: 0
PAINLEVEL_OUTOF10: 10
PAINLEVEL_OUTOF10: 8
PAINLEVEL_OUTOF10: 0
PAINLEVEL_OUTOF10: 10
PAINLEVEL_OUTOF10: 7
PAINLEVEL_OUTOF10: 5
PAINLEVEL_OUTOF10: 0
PAINLEVEL_OUTOF10: 8
PAINLEVEL_OUTOF10: 10
PAINLEVEL_OUTOF10: 6
PAINLEVEL_OUTOF10: 7
PAINLEVEL_OUTOF10: 10
PAINLEVEL_OUTOF10: 9
PAINLEVEL_OUTOF10: 8
PAINLEVEL_OUTOF10: 8
PAINLEVEL_OUTOF10: 10
PAINLEVEL_OUTOF10: 0
PAINLEVEL_OUTOF10: 10
PAINLEVEL_OUTOF10: 8
PAINLEVEL_OUTOF10: 8
PAINLEVEL_OUTOF10: 10
PAINLEVEL_OUTOF10: 5
PAINLEVEL_OUTOF10: 7
PAINLEVEL_OUTOF10: 10
PAINLEVEL_OUTOF10: 10
PAINLEVEL_OUTOF10: 0
PAINLEVEL_OUTOF10: 8
PAINLEVEL_OUTOF10: 9

## 2020-01-01 ASSESSMENT — PAIN DESCRIPTION - ORIENTATION
ORIENTATION: LOWER
ORIENTATION: LOWER
ORIENTATION: MID;LOWER
ORIENTATION: LOWER
ORIENTATION: RIGHT;LOWER
ORIENTATION: LOWER
ORIENTATION: LOWER;POSTERIOR
ORIENTATION: LOWER
ORIENTATION: LOWER
ORIENTATION: LOWER;MID
ORIENTATION: LOWER
ORIENTATION: LOWER;MID
ORIENTATION: LOWER
ORIENTATION: RIGHT;LOWER
ORIENTATION: LOWER
ORIENTATION: LOWER

## 2020-01-01 ASSESSMENT — PAIN DESCRIPTION - ONSET
ONSET: ON-GOING

## 2020-01-01 ASSESSMENT — PAIN DESCRIPTION - PROGRESSION
CLINICAL_PROGRESSION: NOT CHANGED
CLINICAL_PROGRESSION: GRADUALLY IMPROVING
CLINICAL_PROGRESSION: GRADUALLY IMPROVING
CLINICAL_PROGRESSION: NOT CHANGED
CLINICAL_PROGRESSION: NOT CHANGED
CLINICAL_PROGRESSION: GRADUALLY IMPROVING
CLINICAL_PROGRESSION: NOT CHANGED
CLINICAL_PROGRESSION: GRADUALLY IMPROVING
CLINICAL_PROGRESSION: NOT CHANGED
CLINICAL_PROGRESSION: GRADUALLY IMPROVING
CLINICAL_PROGRESSION: GRADUALLY IMPROVING
CLINICAL_PROGRESSION: NOT CHANGED

## 2020-01-01 ASSESSMENT — PAIN - FUNCTIONAL ASSESSMENT
PAIN_FUNCTIONAL_ASSESSMENT: PREVENTS OR INTERFERES SOME ACTIVE ACTIVITIES AND ADLS
PAIN_FUNCTIONAL_ASSESSMENT: PREVENTS OR INTERFERES WITH MANY ACTIVE NOT PASSIVE ACTIVITIES
PAIN_FUNCTIONAL_ASSESSMENT: PREVENTS OR INTERFERES SOME ACTIVE ACTIVITIES AND ADLS

## 2020-01-01 ASSESSMENT — LIFESTYLE VARIABLES: SMOKING_STATUS: 1

## 2020-01-01 ASSESSMENT — PAIN DESCRIPTION - FREQUENCY
FREQUENCY: CONTINUOUS
FREQUENCY: INTERMITTENT
FREQUENCY: CONTINUOUS

## 2020-01-01 ASSESSMENT — ENCOUNTER SYMPTOMS
NAUSEA: 0
BLOOD IN STOOL: 0
CONSTIPATION: 0
PHOTOPHOBIA: 0
DIARRHEA: 0
VOMITING: 0
SHORTNESS OF BREATH: 1
SHORTNESS OF BREATH: 1
ABDOMINAL PAIN: 1
BACK PAIN: 1
EYE PAIN: 0
EYE DISCHARGE: 0

## 2020-01-01 ASSESSMENT — PAIN DESCRIPTION - DIRECTION
RADIATING_TOWARDS: LEGS
RADIATING_TOWARDS: BACK

## 2020-01-01 ASSESSMENT — PAIN SCALES - WONG BAKER: WONGBAKER_NUMERICALRESPONSE: 2

## 2020-11-03 PROBLEM — N17.9 ACUTE KIDNEY INJURY (HCC): Status: RESOLVED | Noted: 2017-07-14 | Resolved: 2020-01-01

## 2020-12-04 NOTE — ED PROVIDER NOTES
[facility]   ED  Encounter Note  Admit Date/RoomTime: 2020 11:49 AM  ED Room: 35/35    NAME: Rosy Odom  : 1970  MRN: [id number]     Chief Complaint:  Back Pain (radiates down right leg pain)    History of Present Illness       Rosy Odom is a 44 y.o. old male with a prior history of recurrent intermittent self limited episodes of low back pain, presents to the emergency department by private vehicle, for traumatic acute, aching, burning and stiffness bilateral lower lumbar spine pain with radiation, to both knees, for 5-6 week(s) prior to arrival.  There has been a new injury as it relates to today's presenting complaint when he was mopping and felt something pull/pop in his low bakc. Since onset the symptoms have been stable and is moderate-to-severe. He has associated signs & symptoms of bilateral sciatica worse on the right. He denies any bladder or bowel incontinence , new weakness, tingling or paresthesias, recent back injection, recent spinal surgery, history of IVDA, fever, abdominal pain, bladder incontinence, bowel incontinence, bladder retention, bladder urgency, bowel urgency, saddle paresthesias  or sacral numbness. The pain is aggraveated by any movement and relieved by standing. He is not currently enrolled in an pain management program or managed by PCP or back specialist.      ROS   Pertinent positives and negatives are stated within HPI, all other systems reviewed and are negative. Past Medical History:  has a past medical history of Acute kidney injury ([address] Cocaine abuse ([facility] [address] Heroin abuse ([facility] [address] Hyperkalemia, Hypertension, Liver disease, Non-traumatic rhabdomyolysis, and Psychiatric problem. Surgical History:  has a past surgical history that includes hernia repair. Social History:  reports that he has been smoking cigarettes. He has been smoking about 1.50 packs per day.  He has never used smokeless tobacco. He reports current alcohol use of about 72.0 standard drinks of alcohol per week. He reports current drug use. Drugs: IV and Cocaine. Family History: family history includes Coronary Art Dis in his father; Depression in his mother; High Blood Pressure in his father and mother; Mental Illness in his sister; No Known Problems in his brother, brother, and sister; Other in his mother. Allergies: Patient has no known allergies. Physical Exam   Oxygen Saturation Interpretation: Normal.        ED Triage Vitals [12/04/20 1150]   BP Temp Temp src Pulse Resp SpO2 Height Weight   139/78 97.1 °F (36.2 °C) -- 127 16 98 % 5' 7\" (1.702 m) 212 lb (96.2 kg)         Constitutional:  Alert, development consistent with age. HEENT:  NC/NT. Airway patent. Neck:  Normal ROM. Supple. Respiratory:  Clear to auscultation and breath sounds equal.  CV:  Regular rate and rhythm, normal heart sounds, without pathological murmurs, ectopy, gallops, or rubs. GI:  Abdomen Soft, nontender, good bowel sounds. No firm or pulsatile mass. Back:   Lower lumbar bilaterally and worse on the right. Tenderness: Severe. Swelling: no.              Range of Motion: diminished range with pain. Straight leg raising:  Bilateral negative. Skin:  no erythema, rash or swelling noted. Distal Function:              Motor deficit: none. Sensory deficit: none. Pulse deficit: none. Calf Tenderness:  No Bilateral.               Edema:  none Both lower extremity(s). Deep Tendon Reflexes (DTR's) of Bilateral Knee/Patellar reflex hyporeflexic. Integument:  Normal turgor. Warm, dry, without visible rash. Lymphatics: No lymphangitis or adenopathy noted. Neurological:  Oriented. Motor functions intact.     Lab / Imaging Results   (All laboratory and radiology results have been personally reviewed by myself)  Labs:  No results found for this visit on 12/04/20. Imaging: All Radiology results interpreted by Radiologist unless otherwise noted. No orders to display       ED Course / Medical Decision Making     Medications   oxyCODONE-acetaminophen (PERCOCET) 5-325 MG per tablet 2 tablet (2 tablets Oral Given 12/4/20 1233)   ketorolac (TORADOL) injection 15 mg (30 mg Intravenous Given 12/4/20 1230)   orphenadrine (NORFLEX) extended release tablet 100 mg (100 mg Oral Given 12/4/20 1234)   dexamethasone (PF) (DECADRON) injection 10 mg (10 mg Intramuscular Given 12/4/20 1229)        Re-examination:  12/4/20       Time: 1330   Patients condition has markedly improved. Vital signs reviewed, pulse now normal.    Consult(s):   None    Procedure(s):   none    Medical Decision Making:    Patient presents to the emergency department for low back pain. Patient recently had occasions above with significant improvement of his symptoms. Patient was noted to be tachycardic upon presentation to the emergency department, associated with pain. This improved with the medications above. Will be provided with the prescriptions below and should follow-up with the [facility] internal medicine clinic since he does not have a primary care provider. Specific conditions for emergent return have been discussed and the patient verbalized understanding to return immediately for new or worsening symptoms. Plan of Care/Counseling:  I reviewed today's visit with the patient in addition to providing specific details for the plan of care and counseling regarding the diagnosis and prognosis. Questions are answered at this time and are agreeable with the plan. Assessment      1.  Low back pain, unspecified back pain laterality, unspecified chronicity, unspecified whether sciatica present      Plan   Discharge to home  Patient condition is good    New Medications     New Prescriptions    ACETAMINOPHEN (TYLENOL) 500 MG TABLET    Take 2 tablets by mouth 3 times daily for 7 days CYCLOBENZAPRINE (FLEXERIL) 10 MG TABLET    Take 1 tablet by mouth 3 times daily as needed for Muscle spasms    LIDOCAINE (LIDODERM) 5 %    Place 1 patch onto the skin every 24 hours for 10 days 12 hours on, 12 hours off. NAPROXEN (NAPROSYN) 500 MG TABLET    Take 1 tablet by mouth 2 times daily for 7 days    PREDNISONE (DELTASONE) 10 MG TABLET    Take 4 tablets by mouth daily for 5 days     Electronically signed by Keaton Romeo PA-C   DD: 12/4/20  **This report was transcribed using voice recognition software. Every effort was made to ensure accuracy; however, inadvertent computerized transcription errors may be present.   END OF ED PROVIDER NOTE        Keaton Romeo PA-C  12/04/20 1795

## 2020-12-13 PROBLEM — M84.48XA PATHOLOGIC LUMBAR VERTEBRAL FRACTURE, INITIAL ENCOUNTER: Status: ACTIVE | Noted: 2020-01-01

## 2020-12-13 NOTE — PROGRESS NOTES
Patient seen and examined  Has disseminated CA with multiple mets, including T8 and L3 vertebral body masses/pathologic fractures  Will order TLSO brace   Very poor prognosis  No neurosurgical intervention planned  Likely needs bronchoscopy for biopsy,radiation oncology and hematology consults

## 2020-12-13 NOTE — H&P
Lori Allen PA-C   gabapentin (NEURONTIN) 100 MG capsule Take 2 capsules by mouth 3 times daily 8/2/17   Clive Sandoval MD   mirtazapine (REMERON) 15 MG tablet Take 1 tablet by mouth nightly 8/2/17   Clive Sandoval MD   carvedilol (COREG) 25 MG tablet Take 1 tablet by mouth 2 times daily (with meals) 8/2/17   Edwin Giron DO   hydrALAZINE (APRESOLINE) 25 MG tablet Take 3 tablets by mouth every 8 hours 8/2/17 9/1/17  Edwin Giron, DO   amLODIPine (NORVASC) 10 MG tablet Take 1 tablet by mouth daily 8/2/17   Edwin Giron, DO   furosemide (LASIX) 40 MG tablet Take 1 tablet by mouth daily 8/2/17   Edwin Giron, DO   folic acid (FOLVITE) 1 MG tablet Take 1 tablet by mouth daily 8/2/17   Edwin Giron,    pantoprazole (PROTONIX) 40 MG tablet Take 1 tablet by mouth every morning (before breakfast) 8/2/17   Edwin Giron,    vitamin B-1 100 MG tablet Take 1 tablet by mouth daily 8/2/17   Edwin Giron DO       Allergies:  Patient has no known allergies. Social History:      The patient currently lives independently    TOBACCO:   reports that he has been smoking cigarettes. He has been smoking about 1.50 packs per day. He has never used smokeless tobacco.  ETOH:   reports current alcohol use of about 72.0 standard drinks of alcohol per week. Family History:      Reviewed in detail and negative for DM, CAD, Cancer, CVA. Positive as follows:        Problem Relation Age of Onset    Other Mother     Depression Mother     High Blood Pressure Mother     Coronary Art Dis Father     High Blood Pressure Father     Mental Illness Sister     No Known Problems Brother     No Known Problems Brother     No Known Problems Sister        REVIEW OF SYSTEMS:   Pertinent positives as noted in the HPI. All other systems reviewed and negative.     PHYSICAL EXAM:    /76   Pulse 108   Temp 97.8 °F (36.6 °C) (Temporal)   Resp 18   SpO2 96%     General appearance:  No apparent distress, appears stated age and cooperative. HEENT:  Normal cephalic, atraumatic without obvious deformity. Pupils equal, round, and reactive to light. Extra ocular muscles intact. Conjunctivae/corneas clear. Neck: Supple, with full range of motion. No jugular venous distention. Trachea midline. Respiratory:  Normal respiratory effort. Clear to auscultation, bilaterally without Rales/Wheezes/Rhonchi. Cardiovascular:  Regular rate and rhythm with normal S1/S2 without murmurs, rubs or gallops. Abdomen: Soft, non-tender, non-distended with normal bowel sounds. Musculoskeletal: Positive pain to range of motion of lumbar and thoracic spine skin: Skin color, texture, turgor normal.  No rashes or lesions. Neurologic:  Neurovascularly intact without any focal sensory/motor deficits. Cranial nerves: II-XII intact, grossly non-focal.  Psychiatric:  Alert and oriented, thought content appropriate, normal insight  Capillary Refill: Brisk,< 3 seconds   Peripheral Pulses: +2 palpable, equal bilaterally   No radiation information found for this patient   Narrative   EXAMINATION:   CT OF THE CHEST WITH CONTRAST 12/12/2020 9:36 pm   TECHNIQUE:   CT of the chest was performed with the administration of intravenous   contrast. Multiplanar reformatted images are provided for review. Dose   modulation, iterative reconstruction, and/or weight based adjustment of the   mA/kV was utilized to reduce the radiation dose to as low as reasonably   achievable. COMPARISON:   None. HISTORY:   ORDERING SYSTEM PROVIDED HISTORY: Possible malignancy   TECHNOLOGIST PROVIDED HISTORY:   Reason for exam:->Possible malignancy   What reading provider will be dictating this exam?->CRC   FINDINGS:   Mediastinum: Large heterogeneous left hilar mass measuring approximately 5.4   x 4.1 x 3.7 cm consistent with malignancy. There is occlusion of left upper lobe bronchus and severe bronchial narrowing   to the left lower lobe.    Associated mediastinal adenopathy in BONES/ALIGNMENT: There are lytic destructive changes in the L3 vertebral   body, more so on the right.  Lytic lesion extends to the right pedicle,   transverse process and superior articular facet.  There is pathological   fracture involving the right lateral aspect of the L3 vertebral body, with   approximately 60% loss of height.  Soft tissue fullness extends into the   adjacent psoas muscle.  The extent of epidural involvement is unclear on this   study. DEGENERATIVE CHANGES:   L1-2: Unremarkable. L2-3: Small disc bulge.  Mild facet hypertrophy.  Mild central canal, lateral   recess and neural foraminal stenoses. L3-4: Destructive changes in the right L3 vertebral body as described. Extent of epidural involvement and therefore the extent of central canal   stenosis is not well delineated on this study.  The lesion likely extends   into the right neural foramina.  Moderate stenosis of the left neural   foramina. L4-5: Prominent loss of disc height with small disc bulge.  Mild facet and   ligamentous hypertrophy.  Mild-to-moderate central canal and lateral recess   stenoses.  Moderate neural foraminal stenoses. L5-S1: Minimal disc bulge.  Mild facet hypertrophy.  No significant central   canal or lateral recess stenosis.  Mild to moderate neural foraminal stenoses. SOFT TISSUES/RETROPERITONEUM: Soft tissue fullness in the right psoas muscle,   extending from the L3 level inferiorly.       Impression   1. Lytic destructive lesion along the right lateral aspect of the L3   vertebral body, involving the right pedicle, transverse process and superior   articular facet. 2. Pathological fracture on the right with loss of height by approximately   60%.  The lesion may represent malignancy or an infectious process. Destructive changes in the discs characteristic of discitis/osteomyelitis are   not evident on this study.  Further evaluation with pre and post contrast   enhanced MRI is recommended.    3. Soft tissue fullness extends from the L3 vertebral body into the adjacent   psoas muscle, which is landis than the contralateral side.  The soft tissue   component may represent malignancy or infection. 4. Extent of epidural involvement of disease at L3 is not well delineated on   this study.  Pre and post contrast enhanced MRI should clarify. 5. Variable degrees of degenerative changes, as described. Labs:     No results for input(s): WBC, HGB, HCT, PLT in the last 72 hours. Recent Labs     12/12/20 2033      K 4.2   CL 99   CO2 27   BUN 20   CREATININE 1.2   CALCIUM 10.8*     No results for input(s): AST, ALT, BILIDIR, BILITOT, ALKPHOS in the last 72 hours. No results for input(s): INR in the last 72 hours. No results for input(s): Onetha Traci in the last 72 hours. Urinalysis:      Lab Results   Component Value Date    NITRU Negative 12/12/2020    WBCUA 0-1 12/12/2020    BACTERIA MANY 12/12/2020    RBCUA NONE 12/12/2020    BLOODU Negative 12/12/2020    SPECGRAV 1.015 12/12/2020    GLUCOSEU Negative 12/12/2020         ASSESSMENT:    Active Hospital Problems    Diagnosis Date Noted    Pathologic lumbar vertebral fracture, initial encounter [facility] 12/13/2020   Lung mass    PLAN:  We will order MRI of lumbar and thoracic spine. Consultation with neurosurgery and oncology  Pain control  DVT Prophylaxis: SCDs  Diet: No diet orders on file  Code Status: Prior    PT/OT Eval Status: N/A    Dispo -goal home       Francisco Sawyer DO    Thank you No primary care provider on file. for the opportunity to be involved in this patient's care. If you have any questions or concerns please feel free to contact me at [phone].

## 2020-12-13 NOTE — CONSULTS
Palliative Care Department  [phone]  Palliative Care Initial Consult  Provider North Velasquez PA-C    Sonja Currie  [id number]  Hospital Day: 2    Referring Provider: Moira Bosworth, MD  Palliative Medicine was consulted for assistance with: Symptom management, goals of care, CODE STATUS and family support    CHIEF COMPLAINT: Back pain    ASSESSMENT/PLAN:   Pertinent hospital diagnoses:  1. Metastatic cancer- unknown primary areas involved bone, pancreas, lung, liver, lymph nodes  - workup    2. Pain associated with neoplasm  - PCA morphine 1 mg/hour and 2 mg every 20 mins PRN  - dexamethasone 12 mg IV x 1 now then 10 mg IV in am x 3 days  -protonix IV prophylaxis  -  EKG screening for possible use of methadone    3. GI  - senna-s 2 tabs bid  - miralax 17 gm daily (make PRN if loose BM)    PALLIATIVE CARE ENCOUNTER  - Capacity: At this time, Sonja Currie, Does have capacity for medical decision-making. Capacity is time limited and situation/question specific  - Outcome of goals of care meeting: symptom management  - Code Status:   full  - Advanced directives:   - Surrogate decision maker/Legal NOK: 2 children    Contacts: Friend Jose Haywood [phone]    - Spiritual assessment: to be determined  - Bereavement and grief: grief issues, young age    - DISPO: workup for cancer, pain control    Referrals to: none today    HPI:   Sonja Currie is a 44 y.o. with a past medical history of hypertension, depressionm drug overdose, opiate and alcohol addiction who presented to the emergency department from home with worsening back pain. Patient completed workup in the ED and was admitted on 12/12/2020 with diagnosis(ses) of malignant neoplasm metastatic to bone, lung mass, pathologic fracture of vertebrae due to neoplastic disease. Significant abnormalities identified on CT of the lumbar spine: Destructive lytic lesions within L3 vertebrae, soft tissue mass extending into the psoas muscle.   MRI recommended/pending. CT of abdomen and pelvis identified a mass in the head of the pancreas 2.4 x 3 x 3.2 cm consistent with malignancy, additional lytic lesions identified in T8, T12, left femoral neck. Hypodense lesions in right lobe of liver consistent with metastatic disease as well. CT of the chest with significant abnormalities, left hilar mass 5.4 x 4.1 x 3.7 cm with occlusion of left upper lobe bronchus and narrowing of left lower lobe. Adenopathy additionally noted. Urine drug screen positive for opiates on admission and OARRS reviewed with not opiates prescribed. .  Neurosurgery consulted and advised TLSO brace without surgical intervention planned. Current medication use for pain includes:   Neurontin 200 mg 3 times a day   Flexeril 10 mg 3 times a day as needed   Morphine 2 mg IV every 4 hours as needed for severe pain     Details of Conversation:  Palliative medicine introduced. Pain mid -lower back associated with movement which causes burn/shocklike pain rachelle right leg to kneecap, alleviated with certain positions, exacerbated greatly when coughing  80/10 at worst, morphine IV works for 15-20 minutes then wears off    No BM x 2 days    No associated N/V, SOB, H/A, vision change, difficulty swallowing    Patient interested in outpatient Palliative medicine followup for symptom management. We will follow and adjust medications in preparation for discharge and outpatient followup. Denies current drug use. Admits to alcohol use, 1-2 drinks.     Past Medical History:   Diagnosis Date    Acute kidney injury ([address] 7/14/2017    Cocaine abuse ([facility] [address] 7/14/2017    Heroin abuse ([facility] [address] 7/14/2017    Hyperkalemia 7/14/2017    Hypertension     Liver disease     Non-traumatic rhabdomyolysis 7/14/2017    Psychiatric problem      Past Surgical History:   Procedure Laterality Date    HERNIA REPAIR       Inpatient medications reviewed: yes  Home Medications reviewed: yes    No Known Allergies    Family effort has been made to ensure accuracy; however, inadvertent computerized transcription errors may be present.

## 2020-12-13 NOTE — PLAN OF CARE
Problem: Falls - Risk of:  Goal: Will remain free from falls  Description: Will remain free from falls  Outcome: Met This Shift  Goal: Absence of physical injury  Description: Absence of physical injury  Outcome: Met This Shift     Problem: Pain:  Goal: Pain level will decrease  Description: Pain level will decrease  Outcome: Not Met This Shift  Goal: Control of acute pain  Description: Control of acute pain  Outcome: Not Met This Shift

## 2020-12-13 NOTE — ED PROVIDER NOTES
[facility]  Department of Emergency Medicine   ED  Encounter Note  Admit Date/RoomTime: 2020  6:23 PM  ED Room: 5402/5402-A    NAME: Helga Rees  : 1970  MRN: [id number]     Chief Complaint:  Back Pain (low back, chronic, states he just got our of [facility] and they found arthritis and other degenerative changes in back, pt refuaing to sit due to pain )    History of Present Illness        Helga Rees is a 44 y.o. old male with a prior history of degenerative disc disease presents to the ED for evaluation of low back pain. He states that it has been worsening over the past 6 weeks or so. He recently got out of [facility] and just moved back to the area. He currently does not have a family doctor, pain management specialist or spinal surgeon. He has been taking naproxen without much relief. He has worsening pain in the right lower back radiating down his buttocks and posterior thigh. He denies any bowel or bladder incontinence or retention. He has no fever or chills. He denies any recent falls or trauma. He has no history of prostate issues and denies any abdominal pain, hematuria or kidney stone history. He was a prior IV drug user but last used 4 years ago before he was incarcerated. .  ROS   Pertinent positives and negatives are stated within HPI, all other systems reviewed and are negative. Past Medical History:  has a past medical history of Acute kidney injury ([facility] [address] Cocaine abuse ([facility] [address] Heroin abuse ([facility] [address] Hyperkalemia, Hypertension, Liver disease, Non-traumatic rhabdomyolysis, and Psychiatric problem. Surgical History:  has a past surgical history that includes hernia repair. Social History:  reports that he has been smoking cigarettes. He has been smoking about 1.50 packs per day. He has never used smokeless tobacco. He reports current alcohol use of about 72.0 standard drinks of alcohol per week. He reports current drug use.  Drugs: IV and Cocaine. Family History: family history includes Coronary Art Dis in his father; Depression in his mother; High Blood Pressure in his father and mother; Mental Illness in his sister; No Known Problems in his brother, brother, and sister; Other in his mother. Allergies: Patient has no known allergies. Physical Exam   Oxygen Saturation Interpretation: Normal.        ED Triage Vitals [12/12/20 1810]   BP Temp Temp Source Pulse Resp SpO2 Height Weight   97/79 98.6 °F (37 °C) Tympanic 157 18 (!) 1 % -- --         Constitutional:  Alert, development consistent with age. HEENT:  NC/NT. Airway patent. Neck:  Normal ROM. Supple. Respiratory:  Clear to auscultation and breath sounds equal.  CV:  Regular rate and rhythm, normal heart sounds, without pathological murmurs, ectopy, gallops, or rubs. GI:  Abdomen Soft, nontender, good bowel sounds. No firm or pulsatile mass. Back: lower lumbar spine right greater than left. Tenderness: Moderate. Swelling: no.              Range of Motion: diminished range with pain. CVA Tenderness: No.            Straight leg raising:  Right positive at 30 degrees. Skin:  no erythema, rash or swelling noted. Distal Function:              Motor deficit: none. Sensory deficit: none. Pulse deficit: none. Calf Tenderness:  No Bilateral.               Edema:  none Both lower extremity(s). Integument:  Normal turgor. Warm, dry, without visible rash. Lymphatics: No lymphangitis or adenopathy noted. Neurological:  Oriented. Motor functions intact.     Lab / Imaging Results   (All laboratory and radiology results have been personally reviewed by myself)  Labs:  Results for orders placed or performed during the hospital encounter of 12/12/20   Lactic Acid, Plasma   Result Value Ref Range    Lactic Acid 1.2 0.5 - 2.2 mmol/L   C-REACTIVE PROTEIN   Result Value Ref Range    CRP 4.6 (H) 0.0 - 0.4 mg/dL SEDIMENTATION RATE   Result Value Ref Range    Sed Rate 12 0 - 15 mm/Hr   Urinalysis   Result Value Ref Range    Color, UA Yellow Straw/Yellow    Clarity, UA SL CLOUDY Clear    Glucose, Ur Negative Negative mg/dL    Bilirubin Urine Negative Negative    Ketones, Urine Negative Negative mg/dL    Specific Gravity, UA 1.015 1.005 - 1.030    Blood, Urine Negative Negative    pH, UA 7.0 5.0 - 9.0    Protein, UA Negative Negative mg/dL    Urobilinogen, Urine 1.0 <2.0 E.U./dL    Nitrite, Urine Negative Negative    Leukocyte Esterase, Urine TRACE (A) Negative   Urine Drug Screen   Result Value Ref Range    Amphetamine Screen, Urine NOT DETECTED Negative <1000 ng/mL    Barbiturate Screen, Ur NOT DETECTED Negative < 200 ng/mL    Benzodiazepine Screen, Urine NOT DETECTED Negative < 200 ng/mL    Cannabinoid Scrn, Ur NOT DETECTED Negative < 50ng/mL    Cocaine Metabolite Screen, Urine NOT DETECTED Negative < 300 ng/mL    Opiate Scrn, Ur POSITIVE (A) Negative < 300ng/mL    PCP Screen, Urine NOT DETECTED Negative < 25 ng/mL    Methadone Screen, Urine NOT DETECTED Negative <300 ng/mL    Oxycodone Urine NOT DETECTED Negative <100 ng/mL    FENTANYL SCREEN, URINE NOT DETECTED Negative <1 ng/mL    Drug Screen Comment: see below    Serum Drug Screen   Result Value Ref Range    Ethanol Lvl <10 mg/dL    Acetaminophen Level <5.0 (L) 10.0 - 62.0 mcg/mL    Salicylate, Serum <0.5 0.0 - 30.0 mg/dL    TCA Scrn NEGATIVE Cutoff:300 ng/mL   Basic Metabolic Panel w/ Reflex to MG   Result Value Ref Range    Sodium 136 132 - 146 mmol/L    Potassium reflex Magnesium 4.2 3.5 - 5.0 mmol/L    Chloride 99 98 - 107 mmol/L    CO2 27 22 - 29 mmol/L    Anion Gap 10 7 - 16 mmol/L    Glucose 115 (H) 74 - 99 mg/dL    BUN 20 6 - 20 mg/dL    CREATININE 1.2 0.7 - 1.2 mg/dL    GFR Non-African American >60 >=60 mL/min/1.73    GFR African American >60     Calcium 10.8 (H) 8.6 - 10.2 mg/dL   COVID-19   Result Value Ref Range    SARS-CoV-2, NAAT Not Detected Not Detected   Microscopic Urinalysis   Result Value Ref Range    WBC, UA 0-1 0 - 5 /HPF    RBC, UA NONE 0 - 2 /HPF    Epithelial Cells, UA RARE /HPF    Bacteria, UA MANY (A) None Seen /HPF    Amorphous, UA MODERATE    CBC WITH AUTO DIFFERENTIAL   Result Value Ref Range    WBC 14.4 (H) 4.5 - 11.5 E9/L    RBC 4.68 3.80 - 5.80 E12/L    Hemoglobin 14.6 12.5 - 16.5 g/dL    Hematocrit 42.8 37.0 - 54.0 %    MCV 91.5 80.0 - 99.9 fL    MCH 31.2 26.0 - 35.0 pg    MCHC 34.1 32.0 - 34.5 %    RDW 13.0 11.5 - 15.0 fL    Platelets 422 343 - 372 E9/L    MPV 9.1 7.0 - 12.0 fL    Neutrophils % 74.1 43.0 - 80.0 %    Immature Granulocytes % 1.0 0.0 - 5.0 %    Lymphocytes % 12.2 (L) 20.0 - 42.0 %    Monocytes % 10.8 2.0 - 12.0 %    Eosinophils % 1.5 0.0 - 6.0 %    Basophils % 0.4 0.0 - 2.0 %    Neutrophils Absolute 10.69 (H) 1.80 - 7.30 E9/L    Immature Granulocytes # 0.14 E9/L    Lymphocytes Absolute 1.76 1.50 - 4.00 E9/L    Monocytes Absolute 1.56 (H) 0.10 - 0.95 E9/L    Eosinophils Absolute 0.22 0.05 - 0.50 E9/L    Basophils Absolute 0.06 0.00 - 0.20 E9/L    Poikilocytes 1+     Bryce Cells 1+    Comprehensive Metabolic Panel w/ Reflex to MG   Result Value Ref Range    Sodium 136 132 - 146 mmol/L    Potassium reflex Magnesium 4.0 3.5 - 5.0 mmol/L    Chloride 100 98 - 107 mmol/L    CO2 22 22 - 29 mmol/L    Anion Gap 14 7 - 16 mmol/L    Glucose 85 74 - 99 mg/dL    BUN 21 (H) 6 - 20 mg/dL    CREATININE 1.0 0.7 - 1.2 mg/dL    GFR Non-African American >60 >=60 mL/min/1.73    GFR African American >60     Calcium 10.6 (H) 8.6 - 10.2 mg/dL    Total Protein 6.9 6.4 - 8.3 g/dL    Alb 3.8 3.5 - 5.2 g/dL    Total Bilirubin 0.7 0.0 - 1.2 mg/dL    Alkaline Phosphatase 71 40 - 129 U/L    ALT 24 0 - 40 U/L    AST 14 0 - 39 U/L       Imaging: All Radiology results interpreted by Radiologist unless otherwise noted.   CT ABDOMEN PELVIS W IV CONTRAST Additional Contrast? None   Final Result   Hypodense lesions in the right lobe of the liver, the larger measuring 3.5 cm   in diameter and was consistent with metastatic disease. Mildly heterogeneous mass extending cephalad from the head of the pancreas,   most suspicious for pancreatic malignancy measuring approximately 2.4 x 3 x   3.2 cm in diameter. Multiple lytic lesions as described involving the T8 vertebral body, T12   vertebral body, L3 vertebral body, and proximal left femur. Pathologic compression fracture of L3 and extensive extension of malignant   process to the paraspinal soft tissues anteriorly and to the right of L3, to   the adjacent intervertebral discs, and spinal canal.  Extension into the   right pedicle and right articular facet and large component extending to the   right iliopsoas muscle which is asymmetrically enlarged. Hypodense process   extending caudally along the course of the right psoas muscle with mild   peripheral enhancement, likely malignant involvement versus associated   abscess, felt to be a less likely consideration. Intraspinal extension of malignant process at the level of T8, and L3.      CT CHEST W CONTRAST   Final Result   Large heterogeneous left hilar mass as described consistent with malignancy. Occlusion of left upper lobe bronchus and severe bronchial narrowing to the   left lower lobe. Mildly spiculated nodular lesion, pleural based in the left lung apex likely   satellite malignant lesion measuring 1.5 cm. Mediastinal adenopathy likely manifesting malignant involvement. Large metastatic lesion to the T8 vertebral body with paraspinal an   intraspinal extension of malignancy and pathologic compression fracture of T8.      CT LUMBAR SPINE WO CONTRAST   Final Result   1. Lytic destructive lesion along the right lateral aspect of the L3   vertebral body, involving the right pedicle, transverse process and superior   articular facet. 2. Pathological fracture on the right with loss of height by approximately   60%.   The lesion may represent malignancy or sodium chloride flush 0.9 % injection (  Given 12/13/20 0932)        Re-examination:  12/12/20       Patients condition is improving. Consult(s):   Dr. Collin Marin - states MRI ok to wait til morning, will see on consult    Procedure(s):   none    Medical Decision Making/Counseling: Patient presents to the ED for evaluation of low back pain, worse on the right. He states he does have history of degenerative disc disease and arthritis. He has been taking NSAIDs without relief. He denies any infectious symptoms or traumatic injuries. He does have history of IV drug use 4 years ago. CT imaging showed destruction of multiple areas of the vertebrae concerning for malignancy versus infectious process. Case discussed with Dr. Collin Marin who will provide consultation. Recommended MRIs in the morning. CT chest abdomen pelvis obtained which did show likely a primary lung mass with metastasis to the bone. Patient admitted by house medicine for further management. Assessment      1. Malignant neoplasm metastatic to bone (HCC)    2. Lung mass    3. Pathological fracture of vertebra due to neoplastic disease, initial encounter      Plan   Admission Full to General Medical Floor. Patient condition is stable    New Medications     Current Discharge Medication List        Electronically signed by Claribel Cabrales DO   DD: 12/12/20  **This report was transcribed using voice recognition software. Every effort was made to ensure accuracy; however, inadvertent computerized transcription errors may be present.   END OF ED PROVIDER NOTE        Claribel Cabrales DO  12/13/20 4025

## 2020-12-13 NOTE — CONSULTS
[address] [address]  Dept: [phone]  Loc: [phone]  Attending Consult Note      Reason for Visit:   Metastatic Disease. Referring Physician:  Severino Muñoz DO    PCP:  No primary care provider on file. History of Present Illness:     Mr. Joseph Johnston is a 57-year-old male patient, with a past medical history significant for tobacco use disorder, degenerative joint disease, hypertension, liver disease, polysubstance abuse who had presented with worsening back pain for about 6 weeks. He had a CT scan of the lumbar spine done, revealin. Lytic destructive lesion along the right lateral aspect of the L3  vertebral body, involving the right pedicle, transverse process and superior  articular facet. 2. Pathological fracture on the right with loss of height by approximately  60%.  The lesion may represent malignancy or an infectious process. Destructive changes in the discs characteristic of discitis/osteomyelitis are  not evident on this study.  Further evaluation with pre and post contrast  enhanced MRI is recommended. 3. Soft tissue fullness extends from the L3 vertebral body into the adjacent  psoas muscle, which is landis than the contralateral side.  The soft tissue  component may represent malignancy or infection. 4. Extent of epidural involvement of disease at L3 is not well delineated on  this study.  Pre and post contrast enhanced MRI should clarify. 5. Variable degrees of degenerative changes, as described     He had a CT scan of the chest done, revealing a large heterogeneous left hilar mass measuring about 5.4 x 4.1 x 3.7 cm, with cclusion of left upper lobe bronchus and severe bronchial narrowing to the left lower lobe. Mildly spiculated nodular lesion, pleural based in the left lung apex likely satellite malignant lesion measuring 1.5 cm. Mediastinal adenopathy likely manifesting malignant involvement.  Large metastatic lesion to the T8 vertebral body with paraspinal an  intraspinal extension of malignancy and pathologic compression fracture of T8.  CT scan of the abdomen the pelvis had revealed hypodense lesions in the right lobe of the liver, the larger measuring 3.5 cm, consistent with metastatic disease, left wrist mass extending cephalad from the head of the pancreas, most suspicious for pancreatic malignancy measuring approximately 2.4 x 3 x 3.2 cm. Multiple lytic lesions in the bones intraspinal extension of acute process at the level of T8 and L3. MRI of the thoracic spine had revealed metastatic lesions in the T8 and T12 vertebral bodies, pathologic fracture of the T8 vertebral body with approximately 40% loss of height, epidural extension of tumor along the left lateral aspect of the T8, extending into the left T8-9 neural foramina, moderate central canal stenosis at T8, no cord lesion or cord edema seen. Lumbar spine MRI results pending. Review of Systems;  CONSTITUTIONAL: No fever, chills. Fair appetite and energy level, positive for intentional weight loss. ENMT: Eyes: No diplopia; Nose: No epistaxis. Mouth: No sore throat. RESPIRATORY: No hemoptysis, no significant shortness of breath, cough. CARDIOVASCULAR: No chest pain, palpitations. GASTROINTESTINAL: No nausea/vomiting, abdominal pain, diarrhea/constipation. GENITOURINARY: No dysuria, urinary frequency, hematuria. NEURO: No syncope, presyncope, headache.   Remainder:  ROS NEGATIVE    Past Medical History:      Diagnosis Date    Acute kidney injury ([address] 7/14/2017    Cocaine abuse ([address] 7/14/2017    Heroin abuse ([address] 7/14/2017    Hyperkalemia 7/14/2017    Hypertension     Liver disease     Non-traumatic rhabdomyolysis 7/14/2017    Psychiatric problem      Patient Active Problem List   Diagnosis    Hyperkalemia    Non-traumatic rhabdomyolysis    Heroin abuse ([address]    Cocaine abuse ([address]    Opiate overdose ([address]    Acute respiratory failure with hypoxia ([facility] [address]    Drug overdose    Traumatic rhabdomyolysis ([facility] [address]    Severe episode of recurrent major depressive disorder, without psychotic features ([facility] [address]    Acute renal failure (HCC)    Leukocytosis    Transaminitis    Severe single current episode of major depressive disorder, without psychotic features ([facility] [address]    Pathologic lumbar vertebral fracture, initial encounter        Past Surgical History:      Procedure Laterality Date    HERNIA REPAIR         Family History:  Family History   Problem Relation Age of Onset    Other Mother     Depression Mother     High Blood Pressure Mother     Coronary Art Dis Father     High Blood Pressure Father     Mental Illness Sister     No Known Problems Brother     No Known Problems Brother     No Known Problems Sister        Medications:  Reviewed and reconciled. Social History:  Social History     Socioeconomic History    Marital status:      Spouse name: Not on file    Number of children: Not on file    Years of education: Not on file    Highest education level: Not on file   Occupational History    Not on file   Social Needs    Financial resource strain: Not on file    Food insecurity     Worry: Not on file     Inability: Not on file   [employer] needs     Medical: Not on file     Non-medical: Not on file   Tobacco Use    Smoking status: Current Every Day Smoker     Packs/day: 1.50     Types: Cigarettes    Smokeless tobacco: Never Used   Substance and Sexual Activity    Alcohol use:  Yes     Alcohol/week: 72.0 standard drinks     Types: 70 Cans of beer, 2 Shots of liquor per week    Drug use: Yes     Types: IV, Cocaine     Comment: fentanyl, heroin    Sexual activity: Yes     Partners: Female   Lifestyle    Physical activity     Days per week: Not on file     Minutes per session: Not on file    Stress: Not on file   Relationships    Social connections     Talks on phone: Not on file     Gets together: Not on file     Attends [employer] service: Not on file     Active member of club or organization: Not on file     Attends meetings of clubs or organizations: Not on file     Relationship status: Not on file    Intimate partner violence     Fear of current or ex partner: Not on file     Emotionally abused: Not on file     Physically abused: Not on file     Forced sexual activity: Not on file   Other Topics Concern    Not on file   Social History Narrative    Not on file       Allergies:  No Known Allergies    Physical Exam:  /80   Pulse 116   Temp 97.3 °F (36.3 °C) (Temporal)   Resp 17   Ht 5' 9\" (1.753 m)   Wt 195 lb (88.5 kg)   SpO2 99%   BMI 28.80 kg/m²   GENERAL: Alert, oriented x 3, not in acute distress. HEENT: PERRLA; EOMI. Oropharynx clear. NECK: Supple. No palpable cervical or supraclavicular lymphadenopathy. LUNGS: Decreased air entry bilaterally  CARDIOVASCULAR: Regular rhythm, tachycardic. ABDOMEN: Soft. Non-tender, non-distended. Positive bowel sounds. EXTREMITIES: Without clubbing, cyanosis, or edema. NEUROLOGIC: No focal deficits.    ECOG PS 1     Impression/Plan:      Mr. Kristen Corral is a 17-year-old male patient, with a past medical history significant for tobacco use disorder, degenerative joint disease, hypertension, liver disease, polysubstance abuse who had presented with worsening back pain, he was found to have metastatic disease, he has a large heterogeneous left hilar mass measuring about 5.4 cm, with occlusion of the left upper lobe bronchus with severe bronchial narrowing to the left lower lobe, left lung apex lesion measuring 1.5 cm, mediastinal adenopathy, metastasis to the liver, possibly the pancreas (vs primary pancreatic), and the spine,MRI of the thoracic spine had revealed metastatic lesions in the T8 and T12 vertebral bodies, pathologic fracture of the T8 vertebral body with approximately 40% loss of height, epidural extension of tumor along the left lateral aspect of the T8, extending into the left T8-9 neural foramina, moderate central canal stenosis at T8, no cord lesion or cord edema seen, he also has a destructive lesion along the right lateral aspect of the L3 vertebral body, lumbar spine MRI results pending. The patient has a probable lung cancer, he has a widely metastatic disease.  -We will complete the staging work-up, he will have a brain MRI and a bone scan done. - The patient was evaluated by neurosurgery, no surgical interventions were recommended. Consult Rad Onc for palliative RT to the spine.  - D/W pulmonary, he will be evaluated for bronch, await path results. - Pain Control.  -Consult Pall Med.  - Pancreatic mass, probably from metastatic disease, will consult Dr. Chavez Chen. - Mild hypercalcemia, secondary to bony mets, if worsening hypercalcemia, will treat with bisphosphonates. Will continue to follow. Thank you for allowing us to participate in the care of Mr. Chiang.     Shilpa Mcintyre MD   HEMATOLOGY/MEDICAL ONCOLOGY  [facility]  [address] [place] [address]  Dept: [address]: [phone]

## 2020-12-13 NOTE — PROGRESS NOTES
Hospitalist Progress Note      Synopsis: Patient admitted on 12/12/2020     Subjective    Patient presented to the ER with back pain. Pain was significant with radiation. Investigations in the ER showed a lung mass and metastatic areas in the thoracic and lumbar spine which are now confirmed. He just came back from his MRI    Exam:  /80   Pulse 116   Temp 97.3 °F (36.3 °C) (Temporal)   Resp 17   Ht 5' 9\" (1.753 m)   Wt 195 lb (88.5 kg)   SpO2 99%   BMI 28.80 kg/m²   General appearance: No apparent distress, appears stated age and cooperative. HEENT: Pupils equal, round, and reactive to light. Conjunctivae/corneas clear. Neck: Supple. No jugular venous distention. Trachea midline. Respiratory:  Normal respiratory effort. Clear to auscultation, bilaterally without Rales/Wheezes/Rhonchi. Cardiovascular: Regular rate and rhythm with normal S1/S2 without murmurs, rubs or gallops. Abdomen: Soft, non-tender, non-distended with normal bowel sounds. Musculoskeletal: No clubbing, cyanosis or edema bilaterally. Brisk capillary refill. 2+ lower extremity pulses (dorsalis pedis).    Skin:  No rashes    Neurologic: awake, alert and following commands     Medications:  Reviewed    Infusion Medications   Scheduled Medications    amLODIPine  10 mg Oral Daily    carvedilol  25 mg Oral BID WC    folic acid  1 mg Oral Daily    furosemide  40 mg Oral Daily    gabapentin  200 mg Oral TID    hydrALAZINE  75 mg Oral 3 times per day    mirtazapine  15 mg Oral Nightly    pantoprazole  40 mg Oral QAM AC     PRN Meds: cyclobenzaprine, acetaminophen, morphine, sodium chloride flush    I/O    Intake/Output Summary (Last 24 hours) at 12/13/2020 1548  Last data filed at 12/13/2020 0813  Gross per 24 hour   Intake 480 ml   Output 600 ml   Net -120 ml       Labs:   Recent Labs     12/13/20  0535   WBC 14.4*   HGB 14.6   HCT 42.8          Recent Labs     12/12/20 2033 12/13/20  0535    136   K 4.2 4.0 CL 99 100   CO2 27 22   BUN 20 21*   CREATININE 1.2 1.0   CALCIUM 10.8* 10.6*       Recent Labs     12/13/20  0535   PROT 6.9   ALKPHOS 71   ALT 24   AST 14   BILITOT 0.7       No results for input(s): INR in the last 72 hours. No results for input(s): Vito Speaks in the last 72 hours. Chronic labs:  Lab Results   Component Value Date    INR 1.1 07/28/2017       Radiology:  Ct Chest W Contrast    Result Date: 12/12/2020  EXAMINATION: CT OF THE CHEST WITH CONTRAST 12/12/2020 9:36 pm TECHNIQUE: CT of the chest was performed with the administration of intravenous contrast. Multiplanar reformatted images are provided for review. Dose modulation, iterative reconstruction, and/or weight based adjustment of the mA/kV was utilized to reduce the radiation dose to as low as reasonably achievable. COMPARISON: None. HISTORY: ORDERING SYSTEM PROVIDED HISTORY: Possible malignancy TECHNOLOGIST PROVIDED HISTORY: Reason for exam:->Possible malignancy What reading provider will be dictating this exam?->CRC FINDINGS: Mediastinum: Large heterogeneous left hilar mass measuring approximately 5.4 x 4.1 x 3.7 cm consistent with malignancy. There is occlusion of left upper lobe bronchus and severe bronchial narrowing to the left lower lobe. Associated mediastinal adenopathy in the prevascular space, pretracheal retrocaval space, aortic or pulmonary window, consistent with metastatic involvement. Largest prevascular node measures approximately 2.1 cm in diameter. No aortic dissection or aneurysmal dilatation. No pericardial effusion. Lungs/pleura: Mildly spiculated nodular lesion, pleural based in the left lung apex which may represent satellite lesion measuring approximately 1.5 cm in diameter. Emphysematous changes in the lungs. No pleural effusions. Upper Abdomen: Hypodense hepatic lesions. See separate report of CT of the abdomen and pelvis.  Soft Tissues/Bones: Large lytic lesion at the T8 vertebral body with paraspinal extension and intraspinal extension of malignancy. Pathologic compression deformity of T8. Large heterogeneous left hilar mass as described consistent with malignancy. Occlusion of left upper lobe bronchus and severe bronchial narrowing to the left lower lobe. Mildly spiculated nodular lesion, pleural based in the left lung apex likely satellite malignant lesion measuring 1.5 cm. Mediastinal adenopathy likely manifesting malignant involvement. Large metastatic lesion to the T8 vertebral body with paraspinal an intraspinal extension of malignancy and pathologic compression fracture of T8. Ct Lumbar Spine Wo Contrast    Result Date: 12/12/2020  EXAMINATION: CT OF THE LUMBAR SPINE WITHOUT CONTRAST  12/12/2020 TECHNIQUE: CT of the lumbar spine was performed without the administration of intravenous contrast. Multiplanar reformatted images are provided for review. Dose modulation, iterative reconstruction, and/or weight based adjustment of the mA/kV was utilized to reduce the radiation dose to as low as reasonably achievable. COMPARISON: None HISTORY: ORDERING SYSTEM PROVIDED HISTORY: pain TECHNOLOGIST PROVIDED HISTORY: Reason for exam:->pain What reading provider will be dictating this exam?->CRC FINDINGS: BONES/ALIGNMENT: There are lytic destructive changes in the L3 vertebral body, more so on the right. Lytic lesion extends to the right pedicle, transverse process and superior articular facet. There is pathological fracture involving the right lateral aspect of the L3 vertebral body, with approximately 60% loss of height. Soft tissue fullness extends into the adjacent psoas muscle. The extent of epidural involvement is unclear on this study. DEGENERATIVE CHANGES: L1-2: Unremarkable. L2-3: Small disc bulge. Mild facet hypertrophy. Mild central canal, lateral recess and neural foraminal stenoses. L3-4: Destructive changes in the right L3 vertebral body as described.  Extent of epidural involvement and therefore the extent of central canal stenosis is not well delineated on this study. The lesion likely extends into the right neural foramina. Moderate stenosis of the left neural foramina. L4-5: Prominent loss of disc height with small disc bulge. Mild facet and ligamentous hypertrophy. Mild-to-moderate central canal and lateral recess stenoses. Moderate neural foraminal stenoses. L5-S1: Minimal disc bulge. Mild facet hypertrophy. No significant central canal or lateral recess stenosis. Mild to moderate neural foraminal stenoses. SOFT TISSUES/RETROPERITONEUM: Soft tissue fullness in the right psoas muscle, extending from the L3 level inferiorly. 1. Lytic destructive lesion along the right lateral aspect of the L3 vertebral body, involving the right pedicle, transverse process and superior articular facet. 2. Pathological fracture on the right with loss of height by approximately 60%. The lesion may represent malignancy or an infectious process. Destructive changes in the discs characteristic of discitis/osteomyelitis are not evident on this study. Further evaluation with pre and post contrast enhanced MRI is recommended. 3. Soft tissue fullness extends from the L3 vertebral body into the adjacent psoas muscle, which is landis than the contralateral side. The soft tissue component may represent malignancy or infection. 4. Extent of epidural involvement of disease at L3 is not well delineated on this study. Pre and post contrast enhanced MRI should clarify. 5. Variable degrees of degenerative changes, as described. Ct Abdomen Pelvis W Iv Contrast Additional Contrast? None    Result Date: 12/12/2020  EXAMINATION: CT OF THE ABDOMEN AND PELVIS WITH CONTRAST 12/12/2020 9:36 pm TECHNIQUE: CT of the abdomen and pelvis was performed with the administration of intravenous contrast. Multiplanar reformatted images are provided for review.  Dose modulation, iterative reconstruction, and/or weight based disc space, and to the L3-L4 intervertebral disc space. There is involvement of the right pedicle and right articular facet of L3. Large component extending to the right iliopsoas muscle which is asymmetrically enlarged consistent with malignant involvement. This process extends caudally along the course of the right psoas muscle with hypodense component, with mild peripheral enhancement consistent with malignant involvement, versus associated psoas abscess, felt to be a less likely consideration, measuring approximately 8.1 cm in cephalocaudal dimension. Additional lytic lesion involving the proximal left femur extending to the femoral neck with associated soft tissue component measuring approximately 4.3 cm in diameter. Hypodense lesions in the right lobe of the liver, the larger measuring 3.5 cm in diameter and was consistent with metastatic disease. Mildly heterogeneous mass extending cephalad from the head of the pancreas, most suspicious for pancreatic malignancy measuring approximately 2.4 x 3 x 3.2 cm in diameter. Multiple lytic lesions as described involving the T8 vertebral body, T12 vertebral body, L3 vertebral body, and proximal left femur. Pathologic compression fracture of L3 and extensive extension of malignant process to the paraspinal soft tissues anteriorly and to the right of L3, to the adjacent intervertebral discs, and spinal canal.  Extension into the right pedicle and right articular facet and large component extending to the right iliopsoas muscle which is asymmetrically enlarged. Hypodense process extending caudally along the course of the right psoas muscle with mild peripheral enhancement, likely malignant involvement versus associated abscess, felt to be a less likely consideration. Intraspinal extension of malignant process at the level of T8, and L3. Xr Eye Foreign Body    Result Date: 12/13/2020  EXAMINATION: TWO XRAY VIEWS OF THE ORBITS 12/13/2020 COMPARISON: None.  HISTORY: ORDERING SYSTEM PROVIDED HISTORY: Foreign body, eye, unspecified laterality, initial encounter TECHNOLOGIST PROVIDED HISTORY: Reason for exam:->Foreign body, eye, unspecified laterality, initial encounter What reading provider will be dictating this exam?->CRC FINDINGS: No evidence of radiopaque foreign body within the orbits. No other acute abnormalities noted. No evidence of metallic foreign body within the orbits. ASSESSMENT:    Active Problems:    Pathologic lumbar vertebral fracture, initial encounter  Resolved Problems:    * No resolved hospital problems. *       PLAN:    1. Oncology for cancer of unknown primary site  2. Radiation oncology  3. Also we will need to get pulmonary involved for possible biopsy of the pulmonary mass left hilar  4. Previous history of opiate addiction with opiate screen positive without a known prescription from prior? 5. Continue with premorbid medication of hypertension    Diet: DIET NO SALT ADDED (3-4 GM); Code Status: Prior  PT/OT Eval Status:   Unable yet  DVT Prophylaxis:   Can place  Recommended disposition at discharge:  unsure yet    +++++++++++++++++++++++++++++++++++++++++++++++++  Cecille Young MD   [facility].  +++++++++++++++++++++++++++++++++++++++++++++++++  NOTE: This report was transcribed using voice recognition software. Every effort was made to ensure accuracy; however, inadvertent computerized transcription errors may be present.

## 2020-12-13 NOTE — PROGRESS NOTES
Patient unsure about all medication dosages.  Per patient, he will make a phone call to his \"yennifer\" to look for his medication list.

## 2020-12-14 NOTE — CONSULTS
Mk William M.D.,[facility]  Meera Helm D.O., F.SOLANGE., Tung Ayala M.D. Annabella Marti M.D., Mildred Henry M.D. Michele Muniz D.O. Patient:  Sonja Currie 44 y.o. male MRN: [id number]     Date of Service: 12/14/2020      PULMONARY CONSULTATION    Reason for Consultation: lung mass bx  Referring Physician: Dr. Didi Goodrich    Communication with the referring physician will be sent via the electronic medical record. Chief Complaint: back pain     CODE STATUS:prior     SUBJECTIVE:  HPI:  Sonja Currie is a 44 y.o. Presented to the ED 2 days ago with back pain. PMH drug abuse and liver disease . PT reports weight loss 28 lbs in 6 months , states it was intentional . He denies any abdominal pain, nausea, vomiting, melena, hematochezia, fever, or chills. 1.5PPD smoker \"for years\" and reports drinking \"a lot\" of alcohol. But quit smoking during his incarceration , has recently resumed smoking after his Dec 1 st release. Incarcerated for the last 3-4 years and did not have any problems during his check-ups at the institution. He reports a family history of cancer, but does not remember who or what types of cancer they had. He has had a hernia repair in the past and is not on any anticoagulation. Imigaing reveal lung , pancrease and lumbar spine mets. Oncology consulted , for Liver biopsy today ,  Our service has been consulted for lung mass. Known to our service seen in 2017 in the ICU drug overdose . Patient seen in room laying in bed appears in NAD , no labored breathing . He is agitated as he has significant back pain and his PCA of morphine . He states he just wants  to get the biopsy over with and get our of her \" Gene Older been locked up and just released on Dec 1 st. \"He reports that his complaints of SOB , back pain were not addressed in the assisted system. Denies fever, chills, or night sweats. He denies ever having covid , TB or pneumonia during his incarceration.         Past Medical History:   Diagnosis Date    Acute kidney injury ([facility] 7/14/2017    Cocaine abuse ([facility] 7/14/2017    Heroin abuse ([facility] 7/14/2017    Hyperkalemia 7/14/2017    Hypertension     Liver disease     Non-traumatic rhabdomyolysis 7/14/2017    Psychiatric problem        Past Surgical History:   Procedure Laterality Date    HERNIA REPAIR         Family History   Problem Relation Age of Onset    Other Mother     Depression Mother     High Blood Pressure Mother     Coronary Art Dis Father     High Blood Pressure Father     Mental Illness Sister     No Known Problems Brother     No Known Problems Brother     No Known Problems Sister        Social History:   Social History     Socioeconomic History    Marital status:      Spouse name: Not on file    Number of children: Not on file    Years of education: Not on file    Highest education level: Not on file   Occupational History    Not on file   Social Needs    Financial resource strain: Not on file    Food insecurity     Worry: Not on file     Inability: Not on file    Transportation needs     Medical: Not on file     Non-medical: Not on file   Tobacco Use    Smoking status: Current Every Day Smoker     Packs/day: 1.50     Types: Cigarettes    Smokeless tobacco: Never Used   Substance and Sexual Activity    Alcohol use:  Yes     Alcohol/week: 72.0 standard drinks     Types: 70 Cans of beer, 2 Shots of liquor per week    Drug use: Yes     Types: IV, Cocaine     Comment: fentanyl, heroin    Sexual activity: Yes     Partners: Female   Lifestyle    Physical activity     Days per week: Not on file     Minutes per session: Not on file    Stress: Not on file   Relationships    Social connections     Talks on phone: Not on file     Gets together: Not on file     Attends Mu-ism service: Not on file     Active member of club or organization: Not on file     Attends meetings of clubs or organizations: Not on file     Relationship status: Not on file    Intimate partner violence     Fear of current or ex partner: Not on file     Emotionally abused: Not on file     Physically abused: Not on file     Forced sexual activity: Not on file   Other Topics Concern    Not on file   Social History Narrative    Not on file         Family History: Mother - Cancer, unknown type,    Maternal uncle - Cancer, unknown type  ETOH:   reports current alcohol use of about 72.0 standard drinks of alcohol per week. Exposures: There  is not history of TB or TB exposure, although recently spent 3 years in prison released 10 days ago. There is not asbestos or silica dust exposure. The patient reports does not have coal, foundry, quarry or [employer] exposure. Recent travel history none. There is not  history of recreational or IV drug use. There is not hot tub exposure. The patient does not have any exotic pets, turtles or exotic birds.        Immunization History   Administered Date(s) Administered    Tdap (Boostrix, Adacel) 2017        Home Meds: Medications Prior to Admission: busPIRone (BUSPAR) 10 MG tablet, Take 10 mg by mouth nightly  lisinopril (PRINIVIL;ZESTRIL) 20 MG tablet, Take 20 mg by mouth daily  naproxen (NAPROSYN) 500 MG tablet, Take 1 tablet by mouth 2 times daily for 7 days  acetaminophen (TYLENOL) 500 MG tablet, Take 2 tablets by mouth 3 times daily for 7 days  gabapentin (NEURONTIN) 100 MG capsule, Take 2 capsules by mouth 3 times daily  vitamin B-1 100 MG tablet, Take 1 tablet by mouth daily  [DISCONTINUED] cyclobenzaprine (FLEXERIL) 10 MG tablet, Take 1 tablet by mouth 3 times daily as needed for Muscle spasms  [DISCONTINUED] hydrALAZINE (APRESOLINE) 25 MG tablet, Take 3 tablets by mouth every 8 hours  [DISCONTINUED] amLODIPine (NORVASC) 10 MG tablet, Take 1 tablet by mouth daily  [DISCONTINUED] folic acid (FOLVITE) 1 MG tablet, Take 1 tablet by mouth daily  [DISCONTINUED] pantoprazole (PROTONIX) 40 MG tablet, Take 1 tablet by mouth every morning (before breakfast)    CURRENT MEDS :  Scheduled Meds:   amLODIPine  10 mg Oral Daily    carvedilol  25 mg Oral BID WC    folic acid  1 mg Oral Daily    furosemide  40 mg Oral Daily    gabapentin  200 mg Oral TID    hydrALAZINE  75 mg Oral 3 times per day    mirtazapine  15 mg Oral Nightly    enoxaparin  40 mg Subcutaneous Daily    sennosides-docusate sodium  2 tablet Oral BID    polyethylene glycol  17 g Oral Daily    dexamethasone  10 mg Intravenous Daily with breakfast    pantoprazole  40 mg Intravenous Daily    And    sodium chloride (PF)  10 mL Intravenous Daily       Continuous Infusions:   morphine         No Known Allergies    REVIEW OF SYSTEMS:  Constitutional: Denies fever, weight loss, night sweats, and fatigue  Skin: Denies pigmentation, dark lesions, and rashes   HEENT: Denies hearing loss, tinnitus, ear drainage, epistaxis, sore throat, and hoarseness. Cardiovascular: Denies palpitations, chest pain, and chest pressure. Respiratory: +cough, +dyspnea at rest, hemoptysis, apnea, and choking.   Gastrointestinal: Denies nausea, vomiting, poor appetite, diarrhea, heartburn or reflux  Genitourinary: Denies dysuria, frequency, urgency or hematuria  Musculoskeletal: Denies myalgias, muscle weakness, and bone pain  Neurological: Denies dizziness, vertigo, headache, and focal weakness  Psychological: Denies anxiety and depression  Endocrine: Denies heat intolerance and cold intolerance  Hematopoietic/Lymphatic: Denies bleeding problems and blood transfusions  + back pain   OBJECTIVE:   /82   Pulse 101   Temp 97.8 °F (36.6 °C) (Temporal)   Resp 16   Ht 5' 9\" (1.753 m)   Wt 195 lb (88.5 kg)   SpO2 98%   BMI 28.80 kg/m²   SpO2 Readings from Last 1 Encounters:   12/14/20 98%        I/O:    Intake/Output Summary (Last 24 hours) at 12/14/2020 1134  Last data filed at 12/14/2020 0700  Gross per 24 hour   Intake 360 ml   Output --   Net 360 ml     Vent Information  SpO2: 98 %                Physical Exam:  General: The patient is lying in bed comfortably without any distress. Breathing is not labored  HEENT: Pupils are equal round and reactive to light, there are no oral lesions and no post-nasal drip   Neck: supple without adenopathy  Cardiovascular: regular rate and rhythm without murmur or gallop  Respiratory: Clear to auscultation bilaterally without wheezing or crackles, TANG decreased breath sound . Air entry is symmetric  Abdomen: soft, non-tender, non-distended, normal bowel sounds  Extremities: warm, no edema, no clubbing  Skin: no rash or lesion  Neurologic: CN II-XII grossly intact, no focal deficits    Pulmonary Function Testing personally reviewed and interpreted  none    Imaging personally reviewed:  EXAMINATION:   CT OF THE CHEST WITH CONTRAST 12/12/2020 9:36 pm   TECHNIQUE:   CT of the chest was performed with the administration of intravenous   contrast. Multiplanar reformatted images are provided for review. Dose   modulation, iterative reconstruction, and/or weight based adjustment of the   mA/kV was utilized to reduce the radiation dose to as low as reasonably   achievable. COMPARISON:   None. HISTORY:   ORDERING SYSTEM PROVIDED HISTORY: Possible malignancy   TECHNOLOGIST PROVIDED HISTORY:   Reason for exam:->Possible malignancy   What reading provider will be dictating this exam?->CRC   FINDINGS:   Mediastinum: Large heterogeneous left hilar mass measuring approximately 5.4   x 4.1 x 3.7 cm consistent with malignancy. There is occlusion of left upper lobe bronchus and severe bronchial narrowing   to the left lower lobe. Associated mediastinal adenopathy in the prevascular space, pretracheal   retrocaval space, aortic or pulmonary window, consistent with metastatic   involvement.  Largest prevascular node measures approximately 2.1 cm in   diameter. No aortic dissection or aneurysmal dilatation. No pericardial effusion.    Lungs/pleura: Mildly spiculated 94 12/14/2020    CO2 27 12/14/2020    BUN 33 12/14/2020    CREATININE 1.1 12/14/2020    LABALBU 3.8 12/14/2020    CALCIUM 10.3 12/14/2020    GFRAA >60 12/14/2020    LABGLOM >60 12/14/2020     Lab Results   Component Value Date    PROTIME 12.7 12/14/2020    INR 1.1 12/14/2020     No results for input(s): PROBNP in the last 72 hours. No results for input(s): TROPONINI in the last 72 hours. No results for input(s): PROCAL in the last 72 hours. This SmartLink has not been configured with any valid records. Micro:  No results for input(s): CULTRESP in the last 72 hours. No results for input(s): LABGRAM in the last 72 hours. No results for input(s): LEGUR in the last 72 hours. No results for input(s): STREPNEUMAGU in the last 72 hours. No results for input(s): LP1UAG in the last 72 hours. Assessment:  1. Lung cancer with metastasis to thoracis and lumbar spine,2.4x3x3.2 cm diameter  pancreas, and liver  2. Polysubstance abuse , hx of overdose 2017  3. Major depressive disorder   4. Nicotine dependence     Plan:  1. Keep pulse oximetry >92% , currenlty on RA 98%  2. palliative medicine following managing morphine PCA  3. IR liver biopsy today   4. EBUS   5. Decadron daily   6. Oncology following       Thank you for allowing me to participate in the care of Abida Jeong. Please feel free to call with questions. This plan of care was reviewed in collaboration with Dr. Husam Donis    Electronically signed by Arita Boas, APRN on 12/14/2020 at 11:34 AM      Note: This report was completed utilizing computer voice recognition software. Every effort has been made to ensure accuracy, however; inadvertent computerized transcription errors may be present    Addendum:    I personally saw, examined and provided care for the patient. Radiographs, labs and medication list were reviewed by me independently. Patient just back from IR with liver biopsy.  Will await results of biopsy if inconclusive then we'll schedule him for bronchoscopy with EBUS. I have tenatively scheduled him for a bronchoscopy on Wednesday 12/16 at noon. Await radiation oncology input. The case was discussed in detail and plans for care were established. Review of Residents documentation was conducted and revisions were made as appropriate. I agree with the above documented exam, problem list and plan of care.     Estrella Turner MD

## 2020-12-14 NOTE — INTERVAL H&P NOTE
H&P Update    Patient's History and Physical  was reviewed. The patient appears likely to able to tolerate the procedure. Risk and benefits discussed including ultimate complications, possibly death and consent obtained.     Lyndsey Alicea, II

## 2020-12-14 NOTE — CONSULTS
Hepatobiliary and Pancreatic Surgery Attending History and Physical    Patient's Name/Date of Birth: Landis Paget /1970 (4 y.o.)    Date: December 14, 2020     CC: metastatic cancer    HPI:  Adrian Blair is a pleasant 45 y/o M with hx of drug abuse and liver disease who was admitted for lower back pain for the last 6 weeks. He states that he has had back pain without radiation after a coughing episode. He states that he has been losing weight, about 28lbs in the last 6 months, but reports that this was intentional by eating less. He also reports some flushing, wheezing, and a couple episodes of diarrhea in the last few weeks. He denies any abdominal pain, nausea, vomiting, melena, hematochezia, fever, or chills. He has been a 1.5PPD smoker \"for years\" and reports drinking \"a lot\" of alcohol but does not specify how much. He also states that he has been incarcerated for the last 3-4 years and did not have any problems during his check-ups at the institution. He reports a family history of cancer, but does not remember who or what types of cancer they had. He has had a hernia repair in the past and is not on any anticoagulation.     Past Medical History:   Diagnosis Date    Acute kidney injury ([facility] [address] 7/14/2017    Cocaine abuse ([facility] [address] 7/14/2017    Heroin abuse ([facility] [address] 7/14/2017    Hyperkalemia 7/14/2017    Hypertension     Liver disease     Non-traumatic rhabdomyolysis 7/14/2017    Psychiatric problem        Past Surgical History:   Procedure Laterality Date    HERNIA REPAIR         Current Facility-Administered Medications   Medication Dose Route Frequency Provider Last Rate Last Admin    gadobenate dimeglumine (MULTIHANCE) injection 20 mL  20 mL Intravenous ONCE PRN Davidson Mendes,         amLODIPine (NORVASC) tablet 10 mg  10 mg Oral Daily Alona Fails, DO   10 mg at 12/13/20 0933    carvedilol (COREG) tablet 25 mg  25 mg Oral BID  Alona Fails, DO   25 mg at 31/88/47 5081    folic acid (FOLVITE) tablet 1 mg  1 mg Oral Daily Aaron Georgina, DO   1 mg at 12/13/20 0932    furosemide (LASIX) tablet 40 mg  40 mg Oral Daily Aaron Georgina, DO   40 mg at 12/13/20 0932    gabapentin (NEURONTIN) capsule 200 mg  200 mg Oral TID Aaron Georgina, DO   200 mg at 12/13/20 2248    hydrALAZINE (APRESOLINE) tablet 75 mg  75 mg Oral 3 times per day Aaron Georgina, DO   Stopped at 12/14/20 0600    mirtazapine (REMERON) tablet 15 mg  15 mg Oral Nightly Aaron Georgina, DO   15 mg at 12/13/20 2250    acetaminophen (TYLENOL) tablet 650 mg  650 mg Oral Q4H PRN Aaron Georgina, DO   650 mg at 12/13/20 0932    sodium chloride flush 0.9 % injection 10 mL  10 mL Intravenous PRN Issac Null MD   10 mL at 12/13/20 1230    enoxaparin (LOVENOX) injection 40 mg  40 mg Subcutaneous Daily Chris Neumann MD   40 mg at 12/13/20 2300    naloxone [facility]) injection 0.4 mg  0.4 mg Intravenous PRN FABI Ramirez        morphine PCA 1 mg/mL   Intravenous Continuous FABI Ramirez   New Bag at 12/13/20 2107    sennosides-docusate sodium (SENOKOT-S) 8.6-50 MG tablet 2 tablet  2 tablet Oral BID FABI Ramirez   2 tablet at 12/13/20 2247    polyethylene glycol (GLYCOLAX) packet 17 g  17 g Oral Daily FABI Ramirez   17 g at 12/13/20 2300    dexamethasone (DECADRON) injection 10 mg  10 mg Intravenous Daily with breakfast FABI Ramirez        pantoprazole (PROTONIX) injection 40 mg  40 mg Intravenous Daily FABI Ramirez   40 mg at 12/13/20 2246    And    sodium chloride (PF) 0.9 % injection 10 mL  10 mL Intravenous Daily FABI Ramirez   10 mL at 12/13/20 2247       No Known Allergies    Family History   Problem Relation Age of Onset    Other Mother     Depression Mother     High Blood Pressure Mother     Coronary Art Dis Father     High Blood Pressure Father     Mental Illness Sister     No Known Problems Brother     No Known Problems Brother     No Known Problems Sister        Social History Socioeconomic History    Marital status:      Spouse name: Not on file    Number of children: Not on file    Years of education: Not on file    Highest education level: Not on file   Occupational History    Not on file   Social Needs    Financial resource strain: Not on file    Food insecurity     Worry: Not on file     Inability: Not on file    Transportation needs     Medical: Not on file     Non-medical: Not on file   Tobacco Use    Smoking status: Current Every Day Smoker     Packs/day: 1.50     Types: Cigarettes    Smokeless tobacco: Never Used   Substance and Sexual Activity    Alcohol use: Yes     Alcohol/week: 72.0 standard drinks     Types: 70 Cans of beer, 2 Shots of liquor per week    Drug use: Yes     Types: IV, Cocaine     Comment: fentanyl, heroin    Sexual activity: Yes     Partners: Female   Lifestyle    Physical activity     Days per week: Not on file     Minutes per session: Not on file    Stress: Not on file   Relationships    Social connections     Talks on phone: Not on file     Gets together: Not on file     Attends Restoration service: Not on file     Active member of club or organization: Not on file     Attends meetings of clubs or organizations: Not on file     Relationship status: Not on file    Intimate partner violence     Fear of current or ex partner: Not on file     Emotionally abused: Not on file     Physically abused: Not on file     Forced sexual activity: Not on file   Other Topics Concern    Not on file   Social History Narrative    Not on file       ROS:   Review of Systems   Constitutional: Positive for activity change and appetite change. Negative for chills, diaphoresis and fever. HENT: Negative for congestion, ear discharge, ear pain, hearing loss, nosebleeds and tinnitus. Eyes: Negative for photophobia, pain and discharge. Respiratory: Positive for shortness of breath. Cardiovascular: Negative for palpitations and leg swelling. Gastrointestinal: Positive for abdominal pain. Negative for blood in stool, constipation, diarrhea, nausea and vomiting. Endocrine: Negative for polydipsia. Genitourinary: Negative for frequency, hematuria and urgency. Musculoskeletal: Positive for back pain. Negative for neck pain. Skin: Negative for rash. Allergic/Immunologic: Negative for environmental allergies. Neurological: Negative for tremors and seizures. Psychiatric/Behavioral: Negative for hallucinations and suicidal ideas. The patient is not nervous/anxious. Physical Exam:  Vitals:    12/14/20 0600   BP: 108/66   Pulse: 92   Resp: 14   Temp: 98.2 °F (36.8 °C)   SpO2: 93%       PSYCH: mood and affect normal, alert and oriented x 3  CONSTITUTIONAL: No apparent distress, comfortable  EYES: Sclera white, pupils equal round and reactive to light  ENMT:  Hearing normal, trachea midline, ears externally intact  LYMPH: no lympadenopathy in neck. Nolympadenopathy in groins  RESP: Breath sounds were clear and equal with no rales, wheezes, or rhonchi. Respiratory effort was normal with no retractions or use of accessory muscles. CV: Heart soundswere normal with a regular rate and rhythm. No pedal edema  GI/ Abdomen: Soft, nondistended, nontender, no guarding, no peritoneal signs  MSK: no clubbing/ no cyanosis/ gaitnormal       Assessment/Plan:  Metastatic cancer to the liver  - IR liver biopsy today  -   - EBUS also today  - follow up remainder tumor markers  - okay for diet after biopsy    Thank you for the consultation allowing me to take part in Mr. Chiang's care.      Lucita Donis M.D.  12/14/2020  7:47 AM

## 2020-12-14 NOTE — CONSULTS
Hepatobiliary and Pancreatic Surgery Attending History and Physical    Patient's Name/Date of Birth: Julita Bateman /1970 (60 y.o.)    Date: December 14, 2020     CC: Back Pain    HPI:  Dwayne Mckeon is a pleasant 45 y/o M with hx of drug abuse and liver disease who was admitted for lower back pain for the last 6 weeks. He states that he has had back pain without radiation after a coughing episode. He states that he has been losing weight, about 28lbs in the last 6 months, but reports that this was intentional by eating less. He also reports some flushing, wheezing, and a couple episodes of diarrhea in the last few weeks. He denies any abdominal pain, nausea, vomiting, melena, hematochezia, fever, or chills. He has been a 1.5PPD smoker \"for years\" and reports drinking \"a lot\" of alcohol but does not specify how much. He also states that he has been incarcerated for the last 3-4 years and did not have any problems during his check-ups at the institution. He reports a family history of cancer, but does not remember who or what types of cancer they had. He has had a hernia repair in the past and is not on any anticoagulation.     Past Medical History:   Diagnosis Date    Acute kidney injury ([facility] [address] 7/14/2017    Cocaine abuse ([facility] [address] 7/14/2017    Heroin abuse ([facility] [address] 7/14/2017    Hyperkalemia 7/14/2017    Hypertension     Liver disease     Non-traumatic rhabdomyolysis 7/14/2017    Psychiatric problem        Past Surgical History:   Procedure Laterality Date    HERNIA REPAIR         Current Facility-Administered Medications   Medication Dose Route Frequency Provider Last Rate Last Admin    amLODIPine (NORVASC) tablet 10 mg  10 mg Oral Daily Noble Congress, DO   10 mg at 12/13/20 0933    carvedilol (COREG) tablet 25 mg  25 mg Oral BID  Noble Congress, DO   25 mg at 30/20/43 6629    folic acid (FOLVITE) tablet 1 mg  1 mg Oral Daily [place] Congress, DO   1 mg at 12/13/20 0932    furosemide (LASIX) tablet 40 mg  40 mg Oral Daily Norma Blades, DO   40 mg at 12/13/20 0932    gabapentin (NEURONTIN) capsule 200 mg  200 mg Oral TID Norma Blades, DO   200 mg at 12/13/20 2248    hydrALAZINE (APRESOLINE) tablet 75 mg  75 mg Oral 3 times per day Norma Blades, DO   Stopped at 12/14/20 0600    mirtazapine (REMERON) tablet 15 mg  15 mg Oral Nightly Norma Blades, DO   15 mg at 12/13/20 2250    acetaminophen (TYLENOL) tablet 650 mg  650 mg Oral Q4H PRN Norma Blades, DO   650 mg at 12/13/20 0932    sodium chloride flush 0.9 % injection 10 mL  10 mL Intravenous PRN Stevie Carlos MD   10 mL at 12/13/20 1230    enoxaparin (LOVENOX) injection 40 mg  40 mg Subcutaneous Daily Judah Hathaway MD   40 mg at 12/13/20 2300    naloxone [facility]) injection 0.4 mg  0.4 mg Intravenous PRN Selestino Searing, PA        morphine PCA 1 mg/mL   Intravenous Continuous Selestino Searing, PA   New Bag at 12/13/20 2107    sennosides-docusate sodium (SENOKOT-S) 8.6-50 MG tablet 2 tablet  2 tablet Oral BID Selestino Searing, PA   2 tablet at 12/13/20 2247    polyethylene glycol (GLYCOLAX) packet 17 g  17 g Oral Daily Selestino Searing, PA   17 g at 12/13/20 2300    dexamethasone (DECADRON) injection 10 mg  10 mg Intravenous Daily with breakfast Selestino Searing, PA        pantoprazole (PROTONIX) injection 40 mg  40 mg Intravenous Daily Selestino Searing, PA   40 mg at 12/13/20 2246    And    sodium chloride (PF) 0.9 % injection 10 mL  10 mL Intravenous Daily Selestino Searing, PA   10 mL at 12/13/20 2247       No Known Allergies    Family History   Problem Relation Age of Onset    Other Mother     Depression Mother     High Blood Pressure Mother     Coronary Art Dis Father     High Blood Pressure Father     Mental Illness Sister     No Known Problems Brother     No Known Problems Brother     No Known Problems Sister        Social History     Socioeconomic History    Marital status:      Spouse name: Not on file    Number of children: Not on file    Years of education: Not on file    Highest education level: Not on file   Occupational History    Not on file   Social Needs    Financial resource strain: Not on file    Food insecurity     Worry: Not on file     Inability: Not on file    Transportation needs     Medical: Not on file     Non-medical: Not on file   Tobacco Use    Smoking status: Current Every Day Smoker     Packs/day: 1.50     Types: Cigarettes    Smokeless tobacco: Never Used   Substance and Sexual Activity    Alcohol use:  Yes     Alcohol/week: 72.0 standard drinks     Types: 70 Cans of beer, 2 Shots of liquor per week    Drug use: Yes     Types: IV, Cocaine     Comment: fentanyl, heroin    Sexual activity: Yes     Partners: Female   Lifestyle    Physical activity     Days per week: Not on file     Minutes per session: Not on file    Stress: Not on file   Relationships    Social connections     Talks on phone: Not on file     Gets together: Not on file     Attends Yarsanism service: Not on file     Active member of club or organization: Not on file     Attends meetings of clubs or organizations: Not on file     Relationship status: Not on file    Intimate partner violence     Fear of current or ex partner: Not on file     Emotionally abused: Not on file     Physically abused: Not on file     Forced sexual activity: Not on file   Other Topics Concern    Not on file   Social History Narrative    Not on file       ROS:   Review of Systems   General: Weight loss  Skin: No rashes or skin changes  HEENT: No epistaxis or dysphagia  Respiratory: No hemoptysis, positive for dry cough  Cardiac: No chest pain  GI: No nausea, vomiting, abdominal pain  : No dysuria, hematuria  MSK: Lower back pain  Neuro: No diplopia or syncope  Endo: No increased urination or thirst    Physical Exam:  Vitals:    12/13/20 2355   BP: 103/66   Pulse: 106   Resp: 16   Temp: 97.8 °F (36.6 °C)   SpO2: 95%       PSYCH: alert and oriented x 3  CONSTITUTIONAL: No apparent distress, comfortable  EYES: Sclera white, pupils equal round and reactive to light  ENMT:  Hearing normal, trachea midline, ears externally intact  LYMPH: no lympadenopathy in neck. No lympadenopathy in groins  RESP: Breath sounds were clear and equal with no rales, wheezes, or rhonchi. Respiratory effort was normal with no retractions or use of accessory muscles. CV: Heart sounds were normal with a regular rate and rhythm. No pedal edema  GI/ Abdomen: The abdomen was soft and non distended. There was no tenderness, guarding, rebound, or rigidity.    MSK: no clubbing/ no cyanosis        Assessment/Plan:  Erma Purcell is a 45 y/o male with metastatic lesions, unknown source but likely pulmonary, found to have a 2.4x3x3.2 cm diameter pancreatic head mass    - NPO for possible procedure  - Will obtain tumor markers  - PT-INR  - Will likely need a liver biopsy via IR  - Pain control per primary  - Overall care per primary  - Discussed with Dr. Melyssa Salcido    Electronically signed by Skip Clark MD on 12/14/2020 at 1:41 AM

## 2020-12-14 NOTE — H&P
[address]    Department of General Surgery - Adult  Surgical Service   Medical Student      CC: \"Back pain\"    HPI:  Pt reports back pain that has been worsening for the past 6 weeks. Pt was not in significant pain when examined due to effects of morphine. Reports loss of 28 lbs in past 6 weeks but states that it was intentional. Denies any fevers, chills, N/V, abdominal pain. Pt had trouble breathing while lying down 2-3 months ago for which his PCP prescribed two inhalers for him, one being a steroid. Pt was told CXR was normal at the time. Pt also reports some tenderness in his groin area where he previously had hernia repair surgery. Endorses smoking. PMH:   HTN, Degenerative joint disease, liver disease, tobacco use disorder, polysubstance abuse    PSH:  Hernia repair    Family History: Mother - Cancer, unknown type,    Maternal uncle - Cancer, unknown type    Allergies:  No known allergies    Medications:  Amlodipine 10 mg  Cavedilol 25 mg  Furosemide 40 mg  Hydralazine 75 mg    ROS:  General: + Weight loss, - Fever and chills  Skin: No rashes or skin changes  HEENT: No diplopia, no epistaxis, no sore throat  Respiratory: No hemoptysis, + Cough  Cardiac: No chest pain  GI: No N/V, no abdominal pain  Genitourinary: No dysuria, no urinary frequency  MSK: + Lower back pain  Neurologic: No syncope, no headaches  Endocrine: No increased thirst, no increased urination    Objective:  Vitals:   Blood pressure 103/66, pulse 106, temperature 97.8 °F (36.6 °C), temperature source Temporal, resp. rate 16, height 5' 9\" (1.753 m), weight 195 lb (88.5 kg), SpO2 95 %.     Gen: Well nourished, appears stated age, NAD  HEENT: NCAT, PERRLA  Neck: No LAD, no bruits  Resp: CTAB, decreased lung sounds in TANG  CV: Regular rate and rhythm   Abd: Soft, nontender  MSK: No tenderness on palpation of spine, likely 2/2 pain medications  Neuro: No focal deficits    Labs:  CBC:   Lab Results   Component Value Date WBC 14.4 12/13/2020    RBC 4.68 12/13/2020    HGB 14.6 12/13/2020    HCT 42.8 12/13/2020    MCV 91.5 12/13/2020    MCH 31.2 12/13/2020    MCHC 34.1 12/13/2020    RDW 13.0 12/13/2020     12/13/2020    MPV 9.1 12/13/2020     CMP:    Lab Results   Component Value Date     12/13/2020    K 4.0 12/13/2020     12/13/2020    CO2 22 12/13/2020    BUN 21 12/13/2020    CREATININE 1.0 12/13/2020    GFRAA >60 12/13/2020    LABGLOM >60 12/13/2020    GLUCOSE 85 12/13/2020    PROT 6.9 12/13/2020    LABALBU 3.8 12/13/2020    CALCIUM 10.6 12/13/2020    BILITOT 0.7 12/13/2020    ALKPHOS 71 12/13/2020    AST 14 12/13/2020    ALT 24 12/13/2020     Radiology:  XR FEMUR LEFT (MIN 2 VIEWS)   Final Result   1. Lytic lesion in the left femoral neck. No additional evidence of osseous   metastasis on this exam.  No evidence of pathologic fracture. 2.  Mild left hip joint osteoarthritis. MRI LUMBAR SPINE W WO CONTRAST   Final Result   1. Metastatic lesion in the L3 vertebral body, extending into the right   pedicle and superior articular facet. Pathological fracture of L3, with   approximately 60% loss of height. 2. Metastatic infiltration along the endplates L3 and L4. No pathological   fracture seen at these levels. 3. Infiltration of tumor into the epidural space at L3 resulting in [place], LATERAL RECESSES AND THE RIGHT NEURAL FORAMINA. 4. Large right paraspinal mass is contiguous with the bony metastatic lesions   and extends from the level of L2 to L5-S1. The mass exerts mass effect on   the psoas, which is laterally displaced. Areas of cystic degeneration are   seen right paraspinal mass at the level of L4 and L5.   5. Partially included in the field of view of this study is a small   metastatic lesion in the left iliac bone      MRI THORACIC SPINE W WO CONTRAST   Final Result   1. Metastatic lesions in the T8 and T12 vertebral bodies, more prominent in   the former. 2. Pathological fracture of the T8 vertebral body with approximately 40% loss   of height. 3. Epidural extension of tumor along the left lateral aspect at T8, extending   into the left T8-9 neural foramina. 4. Moderate central canal stenosis at T8.   5. No cord lesion or cord edema seen. XR EYE FOREIGN BODY   Final Result   No evidence of metallic foreign body within the orbits. CT ABDOMEN PELVIS W IV CONTRAST Additional Contrast? None   Final Result   Hypodense lesions in the right lobe of the liver, the larger measuring 3.5 cm   in diameter and was consistent with metastatic disease. Mildly heterogeneous mass extending cephalad from the head of the pancreas,   most suspicious for pancreatic malignancy measuring approximately 2.4 x 3 x   3.2 cm in diameter. Multiple lytic lesions as described involving the T8 vertebral body, T12   vertebral body, L3 vertebral body, and proximal left femur. Pathologic compression fracture of L3 and extensive extension of malignant   process to the paraspinal soft tissues anteriorly and to the right of L3, to   the adjacent intervertebral discs, and spinal canal.  Extension into the   right pedicle and right articular facet and large component extending to the   right iliopsoas muscle which is asymmetrically enlarged. Hypodense process   extending caudally along the course of the right psoas muscle with mild   peripheral enhancement, likely malignant involvement versus associated   abscess, felt to be a less likely consideration. Intraspinal extension of malignant process at the level of T8, and L3.      CT CHEST W CONTRAST   Final Result   Large heterogeneous left hilar mass as described consistent with malignancy. Occlusion of left upper lobe bronchus and severe bronchial narrowing to the   left lower lobe. Mildly spiculated nodular lesion, pleural based in the left lung apex likely   satellite malignant lesion measuring 1.5 cm.    Mediastinal adenopathy likely manifesting malignant involvement. Large metastatic lesion to the T8 vertebral body with paraspinal an   intraspinal extension of malignancy and pathologic compression fracture of T8.      CT LUMBAR SPINE WO CONTRAST   Final Result   1. Lytic destructive lesion along the right lateral aspect of the L3   vertebral body, involving the right pedicle, transverse process and superior   articular facet. 2. Pathological fracture on the right with loss of height by approximately   60%. The lesion may represent malignancy or an infectious process. Destructive changes in the discs characteristic of discitis/osteomyelitis are   not evident on this study. Further evaluation with pre and post contrast   enhanced MRI is recommended. 3. Soft tissue fullness extends from the L3 vertebral body into the adjacent   psoas muscle, which is landis than the contralateral side. The soft tissue   component may represent malignancy or infection. 4. Extent of epidural involvement of disease at L3 is not well delineated on   this study. Pre and post contrast enhanced MRI should clarify. 5. Variable degrees of degenerative changes, as described. NM BONE SCAN WHOLE BODY    (Results Pending)   MRI BRAIN W WO CONTRAST    (Results Pending)     Assessment and Plan:   1. Lung cancer with metastasis to thoracis and lumbar spine, pancreas, and liver  - Follow up with MRI  - Lipase and amylase for workup of pancreatic mass  - Pulm to evaluate for bronch and biopsy  - Neurosurgery no interventions  - Pain control  - Palliative medicine  - Mild hypercalcemia likely 2/2 bone metastasis    2. Hypertension  - Continue home medications    3.  Polysubstance abuse:  - Urine pos for opioids  - Naloxone

## 2020-12-14 NOTE — PROGRESS NOTES
Patient tolerated procedure well, a bit anxious,. Kiara Calderon RN with report. Specimen taken to lab.

## 2020-12-14 NOTE — PLAN OF CARE
Problem: Pain:  Goal: Pain level will decrease  Description: Pain level will decrease  12/14/2020 0152 by Debby Nash RN  Outcome: Met This Shift     Problem: Falls - Risk of:  Goal: Will remain free from falls  Description: Will remain free from falls  12/14/2020 0152 by Debby Nash RN  Outcome: Met This Shift     Problem: Falls - Risk of:  Goal: Absence of physical injury  Description: Absence of physical injury  12/14/2020 0152 by Debby Nash RN  Outcome: Met This Shift     Problem: Daily Care:  Goal: Daily care needs are met  Description: Daily care needs are met  Outcome: Met This Shift

## 2020-12-14 NOTE — PROGRESS NOTES
Talked with Kavon Smart. Pt Is currently in MRI. Pt can sign his own consent, pt will be placed back on morphine pain pump once he returns from  MRI. Pt /INR ordered stat. Pt is NPO. Lovenox must be held 1 day.

## 2020-12-14 NOTE — PROGRESS NOTES
TECHNOLOGIST PROVIDED HISTORY: Reason for exam:->Possible malignancy What reading provider will be dictating this exam?->CRC FINDINGS: Mediastinum: Large heterogeneous left hilar mass measuring approximately 5.4 x 4.1 x 3.7 cm consistent with malignancy. There is occlusion of left upper lobe bronchus and severe bronchial narrowing to the left lower lobe. Associated mediastinal adenopathy in the prevascular space, pretracheal retrocaval space, aortic or pulmonary window, consistent with metastatic involvement. Largest prevascular node measures approximately 2.1 cm in diameter. No aortic dissection or aneurysmal dilatation. No pericardial effusion. Lungs/pleura: Mildly spiculated nodular lesion, pleural based in the left lung apex which may represent satellite lesion measuring approximately 1.5 cm in diameter. Emphysematous changes in the lungs. No pleural effusions. Upper Abdomen: Hypodense hepatic lesions. See separate report of CT of the abdomen and pelvis. Soft Tissues/Bones: Large lytic lesion at the T8 vertebral body with paraspinal extension and intraspinal extension of malignancy. Pathologic compression deformity of T8. Large heterogeneous left hilar mass as described consistent with malignancy. Occlusion of left upper lobe bronchus and severe bronchial narrowing to the left lower lobe. Mildly spiculated nodular lesion, pleural based in the left lung apex likely satellite malignant lesion measuring 1.5 cm. Mediastinal adenopathy likely manifesting malignant involvement. Large metastatic lesion to the T8 vertebral body with paraspinal an intraspinal extension of malignancy and pathologic compression fracture of T8. Ct Lumbar Spine Wo Contrast    Result Date: 12/12/2020  EXAMINATION: CT OF THE LUMBAR SPINE WITHOUT CONTRAST  12/12/2020 TECHNIQUE: CT of the lumbar spine was performed without the administration of intravenous contrast. Multiplanar reformatted images are provided for review.  Dose modulation, iterative reconstruction, and/or weight based adjustment of the mA/kV was utilized to reduce the radiation dose to as low as reasonably achievable. COMPARISON: None HISTORY: ORDERING SYSTEM PROVIDED HISTORY: pain TECHNOLOGIST PROVIDED HISTORY: Reason for exam:->pain What reading provider will be dictating this exam?->CRC FINDINGS: BONES/ALIGNMENT: There are lytic destructive changes in the L3 vertebral body, more so on the right. Lytic lesion extends to the right pedicle, transverse process and superior articular facet. There is pathological fracture involving the right lateral aspect of the L3 vertebral body, with approximately 60% loss of height. Soft tissue fullness extends into the adjacent psoas muscle. The extent of epidural involvement is unclear on this study. DEGENERATIVE CHANGES: L1-2: Unremarkable. L2-3: Small disc bulge. Mild facet hypertrophy. Mild central canal, lateral recess and neural foraminal stenoses. L3-4: Destructive changes in the right L3 vertebral body as described. Extent of epidural involvement and therefore the extent of central canal stenosis is not well delineated on this study. The lesion likely extends into the right neural foramina. Moderate stenosis of the left neural foramina. L4-5: Prominent loss of disc height with small disc bulge. Mild facet and ligamentous hypertrophy. Mild-to-moderate central canal and lateral recess stenoses. Moderate neural foraminal stenoses. L5-S1: Minimal disc bulge. Mild facet hypertrophy. No significant central canal or lateral recess stenosis. Mild to moderate neural foraminal stenoses. SOFT TISSUES/RETROPERITONEUM: Soft tissue fullness in the right psoas muscle, extending from the L3 level inferiorly. 1. Lytic destructive lesion along the right lateral aspect of the L3 vertebral body, involving the right pedicle, transverse process and superior articular facet.  2. Pathological fracture on the right with loss of height by approximately 60%. The lesion may represent malignancy or an infectious process. Destructive changes in the discs characteristic of discitis/osteomyelitis are not evident on this study. Further evaluation with pre and post contrast enhanced MRI is recommended. 3. Soft tissue fullness extends from the L3 vertebral body into the adjacent psoas muscle, which is landis than the contralateral side. The soft tissue component may represent malignancy or infection. 4. Extent of epidural involvement of disease at L3 is not well delineated on this study. Pre and post contrast enhanced MRI should clarify. 5. Variable degrees of degenerative changes, as described. Mri Thoracic Spine W Wo Contrast    Result Date: 12/13/2020  EXAMINATION: MRI OF THE THORACIC SPINE WITHOUT AND WITH CONTRAST  12/13/2020 2:51 pm TECHNIQUE: Multiplanar multisequence MRI of the thoracic spine was performed without and with the administration of intravenous contrast. COMPARISON: None HISTORY: ORDERING SYSTEM PROVIDED HISTORY: concern for malignancy TECHNOLOGIST PROVIDED HISTORY: Reason for exam:->concern for malignancy What reading provider will be dictating this exam?->CRC FINDINGS: BONES/ALIGNMENT: Enhancing metastatic lesions are seen in the T8 and T12 vertebral bodies. There is a pathological compression fracture deformity in the T8 vertebral body with approximately 40% loss of height. There is mild retropulsion of the fracture fragments, more so towards the left. Metastatic lesion extends into the left pedicle and left superior articular facet. At the level of the epidural tumor infiltration, the central canal is moderately stenotic. The lesion along the left lateral aspect of the T12 vertebral body demonstrates no associated compression fracture or extraosseous spread. SPINAL CORD: No abnormal cord signal is seen.  SOFT TISSUES:  Extraosseous spread of the tumor along the left anterior epidural space at T8 and extends into the left T8-9 neural foramina. DEGENERATIVE CHANGES: No significant spinal canal stenosis or neural foraminal narrowing of the thoracic spine. 1. Metastatic lesions in the T8 and T12 vertebral bodies, more prominent in the former. 2. Pathological fracture of the T8 vertebral body with approximately 40% loss of height. 3. Epidural extension of tumor along the left lateral aspect at T8, extending into the left T8-9 neural foramina. 4. Moderate central canal stenosis at T8. 5. No cord lesion or cord edema seen. Mri Lumbar Spine W Wo Contrast    Result Date: 12/13/2020  EXAMINATION: MRI OF THE LUMBAR SPINE WITHOUT AND WITH CONTRAST  12/13/2020 2:51 pm TECHNIQUE: Multiplanar multisequence MRI of the lumbar spine was performed without and with the administration of intravenous contrast. COMPARISON: CT of the lumbar spine, 12/12/2020 HISTORY: ORDERING SYSTEM PROVIDED HISTORY: metastatic lesions TECHNOLOGIST PROVIDED HISTORY: Reason for exam:->metastatic lesions What reading provider will be dictating this exam?->CRC FINDINGS: BONES/ALIGNMENT: There is a metastatic lesion in the L3 vertebral body with approximately 60% loss of height. There is mild metastatic infiltration along the superior endplate of the L4 vertebral body on the right, as well as the inferior aspect of the L2 vertebral body on the left. No pathological fractures are seen in the L3 and L4 vertebral bodies. SPINAL CORD:  The conus terminates normally. L1-L2: No mass or disc herniation. No significant central canal, lateral recess or neural foraminal stenoses. L2-L3: Tumor infiltration is seen into the epidural space, extending into the right neural foramina, resulting in invasion and destruction of the right pedicle and superior articular facet. Tumor results in severe stenoses of the central canal, lateral recesses and right neural foramina. Tumor extends into the right lateral paraspinal space, resulting in displacement of the psoas muscle laterally.   The enhancing tumor extends in the right paraspinal space from the level of L2 to the level of L5-S1. Along the inferior margin of the extraosseous tumor at the level of L4 and L5, there are multiple areas of cavitation within the mass. The largest cyst which cavity measures approximately 3.6 x 3.1 cm in the maximal axial plane. L3-L4: Minimal disc bulge is seen at this level. The central canal is developmentally stenotic Moderate central canal stenosis, with narrowing of the AP diameter of the thecal sac in the midsagittal plane to 7 mm. Moderate to severe lateral recess stenosis tumor infiltration in the right neural foramina resulting in severe stenosis. Moderate to severe left neural foraminal stenosis. L4-L5: No tumor is seen at this level. Prominent loss of disc height with small disc bulge. Mild facet and ligamentous hypertrophy. Mild central canal and lateral recess stenoses. Moderate neural foraminal stenoses. L5-S1: Minimal disc bulge. No tumor is seen at this level. Mild central canal stenosis. No significant central canal or lateral recess stenosis. Mild to moderate neural foraminal stenoses. MISCELLANEOUS: Partially included in the field of view of this study is a small metastatic lesion in the left iliac bone. 1. Metastatic lesion in the L3 vertebral body, extending into the right pedicle and superior articular facet. Pathological fracture of L3, with approximately 60% loss of height. 2. Metastatic infiltration along the endplates L3 and L4. No pathological fracture seen at these levels. 3. Infiltration of tumor into the epidural space at L3 resulting in [address], LATERAL RECESSES AND THE RIGHT NEURAL FORAMINA. 4. Large right paraspinal mass is contiguous with the bony metastatic lesions and extends from the level of L2 to L5-S1. The mass exerts mass effect on the psoas, which is laterally displaced.   Areas of cystic degeneration are seen right paraspinal mass at the level of L4 and L5. 5. Partially included in the field of view of this study is a small metastatic lesion in the left iliac bone    Ct Abdomen Pelvis W Iv Contrast Additional Contrast? None    Result Date: 12/12/2020  EXAMINATION: CT OF THE ABDOMEN AND PELVIS WITH CONTRAST 12/12/2020 9:36 pm TECHNIQUE: CT of the abdomen and pelvis was performed with the administration of intravenous contrast. Multiplanar reformatted images are provided for review. Dose modulation, iterative reconstruction, and/or weight based adjustment of the mA/kV was utilized to reduce the radiation dose to as low as reasonably achievable. COMPARISON: Correlation is made with the prior CT of the lumbar spine HISTORY: ORDERING SYSTEM PROVIDED HISTORY: r/o Psoas abscess TECHNOLOGIST PROVIDED HISTORY: Additional Contrast?->None Reason for exam:->r/o Psoas abscess What reading provider will be dictating this exam?->CRC FINDINGS: Lower Chest: The lung bases are clear. Organs: Hypodense lesion in the right lobe of the liver measuring approximately 3.5 cm in diameter with mild peripheral enhancement. Smaller hypodense lesion in the right lobe of the liver inferiorly measuring 1 cm in diameter. Findings most consistent with metastatic disease. The spleen, adrenals, are within normal limits. Mildly heterogeneous mass extending cephalad from the head of the pancreas, most consistent with pancreatic malignancy measuring approximately 2.4 x 3 x 3.2 cm in diameter. The pancreatic duct is not dilated. Adrenals and gallbladder within normal limits. Kidneys enhance homogeneously with no obstructive uropathy. GI/Bowel: No evidence of bowel obstruction or free intraperitoneal air. No colitis or diverticulitis. Normal appendix. Pelvis: Moderately distended urinary bladder. No free fluid in the pelvis. No inguinal hernia. Peritoneum/Retroperitoneum: No abdominal aortic aneurysm.  Bones/Soft Tissues: Lytic lesion involving the T8 vertebral body and extending to the left paraspinal space, and to the spinal canal likely impinging on the thoracic cord at this level. The lesion measures approximately 3.8 cm in diameter. Smaller lytic lesion in the T12 vertebral body measuring approximately 1.4 cm in diameter. Larger lesion at the L3 vertebral body with pathologic compression deformity of L3. The lesion extends to the paraspinal soft tissues anteriorly and to the right of L3 and extends into the spinal canal at this level. There is extension to the L2-L3 intervertebral disc space, and to the L3-L4 intervertebral disc space. There is involvement of the right pedicle and right articular facet of L3. Large component extending to the right iliopsoas muscle which is asymmetrically enlarged consistent with malignant involvement. This process extends caudally along the course of the right psoas muscle with hypodense component, with mild peripheral enhancement consistent with malignant involvement, versus associated psoas abscess, felt to be a less likely consideration, measuring approximately 8.1 cm in cephalocaudal dimension. Additional lytic lesion involving the proximal left femur extending to the femoral neck with associated soft tissue component measuring approximately 4.3 cm in diameter. Hypodense lesions in the right lobe of the liver, the larger measuring 3.5 cm in diameter and was consistent with metastatic disease. Mildly heterogeneous mass extending cephalad from the head of the pancreas, most suspicious for pancreatic malignancy measuring approximately 2.4 x 3 x 3.2 cm in diameter. Multiple lytic lesions as described involving the T8 vertebral body, T12 vertebral body, L3 vertebral body, and proximal left femur.  Pathologic compression fracture of L3 and extensive extension of malignant process to the paraspinal soft tissues anteriorly and to the right of L3, to the adjacent intervertebral discs, and spinal canal.  Extension into the right pedicle and right articular facet and large component extending to the right iliopsoas muscle which is asymmetrically enlarged. Hypodense process extending caudally along the course of the right psoas muscle with mild peripheral enhancement, likely malignant involvement versus associated abscess, felt to be a less likely consideration. Intraspinal extension of malignant process at the level of T8, and L3. Xr Eye Foreign Body    Result Date: 12/13/2020  EXAMINATION: TWO XRAY VIEWS OF THE ORBITS 12/13/2020 COMPARISON: None. HISTORY: ORDERING SYSTEM PROVIDED HISTORY: Foreign body, eye, unspecified laterality, initial encounter TECHNOLOGIST PROVIDED HISTORY: Reason for exam:->Foreign body, eye, unspecified laterality, initial encounter What reading provider will be dictating this exam?->CRC FINDINGS: No evidence of radiopaque foreign body within the orbits. No other acute abnormalities noted. No evidence of metallic foreign body within the orbits.     CBC:   Lab Results   Component Value Date    WBC 11.1 12/14/2020    RBC 4.53 12/14/2020    HGB 14.3 12/14/2020    HCT 41.7 12/14/2020    MCV 92.1 12/14/2020    MCH 31.6 12/14/2020    MCHC 34.3 12/14/2020    RDW 12.8 12/14/2020     12/14/2020    MPV 9.4 12/14/2020     BMP:    Lab Results   Component Value Date     12/14/2020    K 4.4 12/14/2020    CL 94 12/14/2020    CO2 27 12/14/2020    BUN 33 12/14/2020    LABALBU 3.8 12/14/2020    CREATININE 1.1 12/14/2020    CALCIUM 10.3 12/14/2020    GFRAA >60 12/14/2020    LABGLOM >60 12/14/2020    GLUCOSE 132 12/14/2020      amLODIPine  10 mg Oral Daily    carvedilol  25 mg Oral BID WC    folic acid  1 mg Oral Daily    furosemide  40 mg Oral Daily    gabapentin  200 mg Oral TID    hydrALAZINE  75 mg Oral 3 times per day    mirtazapine  15 mg Oral Nightly    enoxaparin  40 mg Subcutaneous Daily    sennosides-docusate sodium  2 tablet Oral BID    polyethylene glycol  17 g Oral Daily    dexamethasone  10 mg Intravenous Daily with breakfast    pantoprazole  40 mg Intravenous Daily    And    sodium chloride (PF)  10 mL Intravenous Daily     Remains neuro intact, patient got out of bed by himself and I reinforced that he needs to wear the brace when oob  Assessment:  Patient Active Problem List   Diagnosis    Hyperkalemia    Non-traumatic rhabdomyolysis    Heroin abuse ([address]    Cocaine abuse ([address]    Opiate overdose ([address]    Acute respiratory failure with hypoxia ([address]    Drug overdose    Traumatic rhabdomyolysis ([address]    Severe episode of recurrent major depressive disorder, without psychotic features ([address]    Acute renal failure ([address]    Leukocytosis    Transaminitis    Severe single current episode of major depressive disorder, without psychotic features ([address]    Pathologic lumbar vertebral fracture, initial encounter    Malignant neoplasm metastatic to bone [facility])     Plan: For liver biopsy, brace when Ester Aquino M.D.

## 2020-12-14 NOTE — PROGRESS NOTES
Palliative Care Department  [phone]  Palliative Care Initial Consult  Provider María Elena Yeh PA-C    Kaitlynn Sandoval  [phone]  Hospital Day: 3    Referring Provider: Josie Cesar MD  Palliative Medicine was consulted for assistance with: Symptom management, goals of care, CODE STATUS and family support    Brief summary:  Kaitlynn Sandoval is a 44 y.o. who presented to the hospital with chief complaints of back pain and was admitted 12/12/2020 with diagnosis(ses) of malignant neoplasm metastatic to bone, lung mass, pathologic fracture of vertebrae due to neoplastic disease. Patient has started workup for malignancy. ASSESSMENT/PLAN:   Pertinent hospital diagnoses:  1. Metastatic cancer- unknown primary areas involved bone, pancreas, lung, liver, lymph nodes  - workup/multuiple consults await results     2.  Pain associated with neoplasm  - Increase PCA morphine from 1 to 2  mg/hour and 2 mg every 15 mins PRN  - dexamethasone 10 mg IV in am x 3 days  -protonix IV prophylaxis  -  EKG screening for possible use of methadone     3.  GI  - senna-s 2 tabs bid  - miralax 17 gm daily (make PRN if loose BM)    PALLIATIVE CARE ENCOUNTER  - Capacity: At this time, Kaitlynn Sandoval, Does have capacity for medical decision-making. Capacity is time limited and situation/question specific  - Outcome of goals of care meeting: adjustment of pain medication  - Code Status:   full  - Advanced directives: None  - Surrogate decision maker/Legal NOK:     Contacts: Lillian Gonzales [phone]  - Spiritual assessment: none  - Bereavement and grief: diagnosis and young age  - DISPO: workup for malignancy, pain control    Referrals to: none today    Subjective   Chart reviewed  Patient currently undergoing work-up for malignancy which is included consults to hepatobiliary and pancreatic surgery, hematology/oncology, neurosurgery, pulmonology and interventional radiology.     PCA was started yesterday evening and patient was able to sleep throughout the night. He had difficulties with it this morning, he reports it was not working however believe it was discontinued when he had to have test completed. He seems somewhat confused in use of the button however he did utilize it overnight. He has some pain relief but continued pain with movement  I advised him to premedicate himself with an as needed dose of morphine when he is going to engage in activity that will exacerbate his pain to see if it is helps control the pain. I did advise we will need him to be up and moving and need to have that level of pain controlled so he is able to participate in therapy and be as independent as possible. He has not had a bowel movement. He denies nausea vomiting. He is very hungry however he is diets held today due to procedures  He is very aggravated and irritated today. Inpatient medications reviewed: yes  Home Medications reviewed: yes    OBJECTIVE:   Prognosis: Poor    Physical Exam:  /77   Pulse 88   Temp 98.3 °F (36.8 °C) (Temporal)   Resp 22   Ht 5' 9\" (1.753 m)   Wt 195 lb (88.5 kg)   SpO2 98%   BMI 28.80 kg/m²   Gen:  Appears stated age, irritable today with distress at times when moving alert  HEENT:  Normocephalic, atraumatic, mucosa dry, PERRLA, EOMI, sclera anicteric  Neck:  Supple, trachea midline, no JVD  Lungs:  respirations unlabored  Heart[de-identified]  HRRR  Abd:  Soft, non tender, non distended, bowel sounds present  :  No rogers  Ext:  Guarded movement of right leg to avoid eliciting pain, no edema, pulses present  Skin:  Warm and dry without rash, bruising, petechiae  Neuro:  Alert, oriented x 3; following commands    Objective data reviewed: labs, images, records, medication use, vitals and chart    Time/Communication  Greater than 50% of time spent, total 45 minutes in counseling and coordination of care at the bedside/over the telephone regarding symptom management and see above.     Thank you for allowing Palliative Medicine to participate in the care of Landis Paget. Provider Anjum Miles PA-C  Palliative Medicine    Note: This report was completed using computerize voiced recognition software. Every effort has been made to ensure accuracy; however, inadvertent computerized transcription errors may be present.

## 2020-12-15 NOTE — PROGRESS NOTES
Palliative Care Department  [phone]  Palliative Care Progress Note  Provider Mickey Michaud  [id number]  Hospital Day: 4    Referring Provider: Cecille Young MD  Palliative Medicine was consulted for assistance with: Symptom management, goals of care, CODE STATUS and family support    Brief summary:  Jeanna Lozada is a 44 y.o. who presented to the hospital with chief complaints of back pain and was admitted 12/12/2020 with diagnosis(ses) of malignant neoplasm metastatic to bone, lung mass, pathologic fracture of vertebrae due to neoplastic disease. Patient has started workup for malignancy. ASSESSMENT/PLAN:   Pertinent hospital diagnoses:  1. Metastatic cancer- unknown primary areas involved bone, pancreas, lung, liver, lymph nodes  - workup/multuiple consults await results  - s/p liver biopsy 12/14  -   - EBUS scheduled for 12/16 or 12/17 unless liver biopsy results are positive prior to that     2.  Pain associated with neoplasm  -  PCA morphine 2 mg every 15 mins PRN  - started methadone 5mg q8 hours  - dexamethasone 10 mg IV in am x 3 days  - protonix IV prophylaxis  -  EKG screening normal QTc, ok for methadone  - increased neurontin to 400mg TID     3.  GI  - senna-s 2 tabs bid  - miralax 17 gm daily (make PRN if loose BM)    PALLIATIVE CARE ENCOUNTER  - Capacity: At this time, Jeanna Lozada, Does have capacity for medical decision-making.   Capacity is time limited and situation/question specific  - Outcome of goals of care meeting: adjustment of pain medication  - Code Status:   full  - Advanced directives: None  - Surrogate decision maker/Legal NOK:     Contacts: Negra Paez [phone]  - Spiritual assessment: none  - Bereavement and grief: diagnosis and young age  - DISPO: workup for malignancy, pain control    Referrals to: none today    Subjective   Chart reviewed  Patient currently undergoing work-up for malignancy which is included consults to hepatobiliary and pancreatic surgery, hematology/oncology, neurosurgery, pulmonology and interventional radiology. Rates pain 12/10 left hip and lower abdomen, and 28/10 lower back going down the right leg described as a burning pain shooting down to the kneecap. Patient states his PCA is no longer set up to give demand dosing like it was overnight, spoke with floor nurse regarding PCA settings and nurse states she will check. Patient tearful at times, emotional support provided. Inpatient medications reviewed: yes  Home Medications reviewed: yes    OBJECTIVE:   Prognosis: Poor    Physical Exam:  /84   Pulse 97   Temp 98.1 °F (36.7 °C) (Temporal)   Resp 20   Ht 5' 9\" (1.753 m)   Wt 195 lb (88.5 kg)   SpO2 96%   BMI 28.80 kg/m²   Gen:  Appears stated age, restless  HEENT:  Normocephalic, atraumatic, mucosa dry, PERRLA, EOMI, sclera anicteric  Neck:  Supple, trachea midline, no JVD  Lungs:  respirations unlabored, clear to auscultation bilaterally  Heart[de-identified]  normal S1S2, regular rate and rhythm  Abd:  Soft, non tender, non distended, bowel sounds present  :  No rogers  Ext:  moving all extremities  Skin:  Warm and dry without rash, bruising, petechiae, scar under left scapula  Neuro:  Alert, oriented x 3; following commands  Psych: anxious, tearful at times    Objective data reviewed: labs, images, records, medication use, vitals and chart    Time/Communication  Greater than 50% of time spent, total 35 minutes in counseling and coordination of care at the bedside/over the telephone regarding symptom management and see above. Thank you for allowing Palliative Medicine to participate in the care of Ferne Goltz.       Provider [facility]

## 2020-12-15 NOTE — PROGRESS NOTES
[facility] SURGERY  DAILY PROGRESS NOTE  12/15/2020     CC: Metastatic Cancer    Subjective:  Underwent IR liver biopsy yesterday. Tolerating the pain well on Morphine PCA. Still no abdominal pain. No nausea or vomiting. Having leg pain, urinating well, no BM. Objective:  /80   Pulse 99   Temp 96.3 °F (35.7 °C) (Temporal)   Resp 20   Ht 5' 9\" (1.753 m)   Wt 195 lb (88.5 kg)   SpO2 96%   BMI 28.80 kg/m²     General: No apparent distress, comfortable  EYES: Sclera white, pupils equal round and reactive to lightt  RESP: on 2 L NC, no acute respiratory distress  CV: RR  GI/ Abdomen: Biopsy site on RUQ with dressing clean and without strike-through. Abdomen soft and non distended. No tenderness, guarding, rebound, or rigidity. MSK: no clubbing/ no cyanosis    Assessment/Plan:  44 y.o. male with metastatic cancer to the liver    -IR biopsy of liver performed on 12/14, results pending  -  -EBUS pending for Wednesday or Thursday, if Liver biopsy results return positive it may be cancelled per Dr. Husam Donis  -OK for General Diet  -Follow up other tumor markers, still pending    Note signed electronically by Yovanny BRITO at 6:49 AM on 12/15/2020    Currently on PCA  Palliative care following - pain control will be an issue - however he does have metastatic cancer  Await pathology results  Okay for diet    Electronically signed by Dano Clark MD on 12/15/2020 at 9:58 AM

## 2020-12-15 NOTE — PROGRESS NOTES
Oscar Hernandes M.D.,[facility]  Ashish Tsai D.O., FFELICITYOEmeritaI., Jose Ventura M.D. Rc Lentz M.D., Tuyet Ceron M.D. Denton Felty, D.O. Daily Pulmonary Progress Note    Patient:  Tacho Abarca 44 y.o. male MRN: [id number]     Date of Service: 12/15/2020      Synopsis     We are following patient for lung mass with mets to liver and bone    \"CC\" back pain     Code status:prior      Subjective      Patient was seen and examined. Patient is sitting at side of bed he reports having no shortness of breath or cough he does tell me that he did have inhalers from his senior living doctor he reports he thinks one was a steroid and one was a bronchodilator I will order bronchodilator for d/c. Patient reports he would use it occasionally over the last month since his release from senior living. Complaining of continued back pain, does report slight improvement with PCA pump palliative medicine following and managing pain. Review of Systems:  Constitutional: Denies fever, weight loss, night sweats, and fatigue  Skin: Denies pigmentation, dark lesions, and rashes   HEENT: Denies hearing loss, tinnitus, ear drainage, epistaxis, sore throat, and hoarseness. Cardiovascular: Denies palpitations, chest pain, and chest pressure. Respiratory: Denies cough, dyspnea at rest and with any to believe, hemoptysis, apnea, and choking.   Gastrointestinal: Denies nausea, vomiting, poor appetite, diarrhea, heartburn or reflux  Genitourinary: Denies dysuria, frequency, urgency or hematuria  Musculoskeletal: Denies myalgias, muscle weakness, and +bone pain, +back pain     24-hour events:  Post liver biopsy    Objective   Vitals: /84   Pulse 97   Temp 98.1 °F (36.7 °C) (Temporal)   Resp 20   Ht 5' 9\" (1.753 m)   Wt 195 lb (88.5 kg)   SpO2 96%   BMI 28.80 kg/m²     I/O:    Intake/Output Summary (Last 24 hours) at 12/15/2020 1217  Last data filed at 12/15/2020 0913  Gross per 24 hour   Intake 485.9 ml MDP.   Tracer uptake is seen compatible with degenerative changes   There is increased tracer uptake identified involving the left femoral   neck. There is increased tracer uptake involving the sternum. There is   focal tracer uptake seen at the level of T8 on the right. There is   focal tracer uptake seen at the level of approximately L3 on the left. There is faint tracer uptake seen at the level of T12. There is   increased tracer uptake seen at the level of the sacrum. This may   represent incompletely blocked bladder tracer as the bladder appears   to be severely distended on previous studies   The remaining biodistribution of radiotracer appears to be within   normal limits   Impression:     Findings compatible with degenerative changes   Multiple foci of abnormal tracer uptake, consistent widespread   metastatic disease. ECHO      Labs:  Lab Results   Component Value Date    WBC 17.3 12/15/2020    HGB 13.7 12/15/2020    HCT 39.7 12/15/2020    MCV 91.1 12/15/2020    MCH 31.4 12/15/2020    MCHC 34.5 12/15/2020    RDW 12.5 12/15/2020     12/15/2020    MPV 9.3 12/15/2020     Lab Results   Component Value Date     12/15/2020    K 3.7 12/15/2020    CL 94 12/15/2020    CO2 28 12/15/2020    BUN 41 12/15/2020    CREATININE 1.1 12/15/2020    LABALBU 3.8 12/15/2020    CALCIUM 9.9 12/15/2020    GFRAA >60 12/15/2020    LABGLOM >60 12/15/2020     Lab Results   Component Value Date    PROTIME 12.7 12/14/2020    INR 1.1 12/14/2020     No results for input(s): PROBNP in the last 72 hours. No results for input(s): PROCAL in the last 72 hours. This SmartLink has not been configured with any valid records. Micro:  No results for input(s): CULTRESP in the last 72 hours. No results for input(s): LABGRAM in the last 72 hours. No results for input(s): LEGUR in the last 72 hours. No results for input(s): STREPNEUMAGU in the last 72 hours. No results for input(s): LP1UAG in the last 72 hours. Assessment:    1. Lung cancer with metastasis to thoracis and lumbar spine,2.4x3x3.2 cm diameter  pancreas, and liver  2. Polysubstance abuse , hx of overdose 2017  3. Major depressive disorder   4. Nicotine dependence       Plan:   1. Keep pulse oximetry >92% , currenlty on RA 98%  2. palliative medicine following managing morphine PCA  3. IR liver biopsy 12/14, await pathology, if inconclusive we will schedule him for bronchoscopy with EBUS tentatively 1216 at noon  4. Decadron daily   5. Oncology following -bone scan with multiple metastatic lesions widespread         This plan of care was reviewed in collaboration with Dr. Lino Hernandez  Electronically signed by STEVE Mcfarland on 12/15/2020 at 12:17 PM    I personally saw, examined, and cared for the patient. Labs, medications, radiographs reviewed. I agree with history exam and plans detailed in NP note.   Po Francis MD

## 2020-12-15 NOTE — PROGRESS NOTES
Hospitalist Progress Note      Synopsis: Patient admitted on 12/12/2020     Subjective    Patient presented to the ER with back pain. Pain was significant with radiation. Investigations in the ER showed a lung mass and metastatic areas in the thoracic and lumbar spine which are now confirmed. He just came back from his MRI  December 14, 2020  Feeling better. Status biopsy of liver  Exam:  /82   Pulse 82   Temp 97.8 °F (36.6 °C) (Temporal)   Resp 22   Ht 5' 9\" (1.753 m)   Wt 195 lb (88.5 kg)   SpO2 99%   BMI 28.80 kg/m²   General appearance: No apparent distress, appears stated age and cooperative. HEENT: Pupils equal, round, and reactive to light. Conjunctivae/corneas clear. Neck: Supple. No jugular venous distention. Trachea midline. Respiratory:  Normal respiratory effort. Clear to auscultation, bilaterally without Rales/Wheezes/Rhonchi. Cardiovascular: Regular rate and rhythm with normal S1/S2 without murmurs, rubs or gallops. Abdomen: Soft, non-tender, non-distended with normal bowel sounds. Musculoskeletal: No clubbing, cyanosis or edema bilaterally. Brisk capillary refill. 2+ lower extremity pulses (dorsalis pedis).    Skin:  No rashes    Neurologic: awake, alert and following commands   Right-sided abdomen with postop dressings in place    Medications:  Reviewed    Infusion Medications    morphine       Scheduled Medications    amLODIPine  10 mg Oral Daily    carvedilol  25 mg Oral BID WC    folic acid  1 mg Oral Daily    furosemide  40 mg Oral Daily    gabapentin  200 mg Oral TID    hydrALAZINE  75 mg Oral 3 times per day    mirtazapine  15 mg Oral Nightly    enoxaparin  40 mg Subcutaneous Daily    sennosides-docusate sodium  2 tablet Oral BID    polyethylene glycol  17 g Oral Daily    dexamethasone  10 mg Intravenous Daily with breakfast    pantoprazole  40 mg Intravenous Daily    And    sodium chloride (PF)  10 mL Intravenous Daily     PRN Meds: gadobenate dimeglumine, bisacodyl, oxyCODONE **OR** oxyCODONE, acetaminophen, sodium chloride flush, naloxone    I/O    Intake/Output Summary (Last 24 hours) at 12/14/2020 1920  Last data filed at 12/14/2020 1159  Gross per 24 hour   Intake 180 ml   Output 700 ml   Net -520 ml       Labs:   Recent Labs     12/13/20 0535 12/14/20  0600   WBC 14.4* 11.1   HGB 14.6 14.3   HCT 42.8 41.7    260       Recent Labs     12/12/20 2033 12/13/20  0535 12/14/20  0600    136 132   K 4.2 4.0 4.4   CL 99 100 94*   CO2 27 22 27   BUN 20 21* 33*   CREATININE 1.2 1.0 1.1   CALCIUM 10.8* 10.6* 10.3*       Recent Labs     12/13/20 0535 12/14/20  0600   PROT 6.9 7.3   ALKPHOS 71 68   ALT 24 19   AST 14 13   BILITOT 0.7 0.6       Recent Labs     12/14/20  0600   INR 1.1       No results for input(s): Cleo Balbuena in the last 72 hours. Chronic labs:  Lab Results   Component Value Date    INR 1.1 12/14/2020       Radiology:  Ct Chest W Contrast    Result Date: 12/12/2020  EXAMINATION: CT OF THE CHEST WITH CONTRAST 12/12/2020 9:36 pm TECHNIQUE: CT of the chest was performed with the administration of intravenous contrast. Multiplanar reformatted images are provided for review. Dose modulation, iterative reconstruction, and/or weight based adjustment of the mA/kV was utilized to reduce the radiation dose to as low as reasonably achievable. COMPARISON: None. HISTORY: ORDERING SYSTEM PROVIDED HISTORY: Possible malignancy TECHNOLOGIST PROVIDED HISTORY: Reason for exam:->Possible malignancy What reading provider will be dictating this exam?->CRC FINDINGS: Mediastinum: Large heterogeneous left hilar mass measuring approximately 5.4 x 4.1 x 3.7 cm consistent with malignancy. There is occlusion of left upper lobe bronchus and severe bronchial narrowing to the left lower lobe. Associated mediastinal adenopathy in the prevascular space, pretracheal retrocaval space, aortic or pulmonary window, consistent with metastatic involvement. Largest prevascular node measures approximately 2.1 cm in diameter. No aortic dissection or aneurysmal dilatation. No pericardial effusion. Lungs/pleura: Mildly spiculated nodular lesion, pleural based in the left lung apex which may represent satellite lesion measuring approximately 1.5 cm in diameter. Emphysematous changes in the lungs. No pleural effusions. Upper Abdomen: Hypodense hepatic lesions. See separate report of CT of the abdomen and pelvis. Soft Tissues/Bones: Large lytic lesion at the T8 vertebral body with paraspinal extension and intraspinal extension of malignancy. Pathologic compression deformity of T8. Large heterogeneous left hilar mass as described consistent with malignancy. Occlusion of left upper lobe bronchus and severe bronchial narrowing to the left lower lobe. Mildly spiculated nodular lesion, pleural based in the left lung apex likely satellite malignant lesion measuring 1.5 cm. Mediastinal adenopathy likely manifesting malignant involvement. Large metastatic lesion to the T8 vertebral body with paraspinal an intraspinal extension of malignancy and pathologic compression fracture of T8. Ct Lumbar Spine Wo Contrast    Result Date: 12/12/2020  EXAMINATION: CT OF THE LUMBAR SPINE WITHOUT CONTRAST  12/12/2020 TECHNIQUE: CT of the lumbar spine was performed without the administration of intravenous contrast. Multiplanar reformatted images are provided for review. Dose modulation, iterative reconstruction, and/or weight based adjustment of the mA/kV was utilized to reduce the radiation dose to as low as reasonably achievable. COMPARISON: None HISTORY: ORDERING SYSTEM PROVIDED HISTORY: pain TECHNOLOGIST PROVIDED HISTORY: Reason for exam:->pain What reading provider will be dictating this exam?->CRC FINDINGS: BONES/ALIGNMENT: There are lytic destructive changes in the L3 vertebral body, more so on the right.   Lytic lesion extends to the right pedicle, transverse process and superior articular facet. There is pathological fracture involving the right lateral aspect of the L3 vertebral body, with approximately 60% loss of height. Soft tissue fullness extends into the adjacent psoas muscle. The extent of epidural involvement is unclear on this study. DEGENERATIVE CHANGES: L1-2: Unremarkable. L2-3: Small disc bulge. Mild facet hypertrophy. Mild central canal, lateral recess and neural foraminal stenoses. L3-4: Destructive changes in the right L3 vertebral body as described. Extent of epidural involvement and therefore the extent of central canal stenosis is not well delineated on this study. The lesion likely extends into the right neural foramina. Moderate stenosis of the left neural foramina. L4-5: Prominent loss of disc height with small disc bulge. Mild facet and ligamentous hypertrophy. Mild-to-moderate central canal and lateral recess stenoses. Moderate neural foraminal stenoses. L5-S1: Minimal disc bulge. Mild facet hypertrophy. No significant central canal or lateral recess stenosis. Mild to moderate neural foraminal stenoses. SOFT TISSUES/RETROPERITONEUM: Soft tissue fullness in the right psoas muscle, extending from the L3 level inferiorly. 1. Lytic destructive lesion along the right lateral aspect of the L3 vertebral body, involving the right pedicle, transverse process and superior articular facet. 2. Pathological fracture on the right with loss of height by approximately 60%. The lesion may represent malignancy or an infectious process. Destructive changes in the discs characteristic of discitis/osteomyelitis are not evident on this study. Further evaluation with pre and post contrast enhanced MRI is recommended. 3. Soft tissue fullness extends from the L3 vertebral body into the adjacent psoas muscle, which is landis than the contralateral side. The soft tissue component may represent malignancy or infection.  4. Extent of epidural involvement of disease at L3 is not well delineated on this study. Pre and post contrast enhanced MRI should clarify. 5. Variable degrees of degenerative changes, as described. Ct Abdomen Pelvis W Iv Contrast Additional Contrast? None    Result Date: 12/12/2020  EXAMINATION: CT OF THE ABDOMEN AND PELVIS WITH CONTRAST 12/12/2020 9:36 pm TECHNIQUE: CT of the abdomen and pelvis was performed with the administration of intravenous contrast. Multiplanar reformatted images are provided for review. Dose modulation, iterative reconstruction, and/or weight based adjustment of the mA/kV was utilized to reduce the radiation dose to as low as reasonably achievable. COMPARISON: Correlation is made with the prior CT of the lumbar spine HISTORY: ORDERING SYSTEM PROVIDED HISTORY: r/o Psoas abscess TECHNOLOGIST PROVIDED HISTORY: Additional Contrast?->None Reason for exam:->r/o Psoas abscess What reading provider will be dictating this exam?->CRC FINDINGS: Lower Chest: The lung bases are clear. Organs: Hypodense lesion in the right lobe of the liver measuring approximately 3.5 cm in diameter with mild peripheral enhancement. Smaller hypodense lesion in the right lobe of the liver inferiorly measuring 1 cm in diameter. Findings most consistent with metastatic disease. The spleen, adrenals, are within normal limits. Mildly heterogeneous mass extending cephalad from the head of the pancreas, most consistent with pancreatic malignancy measuring approximately 2.4 x 3 x 3.2 cm in diameter. The pancreatic duct is not dilated. Adrenals and gallbladder within normal limits. Kidneys enhance homogeneously with no obstructive uropathy. GI/Bowel: No evidence of bowel obstruction or free intraperitoneal air. No colitis or diverticulitis. Normal appendix. Pelvis: Moderately distended urinary bladder. No free fluid in the pelvis. No inguinal hernia. Peritoneum/Retroperitoneum: No abdominal aortic aneurysm.  Bones/Soft Tissues: Lytic lesion involving the T8 vertebral body and extending to the left paraspinal space, and to the spinal canal likely impinging on the thoracic cord at this level. The lesion measures approximately 3.8 cm in diameter. Smaller lytic lesion in the T12 vertebral body measuring approximately 1.4 cm in diameter. Larger lesion at the L3 vertebral body with pathologic compression deformity of L3. The lesion extends to the paraspinal soft tissues anteriorly and to the right of L3 and extends into the spinal canal at this level. There is extension to the L2-L3 intervertebral disc space, and to the L3-L4 intervertebral disc space. There is involvement of the right pedicle and right articular facet of L3. Large component extending to the right iliopsoas muscle which is asymmetrically enlarged consistent with malignant involvement. This process extends caudally along the course of the right psoas muscle with hypodense component, with mild peripheral enhancement consistent with malignant involvement, versus associated psoas abscess, felt to be a less likely consideration, measuring approximately 8.1 cm in cephalocaudal dimension. Additional lytic lesion involving the proximal left femur extending to the femoral neck with associated soft tissue component measuring approximately 4.3 cm in diameter. Hypodense lesions in the right lobe of the liver, the larger measuring 3.5 cm in diameter and was consistent with metastatic disease. Mildly heterogeneous mass extending cephalad from the head of the pancreas, most suspicious for pancreatic malignancy measuring approximately 2.4 x 3 x 3.2 cm in diameter. Multiple lytic lesions as described involving the T8 vertebral body, T12 vertebral body, L3 vertebral body, and proximal left femur.  Pathologic compression fracture of L3 and extensive extension of malignant process to the paraspinal soft tissues anteriorly and to the right of L3, to the adjacent intervertebral discs, and spinal canal.  Extension into the right pedicle and (3-4 GM)  Code Status: Prior  PT/OT Eval Status:   Unable yet  DVT Prophylaxis:   Can place  Recommended disposition at discharge:  unsure yet    +++++++++++++++++++++++++++++++++++++++++++++++++  Farideh Reis MD   [facility].  +++++++++++++++++++++++++++++++++++++++++++++++++  NOTE: This report was transcribed using voice recognition software. Every effort was made to ensure accuracy; however, inadvertent computerized transcription errors may be present.

## 2020-12-15 NOTE — PROGRESS NOTES
Hematology/Oncology Inpatient f/up:      Reason for Visit:   Metastatic Disease. Subjective: The patient is feeling better, the back pain had improved, he is s/p liver biopsy. Physical Exam:  /82   Pulse 98   Temp 98.1 °F (36.7 °C) (Temporal)   Resp 20   Ht 5' 9\" (1.753 m)   Wt 195 lb (88.5 kg)   SpO2 96%   BMI 28.80 kg/m²   GENERAL: Alert, oriented x 3, not in acute distress. HEENT: PERRLA; EOMI. Oropharynx clear. NECK: Supple. No palpable cervical or supraclavicular lymphadenopathy. LUNGS: Decreased air entry bilaterally  CARDIOVASCULAR: Regular rhythm, tachycardic. ABDOMEN: Soft. Non-tender, non-distended. Positive bowel sounds. EXTREMITIES: Without clubbing, cyanosis, or edema. NEUROLOGIC: No focal deficits.    ECOG PS 1     Impression/Plan:      Mr. Carlo Spaulding is a 78-year-old male patient, with a past medical history significant for tobacco use disorder, degenerative joint disease, hypertension, liver disease, polysubstance abuse who had presented with worsening back pain, he was found to have metastatic disease, he has a large heterogeneous left hilar mass measuring about 5.4 cm, with occlusion of the left upper lobe bronchus with severe bronchial narrowing to the left lower lobe, left lung apex lesion measuring 1.5 cm, mediastinal adenopathy, metastasis to the liver, possibly the pancreas (vs primary pancreatic), and the spine,MRI of the thoracic spine had revealed metastatic lesions in the T8 and T12 vertebral bodies, pathologic fracture of the T8 vertebral body with approximately 40% loss of height, epidural extension of tumor along the left lateral aspect of the T8, extending into the left T8-9 neural foramina, moderate central canal stenosis at T8, no cord lesion or cord edema seen, he also has a destructive lesion along the right lateral aspect of the L3 vertebral body, lumbar spine MRI has revealed metastatic lesion in the L3 vertebral body, extending into the right pedicle and superior sacral facets, lateral fracture of L3 with approximately 60% loss of height, metastatic infiltration along the endplates V3-Z1, infiltration of tumor into the epidural space at L3 resulting in severe stenosis of the central canal lateral recess on the right neural foramina: Mild right paraspinal mass contiguous with the venous occlusions and extending from the level of L2-L5 S1, the mass exerts mass-effect on the sulcus which is laterally displaced. Small metastatic lesion in the left iliac bone. The patient has a probable lung cancer, he has a widely metastatic disease.  -Brain MRI had revealed a small extra-axial enhancing mass, measuring up 16mm, this may represent a meningioma, however given her history of malignancy short-term follow-up in 3 months was recommended. Bone scan has revealed widespread metastatic disease to the bones. - The patient was evaluated by neurosurgery, no surgical interventions were recommended. Rad Onc consulted for palliative RT to the spine.  - Pain Control.  -Palliative medicine team on board.  -The patient is status post liver biopsy, await pathology results, if inconclusive, will have bronchoscopy/EBUS done, pulmonary team is on board. - Mild hypercalcemia, secondary to bony mets, calcium level has normalized. Will continue to follow. Thank you for allowing us to participate in the care of Mr. Chiang.     Rony Gil MD   HEMATOLOGY/MEDICAL ONCOLOGY  [facility]   [address] [address]  Dept: [address]: [phone]

## 2020-12-16 PROBLEM — C34.90 LUNG CANCER METASTATIC TO BONE (HCC): Status: ACTIVE | Noted: 2020-01-01

## 2020-12-16 PROBLEM — C79.51 LUNG CANCER METASTATIC TO BONE (HCC): Status: ACTIVE | Noted: 2020-01-01

## 2020-12-16 NOTE — ANESTHESIA PRE PROCEDURE
 oxyCODONE (ROXICODONE) immediate release tablet 5 mg  5 mg Oral Q4H PRN Joel Myers, DO   5 mg at 12/14/20 2310    Or    oxyCODONE HCl (OXY-IR) immediate release tablet 10 mg  10 mg Oral Q4H PRN Joel Myers, DO   10 mg at 12/16/20 0830    amLODIPine (NORVASC) tablet 10 mg  10 mg Oral Daily Bozena Fowler, DO   10 mg at 12/16/20 0830    carvedilol (COREG) tablet 25 mg  25 mg Oral BID WC Bozena Janeth, DO   25 mg at 27/37/74 4383    folic acid (FOLVITE) tablet 1 mg  1 mg Oral Daily Bozena Fowler, DO   1 mg at 12/15/20 1037    furosemide (LASIX) tablet 40 mg  40 mg Oral Daily Bozena Fowler, DO   40 mg at 12/15/20 1016    hydrALAZINE (APRESOLINE) tablet 75 mg  75 mg Oral 3 times per day Bozena Fowler DO   75 mg at 12/15/20 2147    mirtazapine (REMERON) tablet 15 mg  15 mg Oral Nightly Bozena Fowler, DO   15 mg at 12/15/20 2147    acetaminophen (TYLENOL) tablet 650 mg  650 mg Oral Q4H PRN Bozena Fowler DO   650 mg at 12/13/20 0932    sodium chloride flush 0.9 % injection 10 mL  10 mL Intravenous PRN Don Dimas MD   10 mL at 12/13/20 1230    enoxaparin (LOVENOX) injection 40 mg  40 mg Subcutaneous Daily Lucia Austin MD   40 mg at 12/15/20 0946    naloxone [facility]) injection 0.4 mg  0.4 mg Intravenous PRN FABI Buchanan        morphine PCA 1 mg/mL   Intravenous Continuous Bonnita MaxinDO   New Bag at 12/16/20 0329    sennosides-docusate sodium (SENOKOT-S) 8.6-50 MG tablet 2 tablet  2 tablet Oral BID FABI Buchanan   2 tablet at 12/15/20 1656    polyethylene glycol (GLYCOLAX) packet 17 g  17 g Oral Daily FABI Buchanan   17 g at 12/15/20 0946    dexamethasone (DECADRON) injection 10 mg  10 mg Intravenous Daily with breakfast FABI Buchanan   10 mg at 12/15/20 0945    pantoprazole (PROTONIX) injection 40 mg  40 mg Intravenous Daily FABI Buchanan   40 mg at 12/16/20 1009    And    Height: 67 inches Weight: 186 pounds BSA: 1.96 m^2 BMI: 29.13 kg/m^2     Rhythm: Within normal limits HR: 81 bpm BP: 134/96 mmHg      Findings      Left Ventricle   Left ventricular size is grossly normal.   Low normal systolic function with LVEF estimated at 50-55%. There is doppler evidence of stage I diastolic dysfunction. Right Ventricle   Moderately dilated right ventricle with mild global   hypokinesis(TAPSE=1.5cm). Left Atrium   Normal left atrium by volume index(23ml/m2)      Right Atrium   Normal right atrium. Mitral Valve   Structurally normal mitral valves. Mild mitral regurgitation is present. No evidence of hemodynamically significant mitral stenosis. Tricuspid Valve   Normal tricuspid valve structure and function. Mild tricuspid regurgitation. RVSP estimated at 34 mmHg. Aortic Valve   Trileaflet aortic valve with good leaflet separation. No hemodynamically significant aortic stenosis or regurgitation. Pulmonic Valve   Pulmonary valve not well visualized. Pericardial Effusion   No evidence for hemodynamically significant pericardial effusion. Pleural Effusion   No evidence of pleural effusion. Aorta   Normal aortic root and ascending aorta. Miscellaneous   The inferior vena cava diameter is dilated with no respiratory variation. Conclusions      Summary   Left ventricular size is grossly normal.   Low normal systolic function with LVEF estimated at 50-55%. There is doppler evidence of stage I diastolic dysfunction. Normal left atrium by volume index(23ml/m2)   Moderately dilated right ventricle with mild global hypokinesis   Normal estimated PA pressures. The inferior vena cava diameter is dilated with no respiratory variation. No obvious valvular vegetation noted in this study: if clinically   indicated consider TOBY.       Signature      ---------------------------------------------------------------- Electronically signed by Shashank Bardales MD(Interpreting   physician) on 07/16/2017 07:14 AM           Tamanna Gallo RN   12/16/2020    EXAMINATION:   CT OF THE CHEST WITH CONTRAST 12/12/2020 9:36 pm   TECHNIQUE:   CT of the chest was performed with the administration of intravenous   contrast. Multiplanar reformatted images are provided for review. Dose   modulation, iterative reconstruction, and/or weight based adjustment of the   mA/kV was utilized to reduce the radiation dose to as low as reasonably   achievable. COMPARISON:   None. HISTORY:   ORDERING SYSTEM PROVIDED HISTORY: Possible malignancy   TECHNOLOGIST PROVIDED HISTORY:   Reason for exam:->Possible malignancy   What reading provider will be dictating this exam?->CRC   FINDINGS:   Mediastinum: Large heterogeneous left hilar mass measuring approximately 5.4   x 4.1 x 3.7 cm consistent with malignancy. There is occlusion of left upper lobe bronchus and severe bronchial narrowing   to the left lower lobe. Associated mediastinal adenopathy in the prevascular space, pretracheal   retrocaval space, aortic or pulmonary window, consistent with metastatic   involvement. Largest prevascular node measures approximately 2.1 cm in   diameter. No aortic dissection or aneurysmal dilatation. No pericardial effusion. Lungs/pleura: Mildly spiculated nodular lesion, pleural based in the left   lung apex which may represent satellite lesion measuring approximately 1.5 cm   in diameter. Emphysematous changes in the lungs. No pleural effusions. Upper Abdomen: Hypodense hepatic lesions. See separate report of CT of the   abdomen and pelvis. Soft Tissues/Bones: Large lytic lesion at the T8 vertebral body with   paraspinal extension and intraspinal extension of malignancy. Pathologic   compression deformity of T8. Impression   Large heterogeneous left hilar mass as described consistent with malignancy. Occlusion of left upper lobe bronchus and severe bronchial narrowing to the   left lower lobe. Mildly spiculated nodular lesion, pleural based in the left lung apex likely   satellite malignant lesion measuring 1.5 cm. Mediastinal adenopathy likely manifesting malignant involvement. Large metastatic lesion to the T8 vertebral body with paraspinal an   intraspinal extension of malignancy and pathologic compression fracture of T8. EXAMINATION:   CT OF THE CHEST WITH CONTRAST 12/12/2020 9:36 pm   TECHNIQUE:   CT of the chest was performed with the administration of intravenous   contrast. Multiplanar reformatted images are provided for review. Dose   modulation, iterative reconstruction, and/or weight based adjustment of the   mA/kV was utilized to reduce the radiation dose to as low as reasonably   achievable. COMPARISON:   None. HISTORY:   ORDERING SYSTEM PROVIDED HISTORY: Possible malignancy   TECHNOLOGIST PROVIDED HISTORY:   Reason for exam:->Possible malignancy   What reading provider will be dictating this exam?->CRC   FINDINGS:   Mediastinum: Large heterogeneous left hilar mass measuring approximately 5.4   x 4.1 x 3.7 cm consistent with malignancy. There is occlusion of left upper lobe bronchus and severe bronchial narrowing   to the left lower lobe. Associated mediastinal adenopathy in the prevascular space, pretracheal   retrocaval space, aortic or pulmonary window, consistent with metastatic   involvement. Largest prevascular node measures approximately 2.1 cm in   diameter. No aortic dissection or aneurysmal dilatation. No pericardial effusion. Lungs/pleura: Mildly spiculated nodular lesion, pleural based in the left   lung apex which may represent satellite lesion measuring approximately 1.5 cm   in diameter. Emphysematous changes in the lungs. No pleural effusions. Upper Abdomen: Hypodense hepatic lesions. See separate report of CT of the   abdomen and pelvis. Soft Tissues/Bones: Large lytic lesion at the T8 vertebral body with   paraspinal extension and intraspinal extension of malignancy. Pathologic   compression deformity of T8. Impression   Large heterogeneous left hilar mass as described consistent with malignancy. Occlusion of left upper lobe bronchus and severe bronchial narrowing to the   left lower lobe. Mildly spiculated nodular lesion, pleural based in the left lung apex likely   satellite malignant lesion measuring 1.5 cm. Mediastinal adenopathy likely manifesting malignant involvement. Large metastatic lesion to the T8 vertebral body with paraspinal an   intraspinal extension of malignancy and pathologic compression fracture of T8. Order History    Open Order Details           Patient seen and examined, chart reviewed, agree with above findings. Anesthetic plan, risks, benefits, alternatives, and personnel involved discussed with patient. Patient verbalized an understanding and agreed to proceed. NPO status confirmed. Anesthetic plan discussed with care team members and agreed upon.     Aime Whitlock DO   12/16/2020  11:59 AM

## 2020-12-16 NOTE — H&P
Gerhard Carreon M.D.,[facility]  Keith Huizar D.O., F.A.C.O.I., Bhavik Sosa M.D. Loida Willis M.D., Kiel Rodrigues M.D. Robin Torres D.O. Patient:  Shiv Clayton 44 y.o. male MRN: [id number]     Date of Service: 12/16/2020      PULMONARY CONSULTATION    Reason for Consultation: lung mass bx  Referring Physician: Dr. Luis Morales    Communication with the referring physician will be sent via the electronic medical record. Chief Complaint: back pain     CODE STATUS:prior     SUBJECTIVE:  HPI:  Shiv Clayton is a 44 y.o. Presented to the ED 2 days ago with back pain. PMH drug abuse and liver disease . PT reports weight loss 28 lbs in 6 months , states it was intentional . He denies any abdominal pain, nausea, vomiting, melena, hematochezia, fever, or chills. 1.5PPD smoker \"for years\" and reports drinking \"a lot\" of alcohol. But quit smoking during his incarceration , has recently resumed smoking after his Dec 1 st release. Incarcerated for the last 3-4 years and did not have any problems during his check-ups at the institution. He reports a family history of cancer, but does not remember who or what types of cancer they had. He has had a hernia repair in the past and is not on any anticoagulation. Imigaing reveal lung , pancrease and lumbar spine mets. Oncology consulted , for Liver biopsy today ,  Our service has been consulted for lung mass. Known to our service seen in 2017 in the ICU drug overdose . Patient seen in room laying in bed appears in NAD , no labored breathing . He is agitated as he has significant back pain and his PCA of morphine . He states he just wants  to get the biopsy over with and get our of her \" Víctor Howard been locked up and just released on Dec 1 st. \"He reports that his complaints of SOB , back pain were not addressed in the [facility] system. Denies fever, chills, or night sweats. He denies ever having covid , TB or pneumonia during his incarceration.         Past Medical History:   Diagnosis Date    [facility]    [facility] 7/14/2017    Hepatitis C 1990    Heroin abuse ([facility] 7/14/2017    Hyperkalemia 7/14/2017    Hypertension     Liver disease     Non-traumatic rhabdomyolysis 7/14/2017    Psychiatric problem        Past Surgical History:   Procedure Laterality Date    CT NEEDLE BIOPSY LIVER PERCUTANEOUS  12/14/2020    CT NEEDLE BIOPSY LIVER PERCUTANEOUS 12/14/2020 Zaina Joiner MD [facility] CT    HERNIA REPAIR         Family History   Problem Relation Age of Onset    Other Mother     Depression Mother     High Blood Pressure Mother     Coronary Art Dis Father     High Blood Pressure Father     Mental Illness Sister     No Known Problems Brother     No Known Problems Brother     No Known Problems Sister        Social History:   Social History     Socioeconomic History    Marital status:      Spouse name: Not on file    Number of children: Not on file    Years of education: Not on file    Highest education level: Not on file   Occupational History    Not on file   Social Needs    Financial resource strain: Not on file    Food insecurity     Worry: Not on file     Inability: Not on file    Transportation needs     Medical: Not on file     Non-medical: Not on file   Tobacco Use    Smoking status: Current Every Day Smoker     Packs/day: 1.50     Types: Cigarettes    Smokeless tobacco: Never Used   Substance and Sexual Activity    Alcohol use:  Yes     Alcohol/week: 72.0 standard drinks     Types: 70 Cans of beer, 2 Shots of liquor per week    Drug use: Yes     Types: IV, Cocaine     Comment: fentanyl, heroin    Sexual activity: Yes     Partners: Female   Lifestyle    Physical activity     Days per week: Not on file     Minutes per session: Not on file    Stress: Not on file   Relationships    Social connections     Talks on phone: Not on file     Gets together: Not on file     Attends [employer] witness service: daily  [DISCONTINUED] folic acid (FOLVITE) 1 MG tablet, Take 1 tablet by mouth daily  [DISCONTINUED] pantoprazole (PROTONIX) 40 MG tablet, Take 1 tablet by mouth every morning (before breakfast)    CURRENT MEDS :  Scheduled Meds:   methadone  5 mg Oral 3 times per day    gabapentin  400 mg Oral TID    amLODIPine  10 mg Oral Daily    carvedilol  25 mg Oral BID WC    folic acid  1 mg Oral Daily    furosemide  40 mg Oral Daily    hydrALAZINE  75 mg Oral 3 times per day    mirtazapine  15 mg Oral Nightly    enoxaparin  40 mg Subcutaneous Daily    sennosides-docusate sodium  2 tablet Oral BID    polyethylene glycol  17 g Oral Daily    dexamethasone  10 mg Intravenous Daily with breakfast    pantoprazole  40 mg Intravenous Daily    And    sodium chloride (PF)  10 mL Intravenous Daily       Continuous Infusions:   morphine         No Known Allergies    REVIEW OF SYSTEMS:  Constitutional: Denies fever, weight loss, night sweats, and fatigue  Skin: Denies pigmentation, dark lesions, and rashes   HEENT: Denies hearing loss, tinnitus, ear drainage, epistaxis, sore throat, and hoarseness. Cardiovascular: Denies palpitations, chest pain, and chest pressure. Respiratory: +cough, +dyspnea at rest, hemoptysis, apnea, and choking.   Gastrointestinal: Denies nausea, vomiting, poor appetite, diarrhea, heartburn or reflux  Genitourinary: Denies dysuria, frequency, urgency or hematuria  Musculoskeletal: Denies myalgias, muscle weakness, and bone pain  Neurological: Denies dizziness, vertigo, headache, and focal weakness  Psychological: Denies anxiety and depression  Endocrine: Denies heat intolerance and cold intolerance  Hematopoietic/Lymphatic: Denies bleeding problems and blood transfusions  + back pain   OBJECTIVE:   /76   Pulse 98   Temp 96.9 °F (36.1 °C) (Temporal)   Resp 17   Ht 5' 9\" (1.753 m)   Wt 195 lb (88.5 kg)   SpO2 93%   BMI 28.80 kg/m²   SpO2 Readings from Last 1 Encounters:   12/16/20 93% or pulmonary window, consistent with metastatic   involvement.  Largest prevascular node measures approximately 2.1 cm in   diameter. No aortic dissection or aneurysmal dilatation. No pericardial effusion. Lungs/pleura: Mildly spiculated nodular lesion, pleural based in the left   lung apex which may represent satellite lesion measuring approximately 1.5 cm   in diameter. Emphysematous changes in the lungs. No pleural effusions. Upper Abdomen: Hypodense hepatic lesions.  See separate report of CT of the   abdomen and pelvis. Soft Tissues/Bones: Large lytic lesion at the T8 vertebral body with   paraspinal extension and intraspinal extension of malignancy.  Pathologic   compression deformity of T8.       Impression   Large heterogeneous left hilar mass as described consistent with malignancy. Occlusion of left upper lobe bronchus and severe bronchial narrowing to the   left lower lobe. Mildly spiculated nodular lesion, pleural based in the left lung apex likely   satellite malignant lesion measuring 1.5 cm. Mediastinal adenopathy likely manifesting malignant involvement. Large metastatic lesion to the T8 vertebral body with paraspinal an   intraspinal extension of malignancy and pathologic compression fracture of T8.             Echo:   Summary   Left ventricular size is grossly normal.   Low normal systolic function with LVEF estimated at 50-55%. There is doppler evidence of stage I diastolic dysfunction. Normal left atrium by volume index(23ml/m2)   Moderately dilated right ventricle with mild global hypokinesis   Normal estimated PA pressures. The inferior vena cava diameter is dilated with no respiratory variation. No obvious valvular vegetation noted in this study: if clinically   indicated consider TOBY.       Signature      ----------------------------------------------------------------      Labs:  Lab Results   Component Value Date    WBC 17.7 12/16/2020    HGB 13.3 12/16/2020 HCT 37.7 12/16/2020    MCV 90.0 12/16/2020    MCH 31.7 12/16/2020    MCHC 35.3 12/16/2020    RDW 12.7 12/16/2020     12/16/2020    MPV 9.3 12/16/2020     Lab Results   Component Value Date     12/16/2020    K 3.4 12/16/2020    CL 94 12/16/2020    CO2 25 12/16/2020    BUN 40 12/16/2020    CREATININE 1.1 12/16/2020    LABALBU 3.8 12/16/2020    CALCIUM 9.9 12/16/2020    GFRAA >60 12/16/2020    LABGLOM >60 12/16/2020     Lab Results   Component Value Date    PROTIME 12.7 12/14/2020    INR 1.1 12/14/2020     No results for input(s): PROBNP in the last 72 hours. No results for input(s): TROPONINI in the last 72 hours. No results for input(s): PROCAL in the last 72 hours. This SmartLink has not been configured with any valid records. Micro:  No results for input(s): CULTRESP in the last 72 hours. No results for input(s): LABGRAM in the last 72 hours. No results for input(s): LEGUR in the last 72 hours. No results for input(s): STREPNEUMAGU in the last 72 hours. No results for input(s): LP1UAG in the last 72 hours. Assessment:  1. Hilar mass most likely consistent with primary lung cancer with mets to the thoracis and lumbar spine,2.4x3x3.2 cm diameter  pancreas, and liver  2. Polysubstance abuse , hx of overdose 2017  3. Major depressive disorder   4. Nicotine dependence     Plan:  1. Proceed with a diagnostic bronchoscopy/EBUS  today.         Electronically signed by Minnie Cortez MD on 12/16/2020 at 12:05 PM

## 2020-12-16 NOTE — PROGRESS NOTES
Inpatient Medical Oncology Progress Note    Subjective:  No fever. Came back from Bronchoscopy by Dr. Cari Celeste    Objective:  BP (!) 112/94   Pulse 98   Temp 98 °F (36.7 °C)   Resp 22   Ht 5' 9\" (1.753 m)   Wt 195 lb (88.5 kg)   SpO2 96%   BMI 28.80 kg/m²   GENERAL: Alert, oriented x 3, not in acute distress. HEENT: PERRLA; EOMI. Oropharynx clear. NECK: Supple. No palpable lymphadenopathy. LUNGS: Decrease air entry bilaterally  CARDIOVASCULAR: Regular rhythm, tachycardic. ABDOMEN: Soft. Non-tender, non-distended. Positive bowel sounds. EXTREMITIES: Without clubbing, cyanosis, or edema. NEUROLOGIC: No focal deficits.    ECOG PS 1    Diagnostics:  Lab Results   Component Value Date    WBC 17.7 (H) 12/16/2020    HGB 13.3 12/16/2020    HCT 37.7 12/16/2020    MCV 90.0 12/16/2020     12/16/2020     Lab Results   Component Value Date     12/16/2020    K 3.4 (L) 12/16/2020    CL 94 (L) 12/16/2020    CO2 25 12/16/2020    BUN 40 (H) 12/16/2020    CREATININE 1.1 12/16/2020    GLUCOSE 91 12/16/2020    CALCIUM 9.9 12/16/2020    PROT 6.8 12/16/2020    LABALBU 3.8 12/16/2020    BILITOT 0.5 12/16/2020    ALKPHOS 66 12/16/2020    AST 27 12/16/2020    ALT 24 12/16/2020    LABGLOM >60 12/16/2020    GFRAA >60 12/16/2020     Lab Results   Component Value Date    .7 (H) 12/14/2020      pending  Chromogranin A pending    Impression/Plan:  57-year-old male patient, who presented with worsening back pain, found to have metastatic disease, large heterogeneous left hilar mass measuring about 5.4 cm, with occlusion of the left upper lobe bronchus with severe bronchial narrowing to the left lower lobe, left lung apex lesion measuring 1.5 cm, mediastinal adenopathy, metastasis to the liver, possibly the pancreas and the spine  MRI Thoracic spine had revealed metastatic lesions in the T8 and T12 vertebral bodies, pathologic fracture of the T8 vertebral body with approximately 40% loss of height, epidural extension of tumor along the left lateral aspect of the T8, extending into the left T8-9 neural foramina, moderate central canal stenosis at T8, no cord lesion or cord edema seen, he also has a destructive lesion along the right lateral aspect of the L3 vertebral body   MRI Lumbar spine revealed metastatic lesion in the L3 vertebral body, extending into the right pedicle and superior sacral facets, lateral fracture of L3 with approximately 60% loss of height, metastatic infiltration along the endplates Q3-R3, infiltration of tumor into the epidural space at L3 resulting in severe stenosis of the central canal lateral recess on the right neural foramina: Mild right paraspinal mass contiguous with the venous occlusions and extending from the level of L2-L5 S1, the mass exerts mass-effect on the sulcus which is laterally displaced. Small metastatic lesion in the left iliac bone. The patient has a probable lung cancer, he has a widely metastatic disease. MRI Brain revealed a small extra-axial enhancing mass, measuring up 16mm, this may represent a meningioma, however given her history of malignancy short-term follow-up in 3 months was recommended. Bone scan revealed widespread metastatic disease to the bones. The patient was evaluated by neurosurgery, no surgical interventions recommended. Rad Onc consulted for palliative RT to the spine. Pain Control. Palliative medicine team on board. S/P liver biopsy, awaiting pathology  Bronchoscopy/EBUS today 12/16/2020 by Dr. Willam Norman from pulmonary team.   Findings included Large endobronchial mass completely obstructing the distal left mainstem  Pathology pending   Will continue to follow.     12/16/2020  Clint Arroyo MD

## 2020-12-16 NOTE — ANESTHESIA POSTPROCEDURE EVALUATION
Department of Anesthesiology  Postprocedure Note    Patient: Bhanu Singh  MRN: [id number]  YOB: 1970  Date of evaluation: 12/16/2020  Time:  4:15 PM     Procedure Summary     Date: 12/16/20 Room / Location: [facility] 03 / [facility]    Anesthesia Start: 1153 Anesthesia Stop: 3323    Procedures:       BRONCHOSCOPY DIAGNOSTIC OR CELL [address] ONLY (N/A )      BRONCHOSCOPY BRUSHINGS      BRONCHOSCOPY/TRANSBRONCHIAL NEEDLE BIOPSY      BRONCHOSCOPY/TRANSBRONCHIAL LUNG BIOPSY Diagnosis: (,)    Surgeons: Kimberley Murdock MD Responsible Provider: Leigh Keating DO    Anesthesia Type: general ASA Status: 4          Anesthesia Type: general    Ozzie Phase I: Ozzie Score: 10    Ozzie Phase II:      Last vitals: Reviewed and per EMR flowsheets.        Anesthesia Post Evaluation    Patient location during evaluation: PACU  Patient participation: complete - patient participated  Level of consciousness: awake and alert  Pain score: 1  Airway patency: patent  Nausea & Vomiting: no nausea and no vomiting  Complications: no  Cardiovascular status: hemodynamically stable  Respiratory status: acceptable  Hydration status: euvolemic

## 2020-12-16 NOTE — PROGRESS NOTES
Hospitalist Progress Note      Synopsis: Patient admitted on 12/12/2020     Subjective    Patient presented to the ER with back pain. Pain was significant with radiation. Investigations in the ER showed a lung mass and metastatic areas in the thoracic and lumbar spine which are now confirmed. He just came back from his MRI  December 14, 2020  Feeling better. Status biopsy of liver  December 15, 2020  No new changes except for his PCA pump. Awaiting biopsy. He is not keen on discharge. On Decadron. Possibly EBUS on Wednesday or Thursday if inconclusive with liver biopsy    Exam:  /82   Pulse 98   Temp 98.1 °F (36.7 °C) (Temporal)   Resp 20   Ht 5' 9\" (1.753 m)   Wt 195 lb (88.5 kg)   SpO2 96%   BMI 28.80 kg/m²   General appearance: No apparent distress, appears stated age and cooperative. HEENT: Pupils equal, round, and reactive to light. Conjunctivae/corneas clear. Neck: Supple. No jugular venous distention. Trachea midline. Respiratory:  Normal respiratory effort. Clear to auscultation, bilaterally without Rales/Wheezes/Rhonchi. Cardiovascular: Regular rate and rhythm with normal S1/S2 without murmurs, rubs or gallops. Abdomen: Soft, non-tender, non-distended with normal bowel sounds. Musculoskeletal: No clubbing, cyanosis or edema bilaterally. Brisk capillary refill. 2+ lower extremity pulses (dorsalis pedis).    Skin:  No rashes    Neurologic: awake, alert and following commands   Right-sided abdomen with postop dressings in place    Medications:  Reviewed    Infusion Medications    morphine       Scheduled Medications    methadone  5 mg Oral 3 times per day    gabapentin  400 mg Oral TID    amLODIPine  10 mg Oral Daily    carvedilol  25 mg Oral BID WC    folic acid  1 mg Oral Daily    furosemide  40 mg Oral Daily    hydrALAZINE  75 mg Oral 3 times per day    mirtazapine  15 mg Oral Nightly    enoxaparin  40 mg Subcutaneous Daily    sennosides-docusate sodium  2 tablet Oral BID    polyethylene glycol  17 g Oral Daily    dexamethasone  10 mg Intravenous Daily with breakfast    pantoprazole  40 mg Intravenous Daily    And    sodium chloride (PF)  10 mL Intravenous Daily     PRN Meds: gadobenate dimeglumine, bisacodyl, oxyCODONE **OR** oxyCODONE, acetaminophen, sodium chloride flush, naloxone    I/O    Intake/Output Summary (Last 24 hours) at 12/15/2020 1916  Last data filed at 12/15/2020 1913  Gross per 24 hour   Intake 996.56 ml   Output --   Net 996.56 ml       Labs:   Recent Labs     12/13/20  0535 12/14/20  0600 12/15/20  0632   WBC 14.4* 11.1 17.3*   HGB 14.6 14.3 13.7   HCT 42.8 41.7 39.7    260 282       Recent Labs     12/13/20  0535 12/14/20  0600 12/15/20  0632    132 130*   K 4.0 4.4 3.7    94* 94*   CO2 22 27 28   BUN 21* 33* 41*   CREATININE 1.0 1.1 1.1   CALCIUM 10.6* 10.3* 9.9       Recent Labs     12/13/20  0535 12/14/20  0600 12/15/20  0632   PROT 6.9 7.3 7.1   ALKPHOS 71 68 69   ALT 24 19 19   AST 14 13 17   BILITOT 0.7 0.6 0.4       Recent Labs     12/14/20  0600   INR 1.1       No results for input(s): CKTOTAL, TROPONINI in the last 72 hours. Chronic labs:  Lab Results   Component Value Date    INR 1.1 12/14/2020       Radiology:  Ct Chest W Contrast    Result Date: 12/12/2020  EXAMINATION: CT OF THE CHEST WITH CONTRAST 12/12/2020 9:36 pm TECHNIQUE: CT of the chest was performed with the administration of intravenous contrast. Multiplanar reformatted images are provided for review. Dose modulation, iterative reconstruction, and/or weight based adjustment of the mA/kV was utilized to reduce the radiation dose to as low as reasonably achievable. COMPARISON: None.  HISTORY: ORDERING SYSTEM PROVIDED HISTORY: Possible malignancy TECHNOLOGIST PROVIDED HISTORY: Reason for exam:->Possible malignancy What reading provider will be dictating this exam?->CRC FINDINGS: Mediastinum: Large heterogeneous left hilar mass measuring approximately 5.4 x 4.1 x 3.7 cm consistent with malignancy. There is occlusion of left upper lobe bronchus and severe bronchial narrowing to the left lower lobe. Associated mediastinal adenopathy in the prevascular space, pretracheal retrocaval space, aortic or pulmonary window, consistent with metastatic involvement. Largest prevascular node measures approximately 2.1 cm in diameter. No aortic dissection or aneurysmal dilatation. No pericardial effusion. Lungs/pleura: Mildly spiculated nodular lesion, pleural based in the left lung apex which may represent satellite lesion measuring approximately 1.5 cm in diameter. Emphysematous changes in the lungs. No pleural effusions. Upper Abdomen: Hypodense hepatic lesions. See separate report of CT of the abdomen and pelvis. Soft Tissues/Bones: Large lytic lesion at the T8 vertebral body with paraspinal extension and intraspinal extension of malignancy. Pathologic compression deformity of T8. Large heterogeneous left hilar mass as described consistent with malignancy. Occlusion of left upper lobe bronchus and severe bronchial narrowing to the left lower lobe. Mildly spiculated nodular lesion, pleural based in the left lung apex likely satellite malignant lesion measuring 1.5 cm. Mediastinal adenopathy likely manifesting malignant involvement. Large metastatic lesion to the T8 vertebral body with paraspinal an intraspinal extension of malignancy and pathologic compression fracture of T8. Ct Lumbar Spine Wo Contrast    Result Date: 12/12/2020  EXAMINATION: CT OF THE LUMBAR SPINE WITHOUT CONTRAST  12/12/2020 TECHNIQUE: CT of the lumbar spine was performed without the administration of intravenous contrast. Multiplanar reformatted images are provided for review. Dose modulation, iterative reconstruction, and/or weight based adjustment of the mA/kV was utilized to reduce the radiation dose to as low as reasonably achievable.  COMPARISON: None HISTORY: ORDERING SYSTEM PROVIDED HISTORY: pain TECHNOLOGIST PROVIDED HISTORY: Reason for exam:->pain What reading provider will be dictating this exam?->CRC FINDINGS: BONES/ALIGNMENT: There are lytic destructive changes in the L3 vertebral body, more so on the right. Lytic lesion extends to the right pedicle, transverse process and superior articular facet. There is pathological fracture involving the right lateral aspect of the L3 vertebral body, with approximately 60% loss of height. Soft tissue fullness extends into the adjacent psoas muscle. The extent of epidural involvement is unclear on this study. DEGENERATIVE CHANGES: L1-2: Unremarkable. L2-3: Small disc bulge. Mild facet hypertrophy. Mild central canal, lateral recess and neural foraminal stenoses. L3-4: Destructive changes in the right L3 vertebral body as described. Extent of epidural involvement and therefore the extent of central canal stenosis is not well delineated on this study. The lesion likely extends into the right neural foramina. Moderate stenosis of the left neural foramina. L4-5: Prominent loss of disc height with small disc bulge. Mild facet and ligamentous hypertrophy. Mild-to-moderate central canal and lateral recess stenoses. Moderate neural foraminal stenoses. L5-S1: Minimal disc bulge. Mild facet hypertrophy. No significant central canal or lateral recess stenosis. Mild to moderate neural foraminal stenoses. SOFT TISSUES/RETROPERITONEUM: Soft tissue fullness in the right psoas muscle, extending from the L3 level inferiorly. 1. Lytic destructive lesion along the right lateral aspect of the L3 vertebral body, involving the right pedicle, transverse process and superior articular facet. 2. Pathological fracture on the right with loss of height by approximately 60%. The lesion may represent malignancy or an infectious process. Destructive changes in the discs characteristic of discitis/osteomyelitis are not evident on this study.   Further evaluation with pre and post contrast enhanced MRI is recommended. 3. Soft tissue fullness extends from the L3 vertebral body into the adjacent psoas muscle, which is landis than the contralateral side. The soft tissue component may represent malignancy or infection. 4. Extent of epidural involvement of disease at L3 is not well delineated on this study. Pre and post contrast enhanced MRI should clarify. 5. Variable degrees of degenerative changes, as described. Ct Abdomen Pelvis W Iv Contrast Additional Contrast? None    Result Date: 12/12/2020  EXAMINATION: CT OF THE ABDOMEN AND PELVIS WITH CONTRAST 12/12/2020 9:36 pm TECHNIQUE: CT of the abdomen and pelvis was performed with the administration of intravenous contrast. Multiplanar reformatted images are provided for review. Dose modulation, iterative reconstruction, and/or weight based adjustment of the mA/kV was utilized to reduce the radiation dose to as low as reasonably achievable. COMPARISON: Correlation is made with the prior CT of the lumbar spine HISTORY: ORDERING SYSTEM PROVIDED HISTORY: r/o Psoas abscess TECHNOLOGIST PROVIDED HISTORY: Additional Contrast?->None Reason for exam:->r/o Psoas abscess What reading provider will be dictating this exam?->CRC FINDINGS: Lower Chest: The lung bases are clear. Organs: Hypodense lesion in the right lobe of the liver measuring approximately 3.5 cm in diameter with mild peripheral enhancement. Smaller hypodense lesion in the right lobe of the liver inferiorly measuring 1 cm in diameter. Findings most consistent with metastatic disease. The spleen, adrenals, are within normal limits. Mildly heterogeneous mass extending cephalad from the head of the pancreas, most consistent with pancreatic malignancy measuring approximately 2.4 x 3 x 3.2 cm in diameter. The pancreatic duct is not dilated. Adrenals and gallbladder within normal limits. Kidneys enhance homogeneously with no obstructive uropathy.  GI/Bowel: No evidence of bowel obstruction or Has been seen by radiation oncology and oncology and by pulmonary and of course by interventional radiology  States he is okay for discharge as long as he could be discharged safely to allow transport back and forth from hospital.  He is willing to consider discharge for tomorrow  Await EBUS  December 15, 2020  Try to get his pain under control. Mildly low sodium noted. Hypercalcemia being watched by oncology services. Decadron IV in place. He tells me he is eating well. Only 2 L of oxygen not needed   Await biopsy results and he is not keen on discharge  Oncology surgery noted as well as pulmonology and hematology oncology  Diet: DIET GENERAL; No Added Salt (3-4 GM)  Diet NPO, After Midnight  Code Status: Prior  PT/OT Eval Status:   Unable yet  DVT Prophylaxis:   Can place  Recommended disposition at discharge:  unsure yet    +++++++++++++++++++++++++++++++++++++++++++++++++  Josie Cesar MD   [facility].  +++++++++++++++++++++++++++++++++++++++++++++++++  NOTE: This report was transcribed using voice recognition software. Every effort was made to ensure accuracy; however, inadvertent computerized transcription errors may be present. Yes

## 2020-12-16 NOTE — PROCEDURES
Bronchoscopy Inpatient Procedure Note    Date of Procedure: 12/16/2020    Pre-op Diagnosis: Large left hilar mass    Post-op Diagnosis: Large endobronchial mass completely obstructing the distal left mainstem    Bronchoscopist: Cindy Cazares      Anesthesia: Please see CRNA note    Procedure: Flexible fiberoptic bronchoscopy    Estimated Blood Loss: Minimal    Complications: None    Indications and History      (Please see today's progress notes for the latest issues,  physical exam and lab data)    Consent to Procedure  The risks, benefits, complications, treatment options and expected outcomes were discussed with the patient and/or POA  The possibilities of reaction to medication, pulmonary aspiration, perforation of a viscus, bleeding, failure to diagnose a condition and creating a complication requiring transfusion or operation were discussed with the patient who freely signed the consent. Description of Procedure  The patient was intubated on mechanical ventilation and placed on 100% oxygen. Cynthia Chavez was monitored by anesthesiology/CRNA standard monitoring devices. Kuldeep Aguayo and the procedure were verified as Flexible Fiberoptic Bronchoscopy. A Time Out was held and the above information confirmed. The bronchoscope was then passed into the trachea via the ET tube. After careful inspection of the tracheal, the bronchoscope was sequentially passed into all segments of the left and right endobronchial trees to the second and/or third divisions. Endobronchial findings      Large Heterogeneous vascular endobronchial lesion obstructing the left distal mainstem                Trachea: No tracheal stenosis.   Edematous mucosa  Connie  Sharp, edematous mucosa    Right Main Stem Bronchus  Edematous mucosa  Right Upper Lobe Bronchi Edematous mucosa and Collapse with mucus plugging  Right Middle Lobe Bronchi  Edematous mucosa  Right Lower Lobe Bronchi (including the Superior segment)  Edematous mucosa and Collapse with mucus plugging    Left Main Stem Bronchus Edematous mucosa, large endobronchial lesion obstructing the distal left mainstem unable to advance bronc beyond that. Specimens Taken    Washings -  Amount - 10cc  Location -right lower lobe  Brushings -cytology brushing from the left endobronchial lesion x2  Endobronchial needle aspiration with NEIL-2  Endobronchial biopsy of the mass  - Number of Biopsies -3 location(s)  -endobronchial mass    Patient had oozing from around the tumor which was well controlled with 2 cc of diluted epinephrine, 5000 units of thrombin.     Cami Portillo MD

## 2020-12-16 NOTE — PROGRESS NOTES
Hospitalist Progress Note      SYNOPSIS: Patient admitted on 2020 for severe back pain, patient found to have left lung apex mass with adenopathy and mets to the liver and the spine. Bronchoscopy with biopsy showed non-small cell lung cancer with carcinoid features. Patient recently have been confused intermittently more so yesterday    SUBJECTIVE: The patient denies any problems but appears agitated and moving randomly in the bed. He is naked and refusing to keep covers on himself. Patient seen and examined  Records reviewed. Temp (24hrs), Av.8 °F (34.9 °C), Min:82.2 °F (27.9 °C), Max:98 °F (36.7 °C)    DIET: Diet NPO, After Midnight  CODE: Prior    Intake/Output Summary (Last 24 hours) at 2020 1533  Last data filed at 2020 1245  Gross per 24 hour   Intake 970.66 ml   Output 750 ml   Net 220.66 ml       OBJECTIVE:    BP (!) 112/94   Pulse 98   Temp 98 °F (36.7 °C)   Resp 22   Ht 5' 9\" (1.753 m)   Wt 195 lb (88.5 kg)   SpO2 96%   BMI 28.80 kg/m²     General appearance: No respiratory distress, moving randomly in bed and confused although able to say that he is in a hospital and recalls the name of the hospital.  HEENT:  Conjunctivae/corneas clear. Neck: Supple. No jugular venous distention. Respiratory: Clear to auscultation bilaterally, normal respiratory effort  Cardiovascular: Regular rate rhythm, normal S1-S2  Abdomen: Soft, nontender, nondistended  Musculoskeletal: No clubbing, cyanosis, no bilateral lower extremity edema. Brisk capillary refill. Skin:  No rashes  on visible skin  Neurologic: Awake confused and mildly agitated, easily redirected, and following commands. .  ASSESSMENT:  Lung mass  Bone metastasis to the lumbar spine  Liver metastasis  Agitation and periodic confusion       PLAN  Palliative consult reviewed  Psych consult  We will start small dose of Depakote and reevaluate    DISPOSITION:     Medications:  REVIEWED DAILY    Infusion Medications Scheduled Medications    lidocaine PF  5 mL Nebulization Once    sodium chloride flush  10 mL Intravenous 2 times per day    methadone  5 mg Oral 3 times per day    gabapentin  400 mg Oral TID    amLODIPine  10 mg Oral Daily    carvedilol  25 mg Oral BID WC    folic acid  1 mg Oral Daily    furosemide  40 mg Oral Daily    hydrALAZINE  75 mg Oral 3 times per day    mirtazapine  15 mg Oral Nightly    enoxaparin  40 mg Subcutaneous Daily    dexamethasone  10 mg Intravenous Daily with breakfast    pantoprazole  40 mg Intravenous Daily    And    sodium chloride (PF)  10 mL Intravenous Daily     PRN Meds: ondansetron, promethazine, sodium chloride flush, oxyCODONE **OR** oxyCODONE, gadobenate dimeglumine, bisacodyl, acetaminophen, sodium chloride flush    Labs:     Recent Labs     12/14/20  0600 12/15/20  0632 12/16/20  0555   WBC 11.1 17.3* 17.7*   HGB 14.3 13.7 13.3   HCT 41.7 39.7 37.7    282 278       Recent Labs     12/14/20  0600 12/15/20  0632 12/16/20  0555    130* 132   K 4.4 3.7 3.4*   CL 94* 94* 94*   CO2 27 28 25   BUN 33* 41* 40*   CREATININE 1.1 1.1 1.1   CALCIUM 10.3* 9.9 9.9       Recent Labs     12/14/20  0600 12/15/20  0632 12/16/20  0555   PROT 7.3 7.1 6.8   ALKPHOS 68 69 66   ALT 19 19 24   AST 13 17 27   BILITOT 0.6 0.4 0.5       Recent Labs     12/14/20  0600   INR 1.1       No results for input(s): CKTOTAL, TROPONINI in the last 72 hours. Chronic labs:    Lab Results   Component Value Date    INR 1.1 12/14/2020       Radiology: REVIEWED DAILY    +++++++++++++++++++++++++++++++++++++++++++++++++  [facility] Physician - Hospitalist  [address] [place] [address]  +++++++++++++++++++++++++++++++++++++++++++++++++  NOTE: This report was transcribed using voice recognition software. Every effort was made to ensure accuracy; however, inadvertent computerized transcription errors may be present.

## 2020-12-16 NOTE — PROGRESS NOTES
Palliative Care Department  [phone]  Palliative Care Progress Note  Provider Mickey Michaud  [id number]  Hospital Day: 5    Referring Provider: Sean Degroot MD  Palliative Medicine was consulted for assistance with: Symptom management, goals of care, CODE STATUS and family support    Brief summary:  Chapo Mahan is a 44 y.o. who presented to the hospital with chief complaints of back pain and was admitted 12/12/2020 with diagnosis(ses) of malignant neoplasm metastatic to bone, lung mass, pathologic fracture of vertebrae due to neoplastic disease. Patient has started workup for malignancy. ASSESSMENT/PLAN:   Pertinent hospital diagnoses:  1. Metastatic cancer- unknown primary areas involved bone, pancreas, lung, liver, lymph nodes  - workup/multuiple consults await results  - s/p liver biopsy 12/14  -   - EBUS today, distal left mainstem bronchus completely obstructed from vascular mass     2.  Pain associated with neoplasm  -  PCA morphine 2 mg every 15 mins PRN- per nurse pushed 10 times and received 8 doses since last cleared, not clearly documented in chart as to exactly how many doses he has received total, suspect possible alcohol withdrawal, patient having difficulty following directions, will d/c PCA and go with prn oxycodone 10-15mg q4 hrs prn  - started methadone 5mg q8 hours on 12/15  - dexamethasone 10 mg IV in am x 3 days  - protonix IV prophylaxis  -  EKG screening normal QTc, ok for methadone  - increased neurontin to 400mg TID  - reports back pain is 10/10 despite all pain medication     3.  GI  - senna-s 2 tabs bid  - miralax 17 gm daily (make PRN if loose BM)    PALLIATIVE CARE ENCOUNTER  - Capacity: At this time, Chapo Mahan, Does have capacity for medical decision-making.   Capacity is time limited and situation/question specific  - Outcome of goals of care meeting: adjustment of pain medication  - Code Status:   full  - Advanced directives: None  - Surrogate decision maker/Legal NOK:     Contacts: Eduardo Augustine [phone]  - Spiritual assessment: none  - Bereavement and grief: diagnosis and young age  - DISPO: workup for malignancy, pain control    Referrals to: none today    Subjective   Chart reviewed  Patient currently undergoing work-up for malignancy which is included consults to hepatobiliary and pancreatic surgery, hematology/oncology, neurosurgery, pulmonology and interventional radiology. Rates pain 10/10 midback, states pain hasn't improved at all, though compared to the numbers he gave yesterday it has improved. Patient very restless, moving all over the bed, grabbing at IV tubing. States he has drank 2 bottles of crown royal since the 1st and has another bottle at home which he wants to drink right now. States he is a graduate of [employer]. Primary team also aware of patient's erratic behavior in comparison to yesterday.     Inpatient medications reviewed: yes  Home Medications reviewed: yes    OBJECTIVE:   Prognosis: Poor    Physical Exam:  /76   Pulse 98   Temp 96.9 °F (36.1 °C) (Temporal)   Resp 17   Ht 5' 9\" (1.753 m)   Wt 195 lb (88.5 kg)   SpO2 93%   BMI 28.80 kg/m²   Gen:  Appears stated age, restless, difficult to direct  HEENT:  Normocephalic, atraumatic, mucosa dry, PERRLA, EOMI, sclera anicteric  Neck:  Supple, trachea midline, no JVD  Lungs:  respirations unlabored, clear to auscultation bilaterally  Heart[de-identified]  normal S1S2, regular rate and rhythm  Abd:  Soft, non tender, non distended, bowel sounds present  :  No rogers  Ext:  moving all extremities  Skin:  Warm and dry without rash, bruising, petechiae, scar under left scapula  Neuro:  Alert, oriented  Psych: anxious, moving all over the bed, agitated    Objective data reviewed: labs, images, records, medication use, vitals and chart    Time/Communication  Greater than 50% of time spent, total 25 minutes in counseling and coordination of care at the bedside/over the telephone regarding symptom management and see above. Thank you for allowing Palliative Medicine to participate in the care of Trinity Blanca.       Provider 101 [facility]

## 2020-12-17 PROBLEM — R91.8 LUNG MASS: Status: ACTIVE | Noted: 2020-01-01

## 2020-12-17 NOTE — PROGRESS NOTES
Inpatient Medical Oncology Progress Note    Subjective:  No fever. lethargic    Objective:  BP (!) 142/78   Pulse 108   Temp 98 °F (36.7 °C)   Resp 17   Ht 5' 9\" (1.753 m)   Wt 195 lb (88.5 kg)   SpO2 98%   BMI 28.80 kg/m²   GENERAL: lethargic not in distress  HEENT: PERRLA; EOMI. Oropharynx clear. NECK: Supple. No palpable lymphadenopathy. LUNGS: Fair air entry bilaterally  CARDIOVASCULAR: tachycardic. ABDOMEN: Soft. Non-tender, non-distended. Positive bowel sounds. EXTREMITIES: Without clubbing, cyanosis, or edema. NEUROLOGIC: No focal deficits.    ECOG PS 1-2    Diagnostics:  Lab Results   Component Value Date    WBC 21.1 (H) 12/17/2020    HGB 14.1 12/17/2020    HCT 40.1 12/17/2020    MCV 91.3 12/17/2020     12/17/2020     Lab Results   Component Value Date     12/17/2020    K 3.9 12/17/2020    CL 94 (L) 12/17/2020    CO2 28 12/17/2020    BUN 32 (H) 12/17/2020    CREATININE 0.9 12/17/2020    GLUCOSE 100 (H) 12/17/2020    CALCIUM 10.3 (H) 12/17/2020    PROT 6.9 12/17/2020    LABALBU 3.9 12/17/2020    BILITOT 0.7 12/17/2020    ALKPHOS 71 12/17/2020    AST 28 12/17/2020    ALT 27 12/17/2020    LABGLOM >60 12/17/2020    GFRAA >60 12/17/2020     Lab Results   Component Value Date    .7 (H) 12/14/2020      123 on 12/14/2020  Chromogranin A pending    Impression/Plan:  68-year-old male patient, who presented with worsening back pain, found to have metastatic disease, large heterogeneous left hilar mass measuring about 5.4 cm, with occlusion of the left upper lobe bronchus with severe bronchial narrowing to the left lower lobe, left lung apex lesion measuring 1.5 cm, mediastinal adenopathy, metastasis to the liver, possibly the pancreas and the spine  MRI Thoracic spine had revealed metastatic lesions in the T8 and T12 vertebral bodies, pathologic fracture of the T8 vertebral body with approximately 40% loss of height, epidural extension of tumor along the left lateral aspect of the T8, extending into the left T8-9 neural foramina, moderate central canal stenosis at T8, no cord lesion or cord edema seen, he also has a destructive lesion along the right lateral aspect of the L3 vertebral body   MRI Lumbar spine revealed metastatic lesion in the L3 vertebral body, extending into the right pedicle and superior sacral facets, lateral fracture of L3 with approximately 60% loss of height, metastatic infiltration along the endplates Y1-O8, infiltration of tumor into the epidural space at L3 resulting in severe stenosis of the central canal lateral recess on the right neural foramina: Mild right paraspinal mass contiguous with the venous occlusions and extending from the level of L2-L5 S1, the mass exerts mass-effect on the sulcus which is laterally displaced. Small metastatic lesion in the left iliac bone. The patient has a probable lung cancer, he has a widely metastatic disease. MRI Brain revealed a small extra-axial enhancing mass, measuring up 16mm, this may represent a meningioma, however given her history of malignancy short-term follow-up in 3 months was recommended. Bone scan revealed widespread metastatic disease to the bones. The patient was evaluated by neurosurgery, no surgical interventions recommended. Rad Onc for palliative RT to the spine. Pain Control. Palliative medicine team on board. S/P liver biopsy, awaiting pathology  Bronchoscopy/EBUS 12/16/2020 by Dr. Jyoti Reis from pulmonary team.   Findings included Large endobronchial mass completely obstructing the distal left mainstem  Pathology pending   Will continue to follow.  Will likely need rehab    12/17/2020  Ethan Hatchet, MD

## 2020-12-17 NOTE — CARE COORDINATION
Attempted to meet with the pt to discuss transition of care. The pt did not have a gown or any other covering on. The pt could not communicate appropriately. Unable to discuss discharge plan at this time. telesitter at bedside.  Joel Santoyo Rn

## 2020-12-17 NOTE — PROGRESS NOTES
Palliative Care Department  [phone]  Palliative Care Progress Note  Provider Mickey Michaud  [id number]  Hospital Day: 6    Referring Provider: Alejandra Trejo MD  Palliative Medicine was consulted for assistance with: Symptom management, goals of care, CODE STATUS and family support    Brief summary:  April Green is a 44 y.o. who presented to the hospital with chief complaints of back pain and was admitted 12/12/2020 with diagnosis(ses) of malignant neoplasm metastatic to bone, lung mass, pathologic fracture of vertebrae due to neoplastic disease. Patient has started workup for malignancy. ASSESSMENT/PLAN:   Pertinent hospital diagnoses:  1. Metastatic cancer- unknown primary areas involved bone, pancreas, lung, liver, lymph nodes  - workup/multuiple consults await results  - s/p liver biopsy 12/14  -   - EBUS today, distal left mainstem bronchus completely obstructed from vascular mass     2.  Pain associated with neoplasm  -  PCA morphine d/c 12/16 due to increased patient confusion/agitation/pulling at lines  - oxycodone 10-15mg q4 hrs prn, patient using the 15mg every 4 hours  - started methadone 5mg q8 hours on 12/15  - dexamethasone 10 mg IV in am x 3 days- completed  - protonix IV prophylaxis  -  EKG screening normal QTc, ok for methadone  - increased neurontin to 400mg TID  - reports back pain is 10/10 despite all pain medication     3.  GI  - senna-s 2 tabs bid  - miralax 17 gm daily (make PRN if loose BM)    PALLIATIVE CARE ENCOUNTER  - Capacity: At this time, April Green, Does have capacity for medical decision-making.   Capacity is time limited and situation/question specific  - Outcome of goals of care meeting: adjustment of pain medication  - Code Status:   full  - Advanced directives: None  - Surrogate decision maker/Legal NOK:     Contacts: Sander Record [phone]  - Spiritual assessment: none  - Bereavement and grief: diagnosis and young age  - DISPO: workup for malignancy, pain control    Referrals to: none today    Subjective   Chart reviewed  Patient currently undergoing work-up for malignancy which is included consults to hepatobiliary and pancreatic surgery, hematology/oncology, neurosurgery, pulmonology and interventional radiology. Rates pain 10/10 midback, very agitated, rapid speech, states there is a drink in his bedside stand but on examination there was no drink there. Difficult to direct, anxious. Primary team aware. Was started on prn ativan yesterday. Per bedside nurse his agitation does improve with the ativan. No change in pain score since d/c PCA and increasing oxycodone dose. Inpatient medications reviewed: yes  Home Medications reviewed: yes    OBJECTIVE:   Prognosis: Poor    Physical Exam:  BP (!) 142/78   Pulse 108   Temp 98 °F (36.7 °C)   Resp 17   Ht 5' 9\" (1.753 m)   Wt 195 lb (88.5 kg)   SpO2 98%   BMI 28.80 kg/m²   Gen:  Appears stated age, restless, difficult to direct  HEENT:  Normocephalic, atraumatic, mucosa dry, PERRLA, EOMI, sclera anicteric  Neck:  Supple, trachea midline, no JVD  Lungs:  respirations unlabored, clear to auscultation bilaterally  Heart[de-identified]  normal S1S2, regular rate and rhythm  Abd:  Soft, non tender, non distended, bowel sounds present  :  No rogers  Ext:  moving all extremities  Skin:  Warm and dry without rash, bruising, petechiae, scar under left scapula  Neuro:  Alert, oriented  Psych: anxious, moving all over the bed, agitated, restless, not directable, angry    Objective data reviewed: labs, images, records, medication use, vitals and chart    Time/Communication  Greater than 50% of time spent, total 25 minutes in counseling and coordination of care at the bedside/over the telephone regarding symptom management and see above. Thank you for allowing Palliative Medicine to participate in the care of Jeanna Lozada.       Provider Danette Neri DO  Palliative Medicine

## 2020-12-17 NOTE — PLAN OF CARE
Cate Chol thrashing around quite a bit and restless at start of shift. Appears to have somewhat incr restlessness b/o verbal bantering, etc w roommate. Speaks also of discontent w pain control. However, after d/w MD and implementation of ativan prns, marissa appears to have done a bit better. Able to rest.    Resumed thrashing about and removing pca breating monitor briefly at approx 1930. One mg iv ativan effective.

## 2020-12-17 NOTE — PROGRESS NOTES
Scheduled Medications    polyethylene glycol  17 g Oral Daily    lidocaine PF  5 mL Nebulization Once    sodium chloride flush  10 mL Intravenous 2 times per day    methadone  5 mg Oral 3 times per day    gabapentin  400 mg Oral TID    amLODIPine  10 mg Oral Daily    carvedilol  25 mg Oral BID WC    folic acid  1 mg Oral Daily    furosemide  40 mg Oral Daily    hydrALAZINE  75 mg Oral 3 times per day    mirtazapine  15 mg Oral Nightly    enoxaparin  40 mg Subcutaneous Daily    pantoprazole  40 mg Intravenous Daily    And    sodium chloride (PF)  10 mL Intravenous Daily     PRN Meds: sodium chloride flush, oxyCODONE **OR** oxyCODONE, LORazepam, LORazepam, gadobenate dimeglumine, bisacodyl, acetaminophen, sodium chloride flush    Labs:     Recent Labs     12/15/20  0632 12/16/20  0555 12/17/20  0543   WBC 17.3* 17.7* 21.1*   HGB 13.7 13.3 14.1   HCT 39.7 37.7 40.1    278 279       Recent Labs     12/15/20  0632 12/16/20  0555 12/17/20  0543   * 132 132   K 3.7 3.4* 3.9   CL 94* 94* 94*   CO2 28 25 28   BUN 41* 40* 32*   CREATININE 1.1 1.1 0.9   CALCIUM 9.9 9.9 10.3*       Recent Labs     12/15/20  0632 12/16/20  0555 12/17/20  0543   PROT 7.1 6.8 6.9   ALKPHOS 69 66 71   ALT 19 24 27   AST 17 27 28   BILITOT 0.4 0.5 0.7     Chronic labs:    Lab Results   Component Value Date    INR 1.1 12/14/2020       Radiology:     +++++++++++++++++++++++++++++++++++++++++++++++++  [address], [place]  +++++++++++++++++++++++++++++++++++++++++++++++++  NOTE: This report was transcribed using voice recognition software. Every effort was made to ensure accuracy; however, inadvertent computerized transcription errors may be present.

## 2020-12-17 NOTE — PROGRESS NOTES
Mk William M.D.,[facility]  Meera Helm D.O., F.MELISSA.C.O.I., Tung Ayala M.D. Annabella Marti M.D., Mildred Henry M.D. Michele Muniz D.O. Daily Pulmonary Progress Note    Patient:  Sonja Currie 44 y.o. male MRN: [id number]     Date of Service: 12/17/2020      Synopsis     We are following patient for lung mass with mets to liver and bone    \"CC\" confused     Code status:prior      Subjective      Patient was seen and examined. PT has had increase in confusion , he can not give me the correct date now requiring a telesitter, will have nursing contact palliative. PCA stopped yesterday , he has received a total of 5 mg ativan in the last 24 hours along with multiple other pain agents  , appears very out of it can not have conversation with pt . Does tell me he is sob , I did check his sat 96%  On RA . Review of Systems:  Constitutional: Denies fever, weight loss, night sweats, and fatigue  Skin: Denies pigmentation, dark lesions, and rashes   HEENT: Denies hearing loss, tinnitus, ear drainage, epistaxis, sore throat, and hoarseness. Cardiovascular: Denies palpitations, chest pain, and chest pressure. Respiratory: Denies cough, dyspnea at rest and with any to believe, hemoptysis, apnea, and choking.   Gastrointestinal: Denies nausea, vomiting, poor appetite, diarrhea, heartburn or reflux  Genitourinary: Denies dysuria, frequency, urgency or hematuria  Musculoskeletal: Denies myalgias, muscle weakness, and +bone pain, +back pain     24-hour events:  Post bronchoscopy     Objective   Vitals: BP (!) 142/78   Pulse 108   Temp 98 °F (36.7 °C)   Resp 17   Ht 5' 9\" (1.753 m)   Wt 195 lb (88.5 kg)   SpO2 98%   BMI 28.80 kg/m²     I/O:    Intake/Output Summary (Last 24 hours) at 12/17/2020 1110  Last data filed at 12/16/2020 1925  Gross per 24 hour   Intake 700 ml   Output --   Net 700 ml       Vent Information  FiO2 : 2 %  SpO2: 98 %  SpO2/FiO2 ratio: 5863 CURRENT MEDS :  Scheduled Meds:   lidocaine PF  5 mL Nebulization Once    sodium chloride flush  10 mL Intravenous 2 times per day    methadone  5 mg Oral 3 times per day    gabapentin  400 mg Oral TID    amLODIPine  10 mg Oral Daily    carvedilol  25 mg Oral BID WC    folic acid  1 mg Oral Daily    furosemide  40 mg Oral Daily    hydrALAZINE  75 mg Oral 3 times per day    mirtazapine  15 mg Oral Nightly    enoxaparin  40 mg Subcutaneous Daily    pantoprazole  40 mg Intravenous Daily    And    sodium chloride (PF)  10 mL Intravenous Daily       Physical Exam:  General Appearance: appears comfortable in no acute distress. HEENT: Normocephalic atraumatic without obvious abnormality   Neck: Lips, mucosa, and tongue normal.  Supple, symmetrical, trachea midline, no adenopathy;thyroid:  no enlargement/tenderness/nodules or JVD. Lung: Breath sounds CTA. Respirations   unlabored. Symmetrical expansion. Heart: RRR, normal S1, S2. No MRG  Abdomen: Soft, NT, ND. BS present x 4 quadrants. No bruit or organomegaly. Extremities: Pedal pulses 2+ symmetric b/l. Extremities normal, no cyanosis, clubbing, or edema. Musculokeletal: No joint swelling, no muscle tenderness. ROM normal in all joints of extremities. Neurologic: Mental status: Alert and Oriented X3 .     Pertinent/ New Labs and Imaging Studies     Imaging Personally Reviewed:none  Labs:  Lab Results   Component Value Date    WBC 21.1 12/17/2020    HGB 14.1 12/17/2020    HCT 40.1 12/17/2020    MCV 91.3 12/17/2020    MCH 32.1 12/17/2020    MCHC 35.2 12/17/2020    RDW 12.9 12/17/2020     12/17/2020    MPV 9.2 12/17/2020     Lab Results   Component Value Date     12/17/2020    K 3.9 12/17/2020    CL 94 12/17/2020    CO2 28 12/17/2020    BUN 32 12/17/2020    CREATININE 0.9 12/17/2020    LABALBU 3.9 12/17/2020    CALCIUM 10.3 12/17/2020    GFRAA >60 12/17/2020    LABGLOM >60 12/17/2020     Lab Results   Component Value Date    PROTIME 12.7 12/14/2020    INR 1.1 12/14/2020     No results for input(s): PROBNP in the last 72 hours. No results for input(s): PROCAL in the last 72 hours. This SmartLink has not been configured with any valid records. Micro:  No results for input(s): CULTRESP in the last 72 hours. No results for input(s): LABGRAM in the last 72 hours. No results for input(s): LEGUR in the last 72 hours. No results for input(s): STREPNEUMAGU in the last 72 hours. No results for input(s): LP1UAG in the last 72 hours. Assessment:    1. Lung cancer with metastasis to thoracis and lumbar spine,2.4x3x3.2 cm diameter  pancreas, and liver, brain   2. Polysubstance abuse , hx of overdose 2017  3. Major depressive disorder   4. Nicotine dependence       Plan:   1. Keep pulse oximetry >92% , currenlty on RA 98%  2. palliative medicine following managing morphine PCA  3. IR liver biopsy 12/14 await path, s/p 12/16bronchoscopy with Large Heterogeneous vascular endobronchial lesion obstructing the left distal mainstem  4. Oncology following -bone scan with multiple metastatic lesions widespread  5. neurosurg following ,back brace   6.  palliative med managing pain:ativan 5 mg since last evening ,morphine pca was stopped yesterday, prn morphine 2 mg prn d/c yesterday , oxycodone 15 mg IR Q 4 hrs PRN  ,methadone 5 mg TID  ,Decadron has been stopped   7. Will likely need rehab          This plan of care was reviewed in collaboration with Dr. Gaudencio Arevalo  Electronically signed by STEVE Wesley on 12/17/2020 at 11:10 AM    I personally saw, examined, and cared for the patient. Labs, medications, radiographs reviewed. I agree with history exam and plans detailed in NP note.   Janeth Walton MD

## 2020-12-18 NOTE — CARE COORDINATION
Received call from Kiley , (ex-wife) [phone]. She said that the pt has a son and daughter, who are willing to make decisions for the pt. Cristina Hunt (son), Ph. [phone]. Kenyetta Fonseca, Ph. [phone]. Perfect-served Dr Santa King and Angel Abdi to inform them the the family would like to be updated about the pt's condition.  Alexis Stewart RN

## 2020-12-18 NOTE — PROGRESS NOTES
Palliative Care Department  [phone]  Palliative Care Progress Note  Provider Mickey Michaud  [id number]  Hospital Day: 7    Referring Provider: Mp Perea MD  Palliative Medicine was consulted for assistance with: Symptom management, goals of care, CODE STATUS and family support    Brief summary:  Helga Rees is a 44 y.o. who presented to the hospital with chief complaints of back pain and was admitted 12/12/2020 with diagnosis(ses) of malignant neoplasm metastatic to bone, lung mass, pathologic fracture of vertebrae due to neoplastic disease. Patient has started workup for malignancy. ASSESSMENT/PLAN:   Pertinent hospital diagnoses:  1. Metastatic cancer- unknown primary areas involved bone, pancreas, lung, liver, lymph nodes  - workup/multuiple consults await results  - s/p liver biopsy 12/14  -   - EBUS done, distal left mainstem bronchus completely obstructed from vascular mass     2.  Pain associated with neoplasm  -  PCA morphine d/c 12/16 due to increased patient confusion/agitation/pulling at lines  - oxycodone 10-15mg q4 hrs prn, has only had 2 doses in past 24 hours  - started methadone 5mg q8 hours on 12/15- reporting improved pain control  - dexamethasone 10 mg IV in am x 3 days- completed  - protonix IV prophylaxis  -  EKG screening normal QTc, ok for methadone  - increased neurontin to 400mg TID  - reports back pain is 10/10 despite all pain medication     3.  GI  - senna-s 2 tabs bid  - miralax 17 gm daily (make PRN if loose BM)    PALLIATIVE CARE ENCOUNTER  - Capacity: At this time, Helga Rees, Does have capacity for medical decision-making.   Capacity is time limited and situation/question specific  - Outcome of goals of care meeting: adjustment of pain medication  - Code Status:   full  - Advanced directives: None  - Surrogate decision maker/Legal NOK:     Contacts:    Mckayla Boothe (mother) Erin Christine [phone]  - Spiritual assessment: none  - Bereavement and grief: diagnosis and young age  - DISPO: workup for malignancy, pain control    Referrals to: none today    Subjective   Chart reviewed  Patient calmer today, reports better pain control    Inpatient medications reviewed: yes  Home Medications reviewed: yes    OBJECTIVE:   Prognosis: Poor    Physical Exam:  BP (!) 156/78   Pulse 98   Temp 97.4 °F (36.3 °C) (Temporal)   Resp 18   Ht 5' 9\" (1.753 m)   Wt 195 lb (88.5 kg)   SpO2 96%   BMI 28.80 kg/m²   Gen:  Appears stated age, calmer today  HEENT:  Normocephalic, atraumatic, mucosa dry, PERRLA, EOMI, sclera anicteric  Neck:  Supple, trachea midline, no JVD  Lungs:  respirations unlabored, clear to auscultation bilaterally  Heart[de-identified]  normal S1S2, regular rate and rhythm  Abd:  Soft, non tender, non distended, bowel sounds present  :  No rogers  Ext:  moving all extremities  Skin:  Warm and dry, scar under left scapula, mild erythema left flank and right arm  Neuro:  Alert, remains confused  Psych: less anxious and agitated today    Objective data reviewed: labs, images, records, medication use, vitals and chart    Time/Communication  Greater than 50% of time spent, total 15 minutes in counseling and coordination of care at the bedside/over the telephone regarding symptom management and see above. Thank you for allowing Palliative Medicine to participate in the care of Chapo Mahan.       Provider 101 [facility]

## 2020-12-18 NOTE — CARE COORDINATION
Patient in need of Guardianship. Patient has a friend listed as emergency contact. Call placed to [facility],  left with  Esther Hansen [phone]. Await return call. Will need expert evaluation completed. SAMANTHA and MELANI aware.

## 2020-12-18 NOTE — PROGRESS NOTES
Hospitalist Progress Note      SYNOPSIS: Patient admitted on 2020 for Lung mass      SUBJECTIVE:    Patient seen and examined  Records reviewed. RN staff reports am labs reveal hypokalemia    Large left hilar mass 5.4 cm  Left upper lobe bronchus occlusion      Severe bronchial narrowing left lower lobe  Left lung apex lesion 1.5cm  Mediastinal adenopathy  Evidence of mets to the liver  Pathologic fracture T8 vertebral body  Metastatic lesions T8-T12  Metastatic lesion L3  Lateral fracture L3  Right side paraspinal mass  Metastatic lesion left iliac bone  Awaits pathology results    Patient has been confused  He has received 3 doses of decadron  iniiatl dose      Temp (24hrs), Av.5 °F (36.4 °C), Min:97.3 °F (36.3 °C), Max:97.8 °F (36.6 °C)    DIET: DIET GENERAL;  CODE: Prior    Intake/Output Summary (Last 24 hours) at 2020 1134  Last data filed at 2020 0932  Gross per 24 hour   Intake 1080 ml   Output --   Net 1080 ml       OBJECTIVE:    BP (!) 156/78   Pulse 98   Temp 97.4 °F (36.3 °C) (Temporal)   Resp 18   Ht 5' 9\" (1.753 m)   Wt 195 lb (88.5 kg)   SpO2 96%   BMI 28.80 kg/m²          General appearance: Awake but confused     .   Neck: Trachea midline   respiratory: No active wheeze on auscultation   cardiovascular: Pulse rate regular audible S1-S2 no positional orthopnea   abdomen:  Active bowel sounds without guarding or rigidity     Neurologic: awake-eyes open spontaneous, but confused  Patient does not answer all questions appropriately but improved as compared to yesterday     Today   Can say no ifs, ands, or butts  Cannot spell the word \"world\" backwards despite several attempts  Patient is  following commands  GCS 13     However does display innapropriate behaviours  During my eval initially not participating in exam or following requests  However he eventaully complies  ASSESSMENT:  Hypokalemia mild  Confusion appears to be lifting  Lung mass left hilar  Mediastinal adenopathy  Pathologic lumbar fracture  Mets to the liver and to the bone  Confusion  Mild hyponatremia   Mild hypokalemia resolved  Mild hypercalcemia  Leukocytosis without bandemia- likely due to effects off  decadron         PLAN:    Replace potassium  Recheck with am labs tommrow  Await results of biopsy  Monitor serum sodium  Monitors total calcium  Follow up ionized calcium  DISPOSITION: tbd      Social  At the request of nursing staff  I did speak to daughter for this patient  By phone today  Kiera Garcia [phone] Gloria Odell ,[place])  I did update her regarding current findings  And answered the questions she posed to the best of my abilities  She appeared appreciative of the information provided and of the time to discuss  The current findings  She indicated that she would step forward as the point of contact her primary contact for this patient    Her brother is also listed as a contact  [phone]  However when I called him earlier  This gentleman was in the middle of an appointment  And he requested that I contact the patient's daughter Karla Campuzano)  Medications:  REVIEWED DAILY    Infusion Medications   Scheduled Medications    polyethylene glycol  17 g Oral Daily    lidocaine PF  5 mL Nebulization Once    sodium chloride flush  10 mL Intravenous 2 times per day    methadone  5 mg Oral 3 times per day    gabapentin  400 mg Oral TID    amLODIPine  10 mg Oral Daily    carvedilol  25 mg Oral BID WC    folic acid  1 mg Oral Daily    furosemide  40 mg Oral Daily    hydrALAZINE  75 mg Oral 3 times per day    mirtazapine  15 mg Oral Nightly    enoxaparin  40 mg Subcutaneous Daily    pantoprazole  40 mg Intravenous Daily    And    sodium chloride (PF)  10 mL Intravenous Daily     PRN Meds: sodium chloride flush, oxyCODONE **OR** oxyCODONE, LORazepam, LORazepam, gadobenate dimeglumine, bisacodyl, acetaminophen, sodium chloride flush    Labs:     Recent Labs     12/16/20  1163 12/17/20  0543 12/18/20  0517   WBC 17.7* 21.1* 22.9*   HGB 13.3 14.1 13.2   HCT 37.7 40.1 38.1    279 290       Recent Labs     12/16/20  0555 12/17/20  0543 12/18/20  0517    132 136   K 3.4* 3.9 3.1*   CL 94* 94* 94*   CO2 25 28 30*   BUN 40* 32* 27*   CREATININE 1.1 0.9 0.9   CALCIUM 9.9 10.3* 10.0       Recent Labs     12/16/20  0555 12/17/20  0543 12/18/20  0517   PROT 6.8 6.9 6.4   ALKPHOS 66 71 66   ALT 24 27 25   AST 27 28 23   BILITOT 0.5 0.7 0.6       Chronic labs:    Lab Results   Component Value Date    INR 1.1 12/14/2020       Radiology:     +++++++++++++++++++++++++++++++++++++++++++++++++  [facility], [place]  +++++++++++++++++++++++++++++++++++++++++++++++++  NOTE: This report was transcribed using voice recognition software. Every effort was made to ensure accuracy; however, inadvertent computerized transcription errors may be present.

## 2020-12-18 NOTE — PROGRESS NOTES
Hematology/Oncology Inpatient f/up:      Reason for Visit:   Metastatic Disease. Subjective: The patient is lethargic, responds to verbal stimuli. He is complaining of back pain. Physical Exam:  BP (!) 131/98   Pulse 105   Temp 97 °F (36.1 °C) (Temporal)   Resp 20   Ht 5' 9\" (1.753 m)   Wt 195 lb (88.5 kg)   SpO2 100%   BMI 28.80 kg/m²   GENERAL: Lethargic, responds to verbal stimuli, oriented x2. HEENT: PERRLA; EOMI. Oropharynx clear. NECK: Supple. No palpable cervical or supraclavicular lymphadenopathy. LUNGS: Decreased air entry bilaterally  CARDIOVASCULAR: Regular rhythm, tachycardic. ABDOMEN: Soft. Non-tender, non-distended. Positive bowel sounds. EXTREMITIES: Without clubbing, cyanosis, or edema. NEUROLOGIC: No focal deficits.    ECOG PS 1     Impression/Plan:      Mr. Joseph Johnston is a 26-year-old male patient, with a past medical history significant for tobacco use disorder, degenerative joint disease, hypertension, liver disease, polysubstance abuse who had presented with worsening back pain, he was found to have metastatic disease, he has a large heterogeneous left hilar mass measuring about 5.4 cm, with occlusion of the left upper lobe bronchus with severe bronchial narrowing to the left lower lobe, left lung apex lesion measuring 1.5 cm, mediastinal adenopathy, metastasis to the liver, possibly the pancreas (vs primary pancreatic), and the spine,MRI of the thoracic spine had revealed metastatic lesions in the T8 and T12 vertebral bodies, pathologic fracture of the T8 vertebral body with approximately 40% loss of height, epidural extension of tumor along the left lateral aspect of the T8, extending into the left T8-9 neural foramina, moderate central canal stenosis at T8, no cord lesion or cord edema seen, he also has a destructive lesion along the right lateral aspect of the L3 vertebral body, lumbar spine MRI has revealed metastatic lesion in the L3 vertebral body, extending into the right pedicle and superior sacral facets, lateral fracture of L3 with approximately 60% loss of height, metastatic infiltration along the endplates I5-W4, infiltration of tumor into the epidural space at L3 resulting in severe stenosis of the central canal lateral recess on the right neural foramina: Mild right paraspinal mass contiguous with the venous occlusions and extending from the level of L2-L5 S1, the mass exerts mass-effect on the sulcus which is laterally displaced. Small metastatic lesion in the left iliac bone. -Brain MRI had revealed a small extra-axial enhancing mass, measuring up 16mm, this may represent a meningioma, however given her history of malignancy short-term follow-up in 3 months was recommended. Bone scan has revealed widespread metastatic disease to the bones. - The patient was evaluated by neurosurgery, no surgical interventions were recommended. Rad Onc consulted for palliative RT to the spine, d/w nursing, the consult will be called today. - Pain Control.  -Palliative medicine team on board.  -The patient is status post liver biopsy, and bronchoscopy/EBUS, pathology from the left endobronchial mass and liver are consistent with non-small cell lung cancer, squamous cell carcinoma with basaloid features. In conjunction with the clinical history of a lung mass, the tumor seen in the liver most likely represents metastasis from a lung primary. PD-L1 pending. Await PD-L1 results to formulate the final treatment plan, NGS (foundation 1 testing) was ordered. - Mild hypercalcemia, secondary to bony mets, calcium level has normalized. Will continue to follow. Thank you for allowing us to participate in the care of Mr. Chiang.     Helena Nava MD   HEMATOLOGY/MEDICAL ONCOLOGY  [facility] [address] [address]  Dept: [address]: [phone]

## 2020-12-18 NOTE — PLAN OF CARE
Eris Larios at times coherent for brief time this am when speaking to neuroS. Continues to request dc to home despite broken back and uncontrolled pain. Rapid speech persists but able to slow down w added tlc. Appeared to underst but not accept the medical team's facil placement recommendations. We have been providing the iv and po ativan today b/o high restlessness. Also the oxy 15 q 4 b/o severe back pain fr lumbar fx. He does a variety of self positioning, usually can't keep clothes or blankets or sheets on b/o erratic movements. We have fed him Lunch and supper b/o his inability to focus. Was able to briefly sit up and feed himself his own bkfst.   Sm amts consumed at each meal.        At approx 4p, his o2 sat went down to 74.   2 lpm nc placed. 96%    Nohemy in contact w us x 5 ( at least during the day ). Bed alarm going off roughly 4 x during 12 h shift b/o the unusual positions he put himself in. Posture adjust and underpad change x 6-7 ( at least ) .

## 2020-12-18 NOTE — PROGRESS NOTES
Jhonny Kumar M.D.,[facility]  Gera Gamez D.O., F.A.C.O.I., Nery Shahid M.D. Naomi Mclean M.D., Arcenio Phan M.D. Marj Edward D.O. Daily Pulmonary Progress Note    Patient:  Keysha Thomson 44 y.o. male MRN: [id number]     Date of Service: 12/18/2020      Synopsis     We are following patient for lung mass with mets to liver and bone    \"CC\" confused     Code status:prior      Subjective      Patient was seen and examined. Patient is more alert today able to answer my questions has the correct year he does start to fall asleep during our conversation multiple times. Still has a telesitter. Review of Systems:  Constitutional: Denies fever, weight loss, night sweats, and fatigue  Skin: Denies pigmentation, dark lesions, and rashes   HEENT: Denies hearing loss, tinnitus, ear drainage, epistaxis, sore throat, and hoarseness. Cardiovascular: Denies palpitations, chest pain, and chest pressure. Respiratory: Denies cough, dyspnea at rest and with any to believe, hemoptysis, apnea, and choking.   Gastrointestinal: Denies nausea, vomiting, poor appetite, diarrhea, heartburn or reflux  Genitourinary: Denies dysuria, frequency, urgency or hematuria  Musculoskeletal: Denies myalgias, muscle weakness, and +bone pain, +back pain     24-hour events:  Pain control    Objective   Vitals: BP (!) 156/78   Pulse 98   Temp 97.4 °F (36.3 °C) (Temporal)   Resp 18   Ht 5' 9\" (1.753 m)   Wt 195 lb (88.5 kg)   SpO2 96%   BMI 28.80 kg/m²     I/O:    Intake/Output Summary (Last 24 hours) at 12/18/2020 1202  Last data filed at 12/18/2020 0932  Gross per 24 hour   Intake 1080 ml   Output --   Net 1080 ml       Vent Information  FiO2 : 2 %  SpO2: 96 %  SpO2/FiO2 ratio: 4750                CURRENT MEDS :  Scheduled Meds:   polyethylene glycol  17 g Oral Daily    lidocaine PF  5 mL Nebulization Once    sodium chloride flush  10 mL Intravenous 2 times per day    methadone  5 mg Oral 3 times per day    gabapentin  400 mg Oral TID    amLODIPine  10 mg Oral Daily    carvedilol  25 mg Oral BID WC    folic acid  1 mg Oral Daily    furosemide  40 mg Oral Daily    hydrALAZINE  75 mg Oral 3 times per day    mirtazapine  15 mg Oral Nightly    enoxaparin  40 mg Subcutaneous Daily    pantoprazole  40 mg Intravenous Daily    And    sodium chloride (PF)  10 mL Intravenous Daily       Physical Exam:  General Appearance: appears comfortable in no acute distress. HEENT: Normocephalic atraumatic without obvious abnormality   Neck: Lips, mucosa, and tongue normal.  Supple, symmetrical, trachea midline, no adenopathy;thyroid:  no enlargement/tenderness/nodules or JVD. Lung: Breath sounds CTA. Respirations   unlabored. Symmetrical expansion. Heart: RRR, normal S1, S2. No MRG  Abdomen: Soft, NT, ND. BS present x 4 quadrants. No bruit or organomegaly. Extremities: Pedal pulses 2+ symmetric b/l. Extremities normal, no cyanosis, clubbing, or edema. Musculokeletal: No joint swelling, no muscle tenderness. ROM normal in all joints of extremities. Neurologic: Mental status: Alert and Oriented X3 . Pertinent/ New Labs and Imaging Studies     Imaging Personally Reviewed:none  Labs:  Lab Results   Component Value Date    WBC 22.9 12/18/2020    HGB 13.2 12/18/2020    HCT 38.1 12/18/2020    MCV 91.6 12/18/2020    MCH 31.7 12/18/2020    MCHC 34.6 12/18/2020    RDW 12.8 12/18/2020     12/18/2020    MPV 9.3 12/18/2020     Lab Results   Component Value Date     12/18/2020    K 3.1 12/18/2020    CL 94 12/18/2020    CO2 30 12/18/2020    BUN 27 12/18/2020    CREATININE 0.9 12/18/2020    LABALBU 3.7 12/18/2020    CALCIUM 10.0 12/18/2020    GFRAA >60 12/18/2020    LABGLOM >60 12/18/2020     Lab Results   Component Value Date    PROTIME 12.7 12/14/2020    INR 1.1 12/14/2020     No results for input(s): PROBNP in the last 72 hours. No results for input(s): PROCAL in the last 72 hours.   This SmartLink has not been configured with any valid records. Micro:  Recent Labs     12/16/20  1213   CULTRESP Oral Pharyngeal Justyna reduced     No results for input(s): LABGRAM in the last 72 hours. No results for input(s): LEGUR in the last 72 hours. No results for input(s): STREPNEUMAGU in the last 72 hours. No results for input(s): LP1UAG in the last 72 hours. Assessment:    1. Lung cancer with metastasis to thoracis and lumbar spine,2.4x3x3.2 cm diameter  pancreas, and liver, brain   2. Polysubstance abuse , hx of overdose 2017  3. Major depressive disorder   4. Nicotine dependence       Plan:   1. Keep pulse oximetry >92% , currenlty on RA 98%  2. palliative medicine following managing morphine PCA  3. IR liver biopsy 12/14 await path, s/p 12/16bronchoscopy with Large Heterogeneous vascular endobronchial lesion obstructing the left distal mainstem  4. Oncology following -bone scan with multiple metastatic lesions widespread  5. neurosurg following ,back brace   6.  palliative med managing pain  7. Will likely need rehab          This plan of care was reviewed in collaboration with Dr. iLno Hernandez  Electronically signed by STEVE Mcfarland on 12/18/2020 at 12:02 PM    Addendum:     Liver biopsy results consistent with squamous cell carcinoma. Bronchial smears from left mainstem positive for squamous cell carcinoma. Further management for this is stage IV squamous cell for hematology oncology and radiation oncology  Palliative care is on board, managing his symptoms. Would also recommend to discuss CODE STATUS once patient is more awake and alert at his baseline.     Po Francis MD

## 2020-12-19 NOTE — PROGRESS NOTES
Assessment: Patient was ordered another 36 MEQ of potassium, verified with MD that the other 40 that was recently ordered could be discontinues. Also just wanted to update on patient pressures and inform them that blood pressure medications have been held this shift. Perfect Serve  RN  [phone] From: Brandi Hall [address] Mehnaz Cueva RE: Hitesh Galdamez Just wanted to let you know that patient already had 40 MEQ of potassium after his labs this am. Patient blood pressures have also been running on the lower side so I have held his blood pressure medications this shift and he is staying WNL.  Last pressure read was 122/88 and pulse  Was 90  Read 4:45 PM     MD  12/19/20 4:45 PM   Ok

## 2020-12-19 NOTE — PROGRESS NOTES
fracture of L3 with approximately 60% loss of height, metastatic infiltration along the endplates P4-G4, infiltration of tumor into the epidural space at L3 resulting in severe stenosis of the central canal lateral recess on the right neural foramina: Mild right paraspinal mass contiguous with the venous occlusions and extending from the level of L2-L5 S1, the mass exerts mass-effect on the sulcus which is laterally displaced. Small metastatic lesion in the left iliac bone. -Brain MRI had revealed a small extra-axial enhancing mass, measuring up 16mm, this may represent a meningioma, however given her history of malignancy short-term follow-up in 3 months was recommended. Bone scan has revealed widespread metastatic disease to the bones. - The patient was evaluated by neurosurgery, no surgical interventions were recommended. Rad Onc consulted for palliative RT to the spine, d/w nursing.  - Pain Control.  -Palliative medicine team on board.  -The patient is status post liver biopsy, and bronchoscopy/EBUS, pathology from the left endobronchial mass and liver are consistent with non-small cell lung cancer, squamous cell carcinoma with basaloid features. In conjunction with the clinical history of a lung mass, the tumor seen in the liver most likely represents metastasis from a lung primary. PD-L1 pending. Await PD-L1 results to formulate the final treatment plan, NGS (foundation 1 testing) was ordered. - Mild hypercalcemia, secondary to bony mets, calcium 10.4 today, if worsening hypercalcemia, will treat with bisphonates. Will continue to follow. Thank you for allowing us to participate in the care of Mr. Chiang.     Dominik Lantigua MD   HEMATOLOGY/MEDICAL ONCOLOGY  [facility]  [facility] [address] [address]  Dept: [address]: [phone]

## 2020-12-19 NOTE — PROGRESS NOTES
Hospitalist Progress Note      Synopsis: Patient admitted on 12/12/2020    Subjective     Patient admitted on the 12th with severe back pain  For the investigations have resulted in a diagnosis of left lung apex lesion with adenopathy with metastasis to the liver and spine. Also evaluated by neurosurgery with no intervention recommended. Palliative treatment to the spine. Palliative team following as well as pain control being attempted. He is already had bronchoscopy with EBUS. 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 Awaiting PD-L1 results to finalize  Exam:  BP 91/76   Pulse 106   Temp 98.6 °F (37 °C) (Temporal)   Resp 16   Ht 5' 9\" (1.753 m)   Wt 195 lb (88.5 kg)   SpO2 97%   BMI 28.80 kg/m²   General appearance: No apparent distress, appears stated age and cooperative. HEENT: Pupils equal, round, and reactive to light. Conjunctivae/corneas clear. Neck: Supple. No jugular venous distention. Trachea midline. Respiratory:  Normal respiratory effort. Clear to auscultation, bilaterally without Rales/Wheezes/Rhonchi. Cardiovascular: Regular rate and rhythm with normal S1/S2 without murmurs, rubs or gallops. Abdomen: Soft, non-tender, slightly distended distended with normal bowel sounds. Musculoskeletal: No clubbing, cyanosis or edema bilaterally. Brisk capillary refill. 2+ lower extremity pulses (dorsalis pedis).    Skin:  No rashes    Neurologic: awake, alert and following commands and seems his same irritable self    Medications:  Reviewed    Infusion Medications   Scheduled Medications    polyethylene glycol  17 g Oral Daily    lidocaine PF  5 mL Nebulization Once    sodium chloride flush  10 mL Intravenous 2 times per day    methadone  5 mg Oral 3 times per day    gabapentin  400 mg Oral TID    amLODIPine  10 mg Oral Daily    carvedilol  25 mg Oral BID WC    folic acid  1 mg into the right pedicle and superior articular facet. Pathological fracture of L3, with approximately 60% loss of height. 2. Metastatic infiltration along the endplates L3 and L4. No pathological fracture seen at these levels. 3. Infiltration of tumor into the epidural space at L3 resulting in [address], LATERAL RECESSES AND THE RIGHT NEURAL FORAMINA. 4. Large right paraspinal mass is contiguous with the bony metastatic lesions and extends from the level of L2 to L5-S1. The mass exerts mass effect on the psoas, which is laterally displaced. Areas of cystic degeneration are seen right paraspinal mass at the level of L4 and L5. 5. Partially included in the field of view of this study is a small metastatic lesion in the left iliac bone    Ct Abdomen Pelvis W Iv Contrast Additional Contrast? None    Result Date: 12/12/2020  Hypodense lesions in the right lobe of the liver, the larger measuring 3.5 cm in diameter and was consistent with metastatic disease. Mildly heterogeneous mass extending cephalad from the head of the pancreas, most suspicious for pancreatic malignancy measuring approximately 2.4 x 3 x 3.2 cm in diameter. Multiple lytic lesions as described involving the T8 vertebral body, T12 vertebral body, L3 vertebral body, and proximal left femur. Pathologic compression fracture of L3 and extensive extension of malignant process to the paraspinal soft tissues anteriorly and to the right of L3, to the adjacent intervertebral discs, and spinal canal.  Extension into the right pedicle and right articular facet and large component extending to the right iliopsoas muscle which is asymmetrically enlarged. Hypodense process extending caudally along the course of the right psoas muscle with mild peripheral enhancement, likely malignant involvement versus associated abscess, felt to be a less likely consideration. Intraspinal extension of malignant process at the level of T8, and L3.     Ct Needle Biopsy Liver Percutaneous    Result Date: 12/16/2020  Successful uncomplicated CT and fluoroscopic guided liver mass biopsy. If there are any physician concerns regarding this report, a Radiologist can be reached by calling the following number [phone]. Xr Eye Foreign Body    Result Date: 12/13/2020  No evidence of metallic foreign body within the orbits. Nm Bone Scan Whole Body    Result Date: 12/14/2020  Findings compatible with degenerative changes Multiple foci of abnormal tracer uptake, consistent widespread metastatic disease. Mri Brain W Wo Contrast    Result Date: 12/14/2020  1. There is a small extra-axial enhancing mass, which appears to span the right and left aspects of the mid falx. This measures up to 16 mm. No significant adjacent parenchymal edema is seen. This may represent a meningioma. However, given history of malignancy, suggest a short-term follow-up examination in 3 months to evaluate for stability. 2. Otherwise, no acute intracranial abnormality. No acute infarct. 3. Mild global parenchymal volume loss with mild-to-moderate chronic microvascular ischemic changes. ASSESSMENT:    Principal Problem:    Lung mass  Active Problems:    Pathologic lumbar vertebral fracture, initial encounter    Malignant neoplasm metastatic to bone [facility])    Metastatic cancer to liver [facility])    Lung cancer metastatic to bone [facility])  Resolved Problems:    * No resolved hospital problems. *       PLAN:    Palliative services is managing his pain  Await further testing reports to decide about treatment plan. He is aware of his diagnosis however tells me he will go down fighting  Leukocytosis noted.   Potassium again slightly low and will need replenished    Diet: DIET GENERAL;  Code Status: Prior    DVT Prophylaxis:   In place  Recommended disposition at discharge:  unsure yet    +++++++++++++++++++++++++++++++++++++++++++++++++  Cecille Young MD   [facility].  +++++++++++++++++++++++++++++++++++++++++++++++++  NOTE: This report was transcribed using voice recognition software. Every effort was made to ensure accuracy; however, inadvertent computerized transcription errors may be present.

## 2020-12-20 NOTE — PROGRESS NOTES
Hospitalist Progress Note      Synopsis: Patient admitted on 12/12/2020    Subjective     Patient admitted on the 12th with severe back pain  For the investigations have resulted in a diagnosis of left lung apex lesion with adenopathy with metastasis to the liver and spine. Also evaluated by neurosurgery with no intervention recommended. Palliative treatment to the spine. Palliative team following as well as pain control being attempted. He is already had bronchoscopy with EBUS. 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 Awaiting PD-L1 results to finalize  Dec 20 2020  Other than constipation he appears to be comfortable. Awaiting his radiation plan  Exam:  /72   Pulse 96   Temp 97.8 °F (36.6 °C) (Temporal)   Resp 16   Ht 5' 9\" (1.753 m)   Wt 195 lb (88.5 kg)   SpO2 95%   BMI 28.80 kg/m²   General appearance: No apparent distress, appears stated age and cooperative. HEENT: Pupils equal, round, and reactive to light. Conjunctivae/corneas clear. Neck: Supple. No jugular venous distention. Trachea midline. Respiratory:  Normal respiratory effort. Clear to auscultation, bilaterally without Rales/Wheezes/Rhonchi. Cardiovascular: Regular rate and rhythm with normal S1/S2 without murmurs, rubs or gallops. Abdomen: Soft, non-tender, slightly distended distended with normal bowel sounds. Musculoskeletal: No clubbing, cyanosis or edema bilaterally. Brisk capillary refill. 2+ lower extremity pulses (dorsalis pedis).    Skin:  No rashes    Neurologic: awake, alert and following commands and seems his same irritable self    Medications:  Reviewed    Infusion Medications   Scheduled Medications    potassium chloride  40 mEq Oral Once    polyethylene glycol  17 g Oral Daily    lidocaine PF  5 mL Nebulization Once    sodium chloride flush  10 mL Intravenous 2 times per day    methadone  5 mg Oral 3 times per day    gabapentin  400 mg Oral TID    amLODIPine  10 mg Oral Daily    carvedilol  25 mg Oral BID WC    folic acid  1 mg Oral Daily    furosemide  40 mg Oral Daily    hydrALAZINE  75 mg Oral 3 times per day    mirtazapine  15 mg Oral Nightly    enoxaparin  40 mg Subcutaneous Daily    pantoprazole  40 mg Intravenous Daily    And    sodium chloride (PF)  10 mL Intravenous Daily     PRN Meds: sodium chloride flush, oxyCODONE **OR** oxyCODONE, LORazepam, LORazepam, gadobenate dimeglumine, bisacodyl, acetaminophen, sodium chloride flush    I/O    Intake/Output Summary (Last 24 hours) at 12/20/2020 1332  Last data filed at 12/20/2020 1227  Gross per 24 hour   Intake 240 ml   Output 1650 ml   Net -1410 ml       Labs:   Recent Labs     12/18/20  0517 12/19/20  0410 12/20/20  0408   WBC 22.9* 18.8* 17.3*   HGB 13.2 13.8 12.9   HCT 38.1 41.1 39.0    304 257       Recent Labs     12/18/20  0517 12/19/20  0410 12/20/20  0408    137 135   K 3.1* 3.2* 3.4*   CL 94* 94* 93*   CO2 30* 32* 31*   BUN 27* 31* 23*   CREATININE 0.9 1.0 0.9   CALCIUM 10.0 10.4* 10.1       Recent Labs     12/18/20  0517 12/19/20  0410 12/20/20  0408   PROT 6.4 6.6 6.1*   ALKPHOS 66 67 66   ALT 25 25 33   AST 23 19 28   BILITOT 0.6 0.6 0.9       No results for input(s): INR in the last 72 hours. No results for input(s): Larinda Tim in the last 72 hours. Chronic labs:  Lab Results   Component Value Date    INR 1.1 12/14/2020       Radiology:  Xr Femur Left (min 2 Views)    Result Date: 12/13/2020  1. Lytic lesion in the left femoral neck. No additional evidence of osseous metastasis on this exam.  No evidence of pathologic fracture. 2.  Mild left hip joint osteoarthritis. Ct Chest W Contrast    Result Date: 12/12/2020  Large heterogeneous left hilar mass as described consistent with malignancy. Occlusion of left upper lobe bronchus and severe bronchial narrowing to the left lower lobe.  Mildly spiculated nodular lesion, pleural based in the left lung apex likely satellite malignant lesion measuring 1.5 cm. Mediastinal adenopathy likely manifesting malignant involvement. Large metastatic lesion to the T8 vertebral body with paraspinal an intraspinal extension of malignancy and pathologic compression fracture of T8. Ct Lumbar Spine Wo Contrast    Result Date: 12/12/2020  1. Lytic destructive lesion along the right lateral aspect of the L3 vertebral body, involving the right pedicle, transverse process and superior articular facet. 2. Pathological fracture on the right with loss of height by approximately 60%. The lesion may represent malignancy or an infectious process. Destructive changes in the discs characteristic of discitis/osteomyelitis are not evident on this study. Further evaluation with pre and post contrast enhanced MRI is recommended. 3. Soft tissue fullness extends from the L3 vertebral body into the adjacent psoas muscle, which is landis than the contralateral side. The soft tissue component may represent malignancy or infection. 4. Extent of epidural involvement of disease at L3 is not well delineated on this study. Pre and post contrast enhanced MRI should clarify. 5. Variable degrees of degenerative changes, as described. Ct Guided Needle Placement    Result Date: 12/16/2020  Successful uncomplicated CT and fluoroscopic guided liver mass biopsy. If there are any physician concerns regarding this report, a Radiologist can be reached by calling the following number [phone]. Mri Thoracic Spine W Wo Contrast    Result Date: 12/13/2020  1. Metastatic lesions in the T8 and T12 vertebral bodies, more prominent in the former. 2. Pathological fracture of the T8 vertebral body with approximately 40% loss of height. 3. Epidural extension of tumor along the left lateral aspect at T8, extending into the left T8-9 neural foramina.  4. Moderate central canal stenosis at T8. 5. No cord lesion or cord edema seen. Mri Lumbar Spine W Wo Contrast    Result Date: 12/13/2020  1. Metastatic lesion in the L3 vertebral body, extending into the right pedicle and superior articular facet. Pathological fracture of L3, with approximately 60% loss of height. 2. Metastatic infiltration along the endplates L3 and L4. No pathological fracture seen at these levels. 3. Infiltration of tumor into the epidural space at L3 resulting in [address], LATERAL RECESSES AND THE RIGHT NEURAL FORAMINA. 4. Large right paraspinal mass is contiguous with the bony metastatic lesions and extends from the level of L2 to L5-S1. The mass exerts mass effect on the psoas, which is laterally displaced. Areas of cystic degeneration are seen right paraspinal mass at the level of L4 and L5. 5. Partially included in the field of view of this study is a small metastatic lesion in the left iliac bone    Ct Abdomen Pelvis W Iv Contrast Additional Contrast? None    Result Date: 12/12/2020  Hypodense lesions in the right lobe of the liver, the larger measuring 3.5 cm in diameter and was consistent with metastatic disease. Mildly heterogeneous mass extending cephalad from the head of the pancreas, most suspicious for pancreatic malignancy measuring approximately 2.4 x 3 x 3.2 cm in diameter. Multiple lytic lesions as described involving the T8 vertebral body, T12 vertebral body, L3 vertebral body, and proximal left femur. Pathologic compression fracture of L3 and extensive extension of malignant process to the paraspinal soft tissues anteriorly and to the right of L3, to the adjacent intervertebral discs, and spinal canal.  Extension into the right pedicle and right articular facet and large component extending to the right iliopsoas muscle which is asymmetrically enlarged.   Hypodense process extending caudally along the course of the right psoas muscle with mild peripheral enhancement, likely malignant involvement versus associated abscess, felt to be a less likely consideration. Intraspinal extension of malignant process at the level of T8, and L3. Ct Needle Biopsy Liver Percutaneous    Result Date: 12/16/2020  Successful uncomplicated CT and fluoroscopic guided liver mass biopsy. If there are any physician concerns regarding this report, a Radiologist can be reached by calling the following number [phone]. Xr Eye Foreign Body    Result Date: 12/13/2020  No evidence of metallic foreign body within the orbits. Nm Bone Scan Whole Body    Result Date: 12/14/2020  Findings compatible with degenerative changes Multiple foci of abnormal tracer uptake, consistent widespread metastatic disease. Mri Brain W Wo Contrast    Result Date: 12/14/2020  1. There is a small extra-axial enhancing mass, which appears to span the right and left aspects of the mid falx. This measures up to 16 mm. No significant adjacent parenchymal edema is seen. This may represent a meningioma. However, given history of malignancy, suggest a short-term follow-up examination in 3 months to evaluate for stability. 2. Otherwise, no acute intracranial abnormality. No acute infarct. 3. Mild global parenchymal volume loss with mild-to-moderate chronic microvascular ischemic changes. ASSESSMENT:    Principal Problem:    Lung mass  Active Problems:    Pathologic lumbar vertebral fracture, initial encounter    Malignant neoplasm metastatic to bone [facility])    Metastatic cancer to liver [facility])    Lung cancer metastatic to bone [facility])  Resolved Problems:    * No resolved hospital problems. *       PLAN:    Palliative services is managing his pain  Await further testing reports to decide about treatment plan. He is aware of his diagnosis however tells me he will go down fighting  Leukocytosis noted. Potassium again slightly low and will need replenished  December 20, 2020  Appears otherwise okay. Labs showing decreasing leukocytosis.   Slightly low

## 2020-12-20 NOTE — PROGRESS NOTES
Palliative Care Department  [phone]  Palliative Care Progress Note  Provider Quirino WILSON-RACHEL    Chapo Mahan  [id number]  Hospital Day: 9    Referring Provider: Sean Degroot MD  Palliative Medicine was consulted for assistance with: Symptom management, goals of care, CODE STATUS and family support    Brief summary:  Chapo Mahan is a 44 y.o. who presented to the hospital with chief complaints of back pain and was admitted 12/12/2020 with diagnosis(ses) of malignant neoplasm metastatic to bone, lung mass, pathologic fracture of vertebrae due to neoplastic disease. Patient has started workup for malignancy. ASSESSMENT/PLAN:   Pertinent hospital diagnoses:  1. Metastatic cancer- unknown primary areas involved bone, pancreas, lung, liver, lymph nodes  - workup/multuiple consults await results  - s/p liver biopsy 12/14  -   - EBUS done, distal left mainstem bronchus completely obstructed from vascular mass     2.  Pain associated with neoplasm  - oxycodone 10-15mg q4 hrs prn, has only had 1 doses in past 24 hours  - Continue methadone 5mg q8 hours  - protonix IV prophylaxis  -  EKG screening normal QTc, ok for methadone  - increased neurontin to 400mg TID     3.  GI  - senna-s 2 tabs bid  - miralax 17 gm daily (make PRN if loose BM)    PALLIATIVE CARE ENCOUNTER  - Capacity: At this time, Chapo Mahan, Does have capacity for medical decision-making.   Capacity is time limited and situation/question specific  - Outcome of goals of care meeting: adjustment of pain medication  - Code Status:   full  - Advanced directives: None  - Surrogate decision maker/Legal NOK:     Contacts:    Radhika Graham (mother) [phone]   Monie Osborne [phone]  - Spiritual assessment: none  - Bereavement and grief: diagnosis and young age  - DISPO: workup for malignancy, pain control    Referrals to: none today    Subjective   Chart reviewed  Patient alert and oriented today, no signs sedation confusion. Continues utilize methadone 5 mg 3 times a day, and is only utilized OxyIR 15 mg 1 time past 24 hours. He is stating he is currently uncomfortable, does appear to be uncomfortable in the bed, pain worsened by movement. He is encouraged to utilize his OxyIR more frequently if his pain continues to be uncontrolled, as he does get some relief with use this medication. Otherwise no changes to be made at this time. Inpatient medications reviewed: yes  Home Medications reviewed: yes    OBJECTIVE:   Prognosis: Poor    Physical Exam:  /79   Pulse 111   Temp 98.1 °F (36.7 °C) (Temporal)   Resp 16   Ht 5' 9\" (1.753 m)   Wt 195 lb (88.5 kg)   SpO2 94%   BMI 28.80 kg/m²   Gen:  Appears stated age, calmer today  HEENT:  Normocephalic, atraumatic, mucosa dry, PERRLA, EOMI, sclera anicteric  Neck:  Supple, trachea midline, no JVD  Lungs:  respirations unlabored, clear to auscultation bilaterally  Heart[de-identified]  normal S1S2, regular rate and rhythm  Abd:  Soft, non tender, non distended, bowel sounds present  :  No rogers  Ext:  moving all extremities  Skin:  Warm and dry, scar under left scapula, mild erythema left flank and right arm  Neuro:  Alert, remains confused  Psych: less anxious and agitated today    Objective data reviewed: labs, images, records, medication use, vitals and chart    Time/Communication  Greater than 50% of time spent, total 15 minutes in counseling and coordination of care at the bedside/over the telephone regarding symptom management and see above. Thank you for allowing Palliative Medicine to participate in the care of Sophia Epstein. Provider Thanh WILSON-CNP  Palliative Medicine      Note: This report was completed using computerize voice recognition software. Every effort has been made to ensure accuracy; however, inadvertent computerized transcription errors may be present.

## 2020-12-20 NOTE — PROGRESS NOTES
Hematology/Oncology Inpatient f/up:      Reason for Visit:   Metastatic NSCLC. Subjective: The patient is awake and alert, he is complaining of back pain. Physical Exam:  /72   Pulse 96   Temp 97.8 °F (36.6 °C) (Temporal)   Resp 16   Ht 5' 9\" (1.753 m)   Wt 195 lb (88.5 kg)   SpO2 95%   BMI 28.80 kg/m²   GENERAL: Awake and alert, oriented x3. HEENT: PERRLA; EOMI. Oropharynx clear. NECK: Supple. No palpable cervical or supraclavicular lymphadenopathy. LUNGS: Decreased air entry bilaterally  CARDIOVASCULAR: Regular rhythm, tachycardic. ABDOMEN: Soft. Non-tender, non-distended. Positive bowel sounds. EXTREMITIES: Without clubbing, cyanosis, or edema. NEUROLOGIC: No focal deficits.    ECOG PS 1     Impression/Plan:      Mr. Sera Hood is a 52-year-old male patient, with a past medical history significant for tobacco use disorder, degenerative joint disease, hypertension, liver disease, polysubstance abuse who had presented with worsening back pain, he was found to have metastatic disease, he has a large heterogeneous left hilar mass measuring about 5.4 cm, with occlusion of the left upper lobe bronchus with severe bronchial narrowing to the left lower lobe, left lung apex lesion measuring 1.5 cm, mediastinal adenopathy, metastasis to the liver, possibly the pancreas (vs primary pancreatic), and the spine,MRI of the thoracic spine had revealed metastatic lesions in the T8 and T12 vertebral bodies, pathologic fracture of the T8 vertebral body with approximately 40% loss of height, epidural extension of tumor along the left lateral aspect of the T8, extending into the left T8-9 neural foramina, moderate central canal stenosis at T8, no cord lesion or cord edema seen, he also has a destructive lesion along the right lateral aspect of the L3 vertebral body, lumbar spine MRI has revealed metastatic lesion in the L3 vertebral body, extending into the right pedicle and superior sacral facets, lateral fracture of L3 with approximately 60% loss of height, metastatic infiltration along the endplates K5-K1, infiltration of tumor into the epidural space at L3 resulting in severe stenosis of the central canal lateral recess on the right neural foramina: Mild right paraspinal mass contiguous with the venous occlusions and extending from the level of L2-L5 S1, the mass exerts mass-effect on the sulcus which is laterally displaced. Small metastatic lesion in the left iliac bone. -Brain MRI had revealed a small extra-axial enhancing mass, measuring up 16mm, this may represent a meningioma, however given her history of malignancy short-term follow-up in 3 months was recommended. Bone scan has revealed widespread metastatic disease to the bones. - The patient was evaluated by neurosurgery, no surgical interventions were recommended. Rad Onc consulted for palliative RT to the spine, d/w Dr. Nasra Carrillo. - Pain Control, is on OxyIR and methadone, will add Dex, to be tapered once radiation is started.  -Palliative medicine team on board.  -The patient is status post liver biopsy, and bronchoscopy/EBUS, pathology from the left endobronchial mass and liver are consistent with non-small cell lung cancer, squamous cell carcinoma with basaloid features. In conjunction with the clinical history of a lung mass, the tumor seen in the liver most likely represents metastasis from a lung primary. PD-L1 pending. Await PD-L1 results to formulate the final treatment plan, NGS (foundation 1 testing) was ordered. - Mild hypercalcemia, secondary to bony mets, calcium is normal today. If worsening hypercalcemia, will treat with bisphonates. Will continue to follow. Thank you for allowing us to participate in the care of Mr. Chiang.     Will Olivera MD   HEMATOLOGY/MEDICAL ONCOLOGY  [facility]  [address] [address]  Dept: [phone]  Loc: [phone]

## 2020-12-21 NOTE — PROGRESS NOTES
Rad Oncology RN provided education, utilizing slides and handouts, related to receiving palliative radiation therapy to the lower spine. All patient's questions were answered from a nursing perspective, with patient expressing understanding of the information provided today.

## 2020-12-21 NOTE — PLAN OF CARE
Problem: Falls - Risk of:  Goal: Will remain free from falls  Description: Will remain free from falls  Outcome: Met This Shift  Goal: Absence of physical injury  Description: Absence of physical injury  Outcome: Met This Shift     Problem: Safety:  Goal: Free from accidental physical injury  Description: Free from accidental physical injury  Outcome: Met This Shift  Goal: Free from intentional harm  Description: Free from intentional harm  Outcome: Met This Shift     Problem: Daily Care:  Goal: Daily care needs are met  Description: Daily care needs are met  Outcome: Met This Shift     Problem: Skin Integrity:  Goal: Skin integrity will stabilize  Description: Skin integrity will stabilize  Outcome: Met This Shift     Problem: Pain:  Goal: Pain level will decrease  Description: Pain level will decrease  Outcome: Ongoing  Goal: Control of acute pain  Description: Control of acute pain  Outcome: Ongoing  Goal: Control of chronic pain  Description: Control of chronic pain  Outcome: Ongoing

## 2020-12-21 NOTE — PROGRESS NOTES
Hospitalist Progress Note      Synopsis: Patient admitted on 12/12/2020    Subjective     Patient admitted on the 12th with severe back pain  For the investigations have resulted in a diagnosis of left lung apex lesion with adenopathy with metastasis to the liver and spine. Also evaluated by neurosurgery with no intervention recommended. Palliative treatment to the spine. Palliative team following as well as pain control being attempted. He is already had bronchoscopy with EBUS. 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 Awaiting PD-L1 results to finalize  Dec 20 2020  Other than constipation he appears to be comfortable. Awaiting his radiation plan  December 21, 2020  Vital remained stable and he remains in a good mood. Starts radiation treatment tomorrow  At his baseline for his alertness and is quite alert. Eating well. Exam:  /65   Pulse 107   Temp 98.7 °F (37.1 °C) (Temporal)   Resp 18   Ht 5' 9\" (1.753 m)   Wt 195 lb (88.5 kg)   SpO2 98%   BMI 28.80 kg/m²   General appearance: No apparent distress, appears stated age and cooperative. HEENT: Pupils equal, round, and reactive to light. Conjunctivae/corneas clear. Neck: Supple. No jugular venous distention. Trachea midline. Respiratory:  Normal respiratory effort. Clear to auscultation, bilaterally without Rales/Wheezes/Rhonchi. Cardiovascular: Regular rate and rhythm with normal S1/S2 without murmurs, rubs or gallops. Abdomen: Soft, non-tender, slightly distended distended with normal bowel sounds. Musculoskeletal: No clubbing, cyanosis or edema bilaterally. Brisk capillary refill. 2+ lower extremity pulses (dorsalis pedis).    Skin:  No rashes    Neurologic: awake, alert and following commands and seems his same irritable self    Medications:  Reviewed    Infusion Medications   Scheduled Medications    mineral oil  1 enema Rectal Once    methylnaltrexone  12 mg Subcutaneous Once    sennosides-docusate sodium  2 tablet Oral BID    pantoprazole  40 mg Oral QAM AC    amLODIPine  5 mg Oral Daily    hydrALAZINE  50 mg Oral 3 times per day    potassium bicarb-citric acid  20 mEq Oral BID    dexamethasone  4 mg Oral 3 times per day    potassium chloride  40 mEq Oral Once    polyethylene glycol  17 g Oral Daily    lidocaine PF  5 mL Nebulization Once    sodium chloride flush  10 mL Intravenous 2 times per day    methadone  5 mg Oral 3 times per day    gabapentin  400 mg Oral TID    carvedilol  25 mg Oral BID WC    folic acid  1 mg Oral Daily    furosemide  40 mg Oral Daily    mirtazapine  15 mg Oral Nightly    enoxaparin  40 mg Subcutaneous Daily     PRN Meds: oxyCODONE **OR** oxyCODONE, sodium chloride flush, LORazepam, LORazepam, gadobenate dimeglumine, bisacodyl, acetaminophen, sodium chloride flush    I/O    Intake/Output Summary (Last 24 hours) at 12/21/2020 1819  Last data filed at 12/21/2020 1538  Gross per 24 hour   Intake 720 ml   Output 1800 ml   Net -1080 ml       Labs:   Recent Labs     12/19/20  0410 12/20/20  0408 12/21/20  0523   WBC 18.8* 17.3* 14.8*   HGB 13.8 12.9 12.4*   HCT 41.1 39.0 36.7*    257 247       Recent Labs     12/19/20  0410 12/20/20  0408 12/21/20  0523    135 133   K 3.2* 3.4* 4.5   CL 94* 93* 91*   CO2 32* 31* 33*   BUN 31* 23* 26*   CREATININE 1.0 0.9 1.0   CALCIUM 10.4* 10.1 10.2       Recent Labs     12/19/20  0410 12/20/20  0408 12/21/20  0523   PROT 6.6 6.1* 6.5   ALKPHOS 67 66 67   ALT 25 33 57*   AST 19 28 37   BILITOT 0.6 0.9 0.6       No results for input(s): INR in the last 72 hours. No results for input(s): Pallavi Gaster in the last 72 hours. Chronic labs:  Lab Results   Component Value Date    INR 1.1 12/14/2020       Radiology:  Xr Femur Left (min 2 Views)    Result Date: 12/13/2020  1. Lytic lesion in the left femoral neck.   No additional evidence of osseous metastasis on this exam.  No evidence of pathologic fracture. 2.  Mild left hip joint osteoarthritis. Ct Chest W Contrast    Result Date: 12/12/2020  Large heterogeneous left hilar mass as described consistent with malignancy. Occlusion of left upper lobe bronchus and severe bronchial narrowing to the left lower lobe. Mildly spiculated nodular lesion, pleural based in the left lung apex likely satellite malignant lesion measuring 1.5 cm. Mediastinal adenopathy likely manifesting malignant involvement. Large metastatic lesion to the T8 vertebral body with paraspinal an intraspinal extension of malignancy and pathologic compression fracture of T8. Ct Lumbar Spine Wo Contrast    Result Date: 12/12/2020  1. Lytic destructive lesion along the right lateral aspect of the L3 vertebral body, involving the right pedicle, transverse process and superior articular facet. 2. Pathological fracture on the right with loss of height by approximately 60%. The lesion may represent malignancy or an infectious process. Destructive changes in the discs characteristic of discitis/osteomyelitis are not evident on this study. Further evaluation with pre and post contrast enhanced MRI is recommended. 3. Soft tissue fullness extends from the L3 vertebral body into the adjacent psoas muscle, which is landis than the contralateral side. The soft tissue component may represent malignancy or infection. 4. Extent of epidural involvement of disease at L3 is not well delineated on this study. Pre and post contrast enhanced MRI should clarify. 5. Variable degrees of degenerative changes, as described. Ct Guided Needle Placement    Result Date: 12/16/2020  Successful uncomplicated CT and fluoroscopic guided liver mass biopsy. If there are any physician concerns regarding this report, a Radiologist can be reached by calling the following number [phone]. Mri Thoracic Spine W Wo Contrast    Result Date: 12/13/2020  1. adjacent intervertebral discs, and spinal canal.  Extension into the right pedicle and right articular facet and large component extending to the right iliopsoas muscle which is asymmetrically enlarged. Hypodense process extending caudally along the course of the right psoas muscle with mild peripheral enhancement, likely malignant involvement versus associated abscess, felt to be a less likely consideration. Intraspinal extension of malignant process at the level of T8, and L3. Ct Needle Biopsy Liver Percutaneous    Result Date: 12/16/2020  Successful uncomplicated CT and fluoroscopic guided liver mass biopsy. If there are any physician concerns regarding this report, a Radiologist can be reached by calling the following number [phone]. Xr Eye Foreign Body    Result Date: 12/13/2020  No evidence of metallic foreign body within the orbits. Nm Bone Scan Whole Body    Result Date: 12/14/2020  Findings compatible with degenerative changes Multiple foci of abnormal tracer uptake, consistent widespread metastatic disease. Mri Brain W Wo Contrast    Result Date: 12/14/2020  1. There is a small extra-axial enhancing mass, which appears to span the right and left aspects of the mid falx. This measures up to 16 mm. No significant adjacent parenchymal edema is seen. This may represent a meningioma. However, given history of malignancy, suggest a short-term follow-up examination in 3 months to evaluate for stability. 2. Otherwise, no acute intracranial abnormality. No acute infarct. 3. Mild global parenchymal volume loss with mild-to-moderate chronic microvascular ischemic changes. ASSESSMENT:    Principal Problem:    Lung mass  Active Problems:    Pathologic lumbar vertebral fracture, initial encounter    Malignant neoplasm metastatic to bone [facility])    Metastatic cancer to liver [facility])    Lung cancer metastatic to bone [facility])  Resolved Problems:    * No resolved hospital problems.  * PLAN:    Palliative services is managing his pain  Await further testing reports to decide about treatment plan. He is aware of his diagnosis however tells me he will go down fighting  Leukocytosis noted. Potassium again slightly low and will need replenished  December 20, 2020  Appears otherwise okay. Labs showing decreasing leukocytosis. Slightly low on potassium yet again. Appears comfortable. Await all the results and tests and final plan for treatment  Lower Norvasc and hydralazine    December 21, 2020  Blood pressure still on the lower side. DC amlodipine. His Lasix will be decreased from 40 mg to 20 mg. Hydralazine from 50-25. Continue with palliative care. Continue to follow labs. Appreciate multidisciplinary approach  Diet: DIET GENERAL;  Code Status: Prior    DVT Prophylaxis:   In place  Recommended disposition at discharge:  unsure yet    +++++++++++++++++++++++++++++++++++++++++++++++++  Moira Bosworth, MD   [facility].  +++++++++++++++++++++++++++++++++++++++++++++++++  NOTE: This report was transcribed using voice recognition software. Every effort was made to ensure accuracy; however, inadvertent computerized transcription errors may be present.

## 2020-12-21 NOTE — PROGRESS NOTES
Inpatient Medical Oncology Progress Note    Subjective:  No fever. CT sim today 12/21/2020. Objective:  /88   Pulse 95   Temp 98.7 °F (37.1 °C) (Temporal)   Resp 18   Ht 5' 9\" (1.753 m)   Wt 195 lb (88.5 kg)   SpO2 98%   BMI 28.80 kg/m²   GENERAL: alert oriented x 3 not in distress  HEENT: PERRLA; EOMI. Oropharynx clear. NECK: Supple. No palpable lymphadenopathy. LUNGS: Fair air entry bilaterally  CARDIOVASCULAR: RRR. No murmurs. ABDOMEN: Soft. Non-tender, non-distended. Positive bowel sounds. EXTREMITIES: Without clubbing, cyanosis, or edema. NEUROLOGIC: No focal deficits.    ECOG PS 1-2    Diagnostics:  Lab Results   Component Value Date    WBC 14.8 (H) 12/21/2020    HGB 12.4 (L) 12/21/2020    HCT 36.7 (L) 12/21/2020    MCV 91.5 12/21/2020     12/21/2020     Lab Results   Component Value Date     12/21/2020    K 4.5 12/21/2020    CL 91 (L) 12/21/2020    CO2 33 (H) 12/21/2020    BUN 26 (H) 12/21/2020    CREATININE 1.0 12/21/2020    GLUCOSE 152 (H) 12/21/2020    CALCIUM 10.2 12/21/2020    PROT 6.5 12/21/2020    LABALBU 3.5 12/21/2020    BILITOT 0.6 12/21/2020    ALKPHOS 67 12/21/2020    AST 37 12/21/2020    ALT 57 (H) 12/21/2020    LABGLOM >60 12/21/2020    GFRAA >60 12/21/2020     Lab Results   Component Value Date    .7 (H) 12/14/2020      123 on 12/14/2020  Chromogranin A pending    Impression/Plan:  52-year-old male patient, who presented with worsening back pain, found to have metastatic disease, large heterogeneous left hilar mass measuring about 5.4 cm, with occlusion of the left upper lobe bronchus with severe bronchial narrowing to the left lower lobe, left lung apex lesion measuring 1.5 cm, mediastinal adenopathy, metastasis to the liver, possibly the pancreas and the spine  MRI Thoracic spine had revealed metastatic lesions in the T8 and T12 vertebral bodies, pathologic fracture of the T8 vertebral body with approximately 40% loss of height, epidural extension of tumor along the left lateral aspect of the T8, extending into the left T8-9 neural foramina, moderate central canal stenosis at T8, no cord lesion or cord edema seen, he also has a destructive lesion along the right lateral aspect of the L3 vertebral body   MRI Lumbar spine revealed metastatic lesion in the L3 vertebral body, extending into the right pedicle and superior sacral facets, lateral fracture of L3 with approximately 60% loss of height, metastatic infiltration along the endplates I3-X8, infiltration of tumor into the epidural space at L3 resulting in severe stenosis of the central canal lateral recess on the right neural foramina: Mild right paraspinal mass contiguous with the venous occlusions and extending from the level of L2-L5 S1, the mass exerts mass-effect on the sulcus which is laterally displaced. Small metastatic lesion in the left iliac bone. The patient has a probable lung cancer, he has a widely metastatic disease. MRI Brain revealed a small extra-axial enhancing mass, measuring up 16mm, this may represent a meningioma, however given her history of malignancy short-term follow-up in 3 months was recommended. Bone scan revealed widespread metastatic disease to the bones. Evaluated by neurosurgery, no surgical interventions recommended. Rad Onc for palliative RT to the spine. CT sim today 12/21/2020. Treat in AM, 10 fractions   Pain Control. Palliative medicine team on board. S/P liver biopsy  Bronchoscopy/EBUS 12/16/2020 by Dr. Jyoti Reis from pulmonary team.   Findings included Large endobronchial mass completely obstructing the distal left mainstem  Pathology from the left endobronchial mass and liver are consistent with non-small cell lung cancer, squamous cell carcinoma with basaloid features. In conjunction with the clinical history of a lung mass, the tumor seen in the liver most likely represents metastasis from a lung primary. PD-L1 pending.   Await PD-L1 results to formulate the final treatment plan, NGS (foundation 1 testing) was ordered. Will continue to follow.     12/21/2020  Tia Melvin MD

## 2020-12-21 NOTE — PROGRESS NOTES
Palliative Care Department  [phone]  Palliative Care Progress Note  Provider Yocasta Pride PA-C    Jluita Bateman  [id number]  Hospital Day: 10    Referring Provider: Richardson Anderson MD  Palliative Medicine was consulted for assistance with: Symptom management, goals of care, CODE STATUS and family support    Brief summary:  Julita Bateman is a 44 y.o. who presented to the hospital with chief complaints of back pain and was admitted 12/12/2020 with diagnosis(ses) of malignant neoplasm metastatic to bone, lung mass, pathologic fracture of vertebrae due to neoplastic disease. Patient has started workup for malignancy. ASSESSMENT/PLAN:   Pertinent hospital diagnoses:  1. Metastatic non-small cell lung cancer, squamous type   -Liver and lung biopsy positive for malignancy, pathology and cytology reviewed  -Radiation oncology consult     2.  Pain associated with neoplasm  -Increase oxycodone change to 15-20mg q4 hrs prn  - Continue methadone 5mg q8 hours  - protonix IV prophylaxis  -  EKG screening normal QTc, ok for methadone  - increased neurontin to 400mg TID     3.  GI  - senna-s 2 tabs bid  - miralax 17 gm daily (make PRN if loose BM)  -Mineral oil enema followed by Dulcolax suppository today  -Relistor subcutaneous injection this evening if no result    4. Spinal cord stenosis secondary to tumor infiltration  -Receiving steroids  -Significant concerns with cord compression and worsening neurologic status, to begin radiation ASAP per Dr. Katiana Villalpando. PALLIATIVE CARE ENCOUNTER  - Capacity: At this time, Julita Bateman, Does have capacity for medical decision-making.   Capacity is time limited and situation/question specific  - Outcome of goals of care meeting: adjustment of pain medication  - Code Status:   full  - Advanced directives: None  - Surrogate decision maker/Legal NOK:     Contacts:    Reece Rutherford (mother) [phone]   [facility] Biblauren Andrade [phone]  - Spiritual assessment: none  -

## 2020-12-21 NOTE — PLAN OF CARE
Problem: Pain:  Goal: Pain level will decrease  Description: Pain level will decrease  12/21/2020 1140 by Coby Gordon RN  Outcome: Met This Shift  12/21/2020 0317 by Jose De Jesus Little RN  Outcome: Ongoing  Goal: Control of acute pain  Description: Control of acute pain  12/21/2020 1140 by Coby Gordon RN  Outcome: Met This Shift  12/21/2020 0317 by Jose De Jesus Little RN  Outcome: Ongoing  Goal: Control of chronic pain  Description: Control of chronic pain  12/21/2020 1140 by Coby Gordon RN  Outcome: Met This Shift  12/21/2020 0317 by Jose De Jesus Little RN  Outcome: Ongoing     Problem: Falls - Risk of:  Goal: Will remain free from falls  Description: Will remain free from falls  12/21/2020 0317 by Jose De Jesus Little RN  Outcome: Met This Shift  Goal: Absence of physical injury  Description: Absence of physical injury  12/21/2020 0317 by Jose De Jesus Little RN  Outcome: Met This Shift     Problem: Safety:  Goal: Free from accidental physical injury  Description: Free from accidental physical injury  12/21/2020 0317 by Jose De Jesus Little RN  Outcome: Met This Shift  Goal: Free from intentional harm  Description: Free from intentional harm  12/21/2020 0317 by Jose De Jesus Little RN  Outcome: Met This Shift     Problem: Daily Care:  Goal: Daily care needs are met  Description: Daily care needs are met  12/21/2020 0317 by Jose De Jesus Little RN  Outcome: Met This Shift     Problem: Skin Integrity:  Goal: Skin integrity will stabilize  Description: Skin integrity will stabilize  12/21/2020 0317 by Jose De Jesus Little RN  Outcome: Met This Shift

## 2020-12-21 NOTE — TELEPHONE ENCOUNTER
[facility] ordered on patient per Dr Fletcher Estrada. Order requisition completed, signed and electronically sent with required attached documents. Confirmation email received of order submitted. [facility] to request pathology specimen.

## 2020-12-21 NOTE — ONCOLOGY
Pt came down today for marking CT simulation of T-spine, L-spine, and Femurs per Dr. Chary Light in Radiation Oncology.

## 2020-12-21 NOTE — PROGRESS NOTES
Gus Tapia M.D.,[facility]  Fabio Storey D.O., F.SOLANGE., Anushka Chapman M.D. Aime Chau M.D., Evelyn Padilla M.D. Brittni Collier D.O. Daily Pulmonary Progress Note    Patient:  Burgess Camacho 44 y.o. male MRN: [id number]     Date of Service: 12/21/2020      Synopsis     We are following patient for lung mass with mets to liver and bone    \"CC\" confused     Code status:prior      Subjective      Patient was seen and examined. Patient was in oncology radiation today for his simulation. We will start XRT. He denies shortness of breath or cough. He is much more alert. And has no confusion now. Review of Systems:  Constitutional: Denies fever, weight loss, night sweats, and fatigue  Skin: Denies pigmentation, dark lesions, and rashes   HEENT: Denies hearing loss, tinnitus, ear drainage, epistaxis, sore throat, and hoarseness. Cardiovascular: Denies palpitations, chest pain, and chest pressure. Respiratory: Denies cough, dyspnea at rest and with any to believe, hemoptysis, apnea, and choking.   Gastrointestinal: Denies nausea, vomiting, poor appetite, diarrhea, heartburn or reflux  Genitourinary: Denies dysuria, frequency, urgency or hematuria  Musculoskeletal: Denies myalgias, muscle weakness, and +bone pain, +back pain     24-hour events:  Pain control    Objective   Vitals: /88   Pulse 95   Temp 98.7 °F (37.1 °C) (Temporal)   Resp 18   Ht 5' 9\" (1.753 m)   Wt 195 lb (88.5 kg)   SpO2 98%   BMI 28.80 kg/m²     I/O:    Intake/Output Summary (Last 24 hours) at 12/21/2020 1602  Last data filed at 12/21/2020 1538  Gross per 24 hour   Intake 720 ml   Output 1800 ml   Net -1080 ml       Vent Information  FiO2 : 2 %  SpO2: 98 %  SpO2/FiO2 ratio: 4750                CURRENT MEDS :  Scheduled Meds:   mineral oil  1 enema Rectal Once    methylnaltrexone  12 mg Subcutaneous Once    sennosides-docusate sodium  2 tablet Oral BID    pantoprazole  40 mg Oral QAM AC    amLODIPine  5 mg Oral Daily    hydrALAZINE  50 mg Oral 3 times per day    potassium bicarb-citric acid  20 mEq Oral BID    dexamethasone  4 mg Oral 3 times per day    potassium chloride  40 mEq Oral Once    polyethylene glycol  17 g Oral Daily    lidocaine PF  5 mL Nebulization Once    sodium chloride flush  10 mL Intravenous 2 times per day    methadone  5 mg Oral 3 times per day    gabapentin  400 mg Oral TID    carvedilol  25 mg Oral BID WC    folic acid  1 mg Oral Daily    furosemide  40 mg Oral Daily    mirtazapine  15 mg Oral Nightly    enoxaparin  40 mg Subcutaneous Daily       Physical Exam:  General Appearance: appears comfortable in no acute distress. HEENT: Normocephalic atraumatic without obvious abnormality   Neck: Lips, mucosa, and tongue normal.  Supple, symmetrical, trachea midline, no adenopathy;thyroid:  no enlargement/tenderness/nodules or JVD. Lung: Breath sounds CTA. Respirations   unlabored. Symmetrical expansion. Heart: RRR, normal S1, S2. No MRG  Abdomen: Soft, NT, ND. BS present x 4 quadrants. No bruit or organomegaly. Extremities: Pedal pulses 2+ symmetric b/l. Extremities normal, no cyanosis, clubbing, or edema. Musculokeletal: No joint swelling, no muscle tenderness. ROM normal in all joints of extremities. Neurologic: Mental status: Alert and Oriented X3 .     Pertinent/ New Labs and Imaging Studies     Imaging Personally Reviewed:none  Labs:  Lab Results   Component Value Date    WBC 14.8 12/21/2020    HGB 12.4 12/21/2020    HCT 36.7 12/21/2020    MCV 91.5 12/21/2020    MCH 30.9 12/21/2020    MCHC 33.8 12/21/2020    RDW 12.7 12/21/2020     12/21/2020    MPV 9.6 12/21/2020     Lab Results   Component Value Date     12/21/2020    K 4.5 12/21/2020    CL 91 12/21/2020    CO2 33 12/21/2020    BUN 26 12/21/2020    CREATININE 1.0 12/21/2020    LABALBU 3.5 12/21/2020    CALCIUM 10.2 12/21/2020    GFRAA >60 12/21/2020    LABGLOM >60 12/21/2020     Lab Results

## 2020-12-21 NOTE — PATIENT INSTRUCTIONS
MONA Garcia. Jennie Sherman MD, MS [address] [facility]:  [phone]   FAX: [phone]  [facility]:  [phone]   FAX:    [phone]  [address] [place] [address] Road:  [phone]   FAX:  [phone]  Email: [email or web address]

## 2020-12-21 NOTE — PROGRESS NOTES
Radiation Oncology      -imaging reviewed  -course reviewed  -dictation pending      -sim today, treat in AM, 10 fraction course IP or OP, D/C per primary      Rolf Arnold MD [place] [id number] Oncology  Cell: [phone]    [facility]:  [phone]   FAX: [phone] [address]:  [address] [place] [address] Avenue:    [phone] [address]:  [address] [place] [address] Road:  [phone]

## 2020-12-21 NOTE — CONSULTS
Radiation Oncology      Tg Chacko. Melissa Eason  [facility]      Referring Physician: Dr. Emerita Hinojosa      Primary Care Physician:No primary care provider on file. Primary Oncologist: Dr. Orman Meigs. Alex      Diagnosis: AJCC SG IV cancer, bone mets      Service:  Radiation Oncology consultation performed on 12/20/20        HPI:        Ej Manjarrez is a 44year old -pt with widely metastatic lung cancer and bone pain. The patient presents today to discuss fractionated external beam radiation therapy as a component of multidisciplinary, palliative management. We reviewed the available medical records including the complete medical history of this pt today prior to consultation. Epic -CE and available scanned documents per the Epic Media tab were reviewed PRN. A complete ROS was also performed today and is noted below. During consultation today I personally discussed the pts workup to date; including but not limited to applicable imaging studies, Pathology reports, and interventions. The NCCN guidelines, as pertaining to the above diagnosis were also recapped for the pt today in brief. Today, Desirae Ko  notes Sx that include fatigue, pain, anxiety. KPS 70 prior to admit.        -----  SARS-CoV-2:    -pt asymptomatic and afebrile  -negative rapid      [facility] NOV 2020: [email or web address] html:    The likelihood of recovering replication-competent virus also declines after onset of symptoms. For patients with mild to moderate ZRBBQ-25, replication-competent virus has not been recovered after 10 days following symptom onset (CDC, unpublished data, 2020; Adele Mt et al., 2020; Yaquelin et al., 2020; Jeronimo et al., 2020;  Asuncion et al., 0393; personal communication with Sonja Miner., 9996; [address] [facility],Third [facility], 2020).       -----  Per [address]:     Impression/Plan:  57-year-old male patient, who presented with worsening back pain, found to have metastatic disease, large heterogeneous left hilar mass measuring about 5.4 cm, with occlusion of the left upper lobe bronchus with severe bronchial narrowing to the left lower lobe, left lung apex lesion measuring 1.5 cm, mediastinal adenopathy, metastasis to the liver, possibly the pancreas and the spine  MRI Thoracic spine had revealed metastatic lesions in the T8 and T12 vertebral bodies, pathologic fracture of the T8 vertebral body with approximately 40% loss of height, epidural extension of tumor along the left lateral aspect of the T8, extending into the left T8-9 neural foramina, moderate central canal stenosis at T8, no cord lesion or cord edema seen, he also has a destructive lesion along the right lateral aspect of the L3 vertebral body   MRI Lumbar spine revealed metastatic lesion in the L3 vertebral body, extending into the right pedicle and superior sacral facets, lateral fracture of L3 with approximately 60% loss of height, metastatic infiltration along the endplates M9-W9, infiltration of tumor into the epidural space at L3 resulting in severe stenosis of the central canal lateral recess on the right neural foramina: Mild right paraspinal mass contiguous with the venous occlusions and extending from the level of L2-L5 S1, the mass exerts mass-effect on the sulcus which is laterally displaced.  Small metastatic lesion in the left iliac bone.    The patient has a probable lung cancer, he has a widely metastatic disease. MRI Brain revealed a small extra-axial enhancing mass, measuring up 16 mm, this may represent a meningioma, however given her history of malignancy short-term follow-up in 3 months was recommended. Bone scan revealed widespread metastatic disease to the bones. Evaluated by neurosurgery, no surgical interventions recommended. Rad Onc for palliative RT to the spine. Currently receiving RT  Pain Control. Palliative medicine team on board.   S/P liver biopsy 12/14/2020  Bronchoscopy/EBUS 12/16/2020 by Dr. Brittany Briones from pulmonary team.   Findings included Large endobronchial mass completely obstructing the distal left mainstem  Pathology from the left endobronchial mass and liver are consistent with non-small cell lung cancer, squamous cell carcinoma with basaloid features. In conjunction with the clinical history of a lung mass, the tumor seen in the liver most likely represents metastasis from a lung primary.  PD-L1 pending.  Await PD-L1 results to formulate the final treatment plan, NGS (foundation 1 testing) ordered. Continue RT at this time. Palliative care following. Awaiting placement    -----  Pathology reviewed:    Diagnosis:   Lung, left endobronchial mass, biopsy:   Squamous cell carcinoma with basaloid features, see comment. Comment:   Immunostains stain the carcinoma cells in block A1 as follows:   P40: Positive   TTF-1: Negative     As per institution protocol, PD-L1 testing has been ordered on block A1,   and results will be issued separately. Intradepartmental consultation is obtained.      Correlate with cytology specimen B-.     -----      Past Medical History:   Diagnosis Date    Acute kidney injury ([address] 7/14/2017    Cocaine abuse ([facility] 7/14/2017    Hepatitis C 1990    Heroin abuse ([facility] 7/14/2017    Hyperkalemia 7/14/2017    Hypertension     Liver disease     Non-traumatic rhabdomyolysis 7/14/2017    Psychiatric problem        Past Surgical History:   Procedure Laterality Date    BRONCHOSCOPY N/A 12/16/2020    BRONCHOSCOPY DIAGNOSTIC OR CELL [address] ONLY performed by Ashlee Mitchell MD at [address] [place] [address]  12/16/2020    BRONCHOSCOPY BRUSHINGS performed by Ashlee Mitchell MD at [address] [place] [address]  12/16/2020    BRONCHOSCOPY/TRANSBRONCHIAL NEEDLE BIOPSY performed by Ashlee Mitchell MD at [address] [place] [address]  12/16/2020    BRONCHOSCOPY/TRANSBRONCHIAL LUNG BIOPSY performed by Sara Maier MD at [address] [facility] [address] NEEDLE BIOPSY LIVER PERCUTANEOUS  12/14/2020    CT NEEDLE BIOPSY LIVER PERCUTANEOUS 12/14/2020 Jana Mota MD [facility] CT    HERNIA REPAIR         Family History   Problem Relation Age of Onset    Other Mother     Depression Mother     High Blood Pressure Mother     Coronary Art Dis Father     High Blood Pressure Father     Mental Illness Sister     No Known Problems Brother     No Known Problems Brother     No Known Problems Sister        Current Facility-Administered Medications   Medication Dose Route Frequency Provider Last Rate Last Admin    pantoprazole (PROTONIX) tablet 40 mg  40 mg Oral QAM AC Farideh Reis MD   40 mg at 12/21/20 0659    amLODIPine (NORVASC) tablet 5 mg  5 mg Oral Daily Farideh Reis MD        hydrALAZINE (APRESOLINE) tablet 50 mg  50 mg Oral 3 times per day Farideh Reis MD   50 mg at 12/20/20 2152    potassium bicarb-citric acid (EFFER-K) effervescent tablet 20 mEq  20 mEq Oral BID Farideh Reis MD   20 mEq at 12/20/20 2151    docusate sodium (COLACE) capsule 100 mg  100 mg Oral Daily Farideh Reis MD   100 mg at 12/20/20 1526    dexamethasone (DECADRON) tablet 4 mg  4 mg Oral 3 times per day Nell Montero MD   4 mg at 12/21/20 3178    potassium chloride (KLOR-CON M) extended release tablet 40 mEq  40 mEq Oral Once Farideh Reis MD        polyethylene glycol (GLYCOLAX) packet 17 g  17 g Oral Daily Yoseph Lira MD   17 g at 12/20/20 0905    lidocaine PF 1 % injection 5 mL  5 mL Nebulization Once Sara Maier MD        sodium chloride flush 0.9 % injection 10 mL  10 mL Intravenous 2 times per day Yoseph Lira MD   10 mL at 12/18/20 1047    sodium chloride flush 0.9 % injection 10 mL  10 mL Intravenous PRN Yoseph Lira MD        oxyCODONE HCl (OXY-IR) immediate release tablet 10 mg  10 mg Oral Q4H PRN Deb Domínguez DO        Or    oxyCODONE (OXY-IR) immediate release tablet 15 mg  15 mg Oral Q4H PRN Amy Lopez, DO   15 mg at 12/20/20 1706    LORazepam (ATIVAN) tablet 1 mg  1 mg Oral Q4H PRN [place] Hess MD   1 mg at 12/18/20 1046    LORazepam (ATIVAN) injection 1 mg  1 mg Intravenous Q6H PRN [place] Hess MD   1 mg at 12/18/20 0052    methadone (DOLOPHINE) tablet 5 mg  5 mg Oral 3 times per day Amy Lopez, DO   5 mg at 12/21/20 8736    gabapentin (NEURONTIN) capsule 400 mg  400 mg Oral TID Amy Lopez, DO   400 mg at 12/20/20 2153    gadobenate dimeglumine (MULTIHANCE) injection 20 mL  20 mL Intravenous ONCE PRN Davidson Mendes DO        bisacodyl (DULCOLAX) suppository 10 mg  10 mg Rectal PRN FABI Mejía        carvedilol (COREG) tablet 25 mg  25 mg Oral BID WC Melyssa Star, DO   25 mg at 04/43/55 4334    folic acid (FOLVITE) tablet 1 mg  1 mg Oral Daily Melyssa Star, DO   1 mg at 12/20/20 8937    furosemide (LASIX) tablet 40 mg  40 mg Oral Daily Melyssa Star, DO   40 mg at 12/20/20 7303    mirtazapine (REMERON) tablet 15 mg  15 mg Oral Nightly Melyssa Star, DO   15 mg at 12/20/20 2150    acetaminophen (TYLENOL) tablet 650 mg  650 mg Oral Q4H PRN Melyssa Graves, DO   650 mg at 12/13/20 0932    sodium chloride flush 0.9 % injection 10 mL  10 mL Intravenous PRN Laureen Nguyen MD   10 mL at 12/13/20 1230    enoxaparin (LOVENOX) injection 40 mg  40 mg Subcutaneous Daily Zia Anderson MD   40 mg at 12/20/20 0903       No Known Allergies      Social History     Socioeconomic History    Marital status:      Spouse name: None    Number of children: None    Years of education: None    Highest education level: None   Occupational History    None   Social Needs    Financial resource strain: None    Food insecurity     Worry: None     Inability: None    Transportation needs     Medical: None     Non-medical: None   Tobacco Use    Smoking status: Current Every Day Smoker     Packs/day: 1.50     Types: Cigarettes    Smokeless tobacco: Never Used Substance and Sexual Activity    Alcohol use: Yes     Alcohol/week: 72.0 standard drinks     Types: 70 Cans of beer, 2 Shots of liquor per week    Drug use: Yes     Types: IV, Cocaine     Comment: fentanyl, heroin    Sexual activity: Yes     Partners: Female   Lifestyle    Physical activity     Days per week: None     Minutes per session: None    Stress: None   Relationships    Social connections     Talks on phone: None     Gets together: None     Attends Orthodoxy service: None     Active member of club or organization: None     Attends meetings of clubs or organizations: None     Relationship status: None    Intimate partner violence     Fear of current or ex partner: None     Emotionally abused: None     Physically abused: None     Forced sexual activity: None   Other Topics Concern    None   Social History Narrative    None         Review of Systems - History obtained from chart review and the patient  General ROS: positive for  - fatigue  Psychological ROS: positive for - anxiety  Ophthalmic ROS: negative  ENT ROS: negative  Allergy and Immunology ROS: negative  Hematological and Lymphatic ROS: negative  Endocrine ROS: negative  Breast ROS: negative for breast lumps  Respiratory ROS: positive for - cough  Cardiovascular ROS: no chest pain or dyspnea on exertion  Gastrointestinal ROS: positive for - stool incontinence  Genito-Urinary ROS: no dysuria, trouble voiding, or hematuria  Musculoskeletal ROS: positive for - gait disturbance, joint pain and muscular weakness  Neurological ROS: positive for - behavioral changes, gait disturbance, impaired coordination/balance, numbness/tingling and weakness  Dermatological ROS: negative      Physical Exam  HENT:      Head: Normocephalic. Right Ear: External ear normal.      Left Ear: External ear normal.      Nose: Nose normal.      Mouth/Throat:      Mouth: Mucous membranes are moist.   Eyes:      Pupils: Pupils are equal, round, and reactive to light. Cardiovascular:      Rate and Rhythm: Normal rate. Pulses: Normal pulses. Pulmonary:      Effort: Pulmonary effort is normal.   Abdominal:      General: Abdomen is flat. Musculoskeletal: Normal range of motion. General: Swelling present. Skin:     General: Skin is warm. Neurological:      Mental Status: He is alert and oriented to person, place, and time. Motor: Weakness present. Coordination: Coordination abnormal.   Psychiatric:         Mood and Affect: Mood normal.         Behavior: Behavior normal.         Thought Content: Thought content normal.         Judgment: Judgment normal.             Imaging reviewed:    MRI spine 12/13/20:  Impression   1. Metastatic lesion in the L3 vertebral body, extending into the right   pedicle and superior articular facet.  Pathological fracture of L3, with   approximately 60% loss of height. 2. Metastatic infiltration along the endplates L3 and L4.  No pathological   fracture seen at these levels. 3. Infiltration of tumor into the epidural space at L3 resulting in [place], LATERAL RECESSES AND THE RIGHT NEURAL FORAMINA.    4. Large right paraspinal mass is contiguous with the bony metastatic lesions   and extends from the level of L2 to L5-S1.  The mass exerts mass effect on   the psoas, which is laterally displaced.  Areas of cystic degeneration are   seen right paraspinal mass at the level of L4 and L5.   5. Partially included in the field of view of this study is a small   metastatic lesion in the left iliac bone           Radiation Safety and Treatment Support:  -previous Radiation history: No  -history of connective tissue disease: No  -history of autoimmune disease: No  -pregnant: not applicable  -fertility conservation and /or contraception discussed: no  -nutrition consult prior to [address]: Yes  -PEG: No  -Dental evaluation prior to treatment:No  -Social Work requested: Yes  -Oncology Nurse Navigator requested: Yes  -pre + post treatment PT / Rehab / PM+R evaluation considered: Yes  -ICD: No   -ICD brand: -  -[facility] patient navigator: Vera Wilson  -Nurse Practitioners for Radiation Oncology:    ---Evangelist Andersen, MSN, RN, FNP-C   ---Alyssa Unger, MSN, RN, FNP-BC        Assessment and Plan: Bryson Ormond is a 44year old with a recent diagnosis of AJCC stage group IV lung cancer with bone meta and pain. We recommend 3 ISO palliative fractionated external beam radiation therapy (outcome per extrapolated 32634) to the mid spine, lumbar region and left hip. The risks, benefits, alternatives, process and logistics of external beam radiation were reviewed today. We answered all of the patient's questions to the best of our ability. Frank verbalized understanding and seemed satisfied. Radiation planning will commence within 1 hour 14 days; the next step in management being the simulation scan, with external beam radiation to commence in a timely fashion thereafter. It was a pleasure meeting Frank today and we appreciate the referral and opportunity to be involved in his care. We had an extensive discussion today regarding the course to date (including a focused review of theapplicable radiographic and laboratory information), multidisciplinary approach to cancer care, and indications for external beam radiation therapy as a component therein. A literature review and multidisciplinary discussion was performed after seeing this patient due to the complexity of the medical decision making in this case. I personally spent greater than 95 minutes on this case and with this patient. I performed the complete history and physical as above at today's visit, at least 45 minutes was in direct discussion and  regarding disease management. -SIM 12/21/20  -Tx urgent  -Tx IP or OP, 10 fraction course D/C per primary  -discussed compliance.   - I spoke with his ex wife and son on the phone, they have my contact info        [place]. MD Pacheco Flores Gerardo  Oncology  Cell: [phone]    [facility]:  [facility] [address]: [phone]  [address] [place] [address]:  [phone]   FAX:    [phone]  [address] [place] [address] Road:  [phone]   FAX:  [phone]        NOTE: This report was transcribed using voice recognition software. Every effort was made to ensure accuracy; however, inadvertent computerized transcription errors may be present.

## 2020-12-22 NOTE — CARE COORDINATION
MELANI/SAMATNHA met with the pt at the bedside. He is alert and oriented. Discussed discharge plan. He is planning to stay with his friend, Oracio. However, he has not been OOB. Will work with PT/OT.  Mandy Osei RN

## 2020-12-22 NOTE — ONCOLOGY
Patient received 300cGray to the Thoracic-spine,  300 cGray to Lumbosacral Spine and 300 cGray to the hips.

## 2020-12-22 NOTE — PROGRESS NOTES
Hospitalist Progress Note      Synopsis: Patient admitted on 12/12/2020    Subjective     Patient admitted on the 12th with severe back pain  For the investigations have resulted in a diagnosis of left lung apex lesion with adenopathy with metastasis to the liver and spine. Also evaluated by neurosurgery with no intervention recommended. Palliative treatment to the spine. Palliative team following as well as pain control being attempted. He is already had bronchoscopy with EBUS. 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 Awaiting PD-L1 results to finalize  Dec 20 2020  Other than constipation he appears to be comfortable. Awaiting his radiation plan  December 21, 2020  Vital remained stable and he remains in a good mood. Starts radiation treatment tomorrow  At his baseline for his alertness and is quite alert. Eating well.    12/22:  Patient returned from radiation oncology  Denies any new complaints  He reports that he is bombarded with information and cannot make up his mind yet    Exam:  /70   Pulse 110   Temp 98 °F (36.7 °C) (Temporal)   Resp 16   Ht 5' 9\" (1.753 m)   Wt 195 lb (88.5 kg)   SpO2 94%   BMI 28.80 kg/m²   General appearance: No apparent distress, appears stated age and cooperative. HEENT: Pupils equal, round, and reactive to light. Conjunctivae/corneas clear. Neck: Supple. No jugular venous distention. Trachea midline. Respiratory:  Normal respiratory effort. Clear to auscultation, bilaterally without Rales/Wheezes/Rhonchi. Cardiovascular: Regular rate and rhythm with normal S1/S2 without murmurs, rubs or gallops. Abdomen: Soft, non-tender, slightly distended distended with normal bowel sounds. Musculoskeletal: No clubbing, cyanosis or edema bilaterally. Brisk capillary refill. 2+ lower extremity pulses (dorsalis pedis).    Skin:  No rashes    Neurologic: awake, alert and following commands and seems his same irritable self    Medications:  Reviewed    Infusion Medications   Scheduled Medications    mineral oil  1 enema Rectal Once    sennosides-docusate sodium  2 tablet Oral BID    furosemide  20 mg Oral Daily    hydrALAZINE  25 mg Oral 3 times per day    pantoprazole  40 mg Oral QAM AC    potassium bicarb-citric acid  20 mEq Oral BID    dexamethasone  4 mg Oral 3 times per day    potassium chloride  40 mEq Oral Once    polyethylene glycol  17 g Oral Daily    lidocaine PF  5 mL Nebulization Once    sodium chloride flush  10 mL Intravenous 2 times per day    methadone  5 mg Oral 3 times per day    gabapentin  400 mg Oral TID    carvedilol  25 mg Oral BID WC    folic acid  1 mg Oral Daily    mirtazapine  15 mg Oral Nightly    enoxaparin  40 mg Subcutaneous Daily     PRN Meds: oxyCODONE **OR** oxyCODONE, sodium chloride flush, LORazepam, LORazepam, gadobenate dimeglumine, bisacodyl, acetaminophen, sodium chloride flush    I/O    Intake/Output Summary (Last 24 hours) at 12/22/2020 1836  Last data filed at 12/22/2020 1524  Gross per 24 hour   Intake 1440 ml   Output 700 ml   Net 740 ml       Labs:   Recent Labs     12/20/20  0408 12/21/20  0523 12/22/20  0416   WBC 17.3* 14.8* 19.7*   HGB 12.9 12.4* 12.3*   HCT 39.0 36.7* 37.0    247 298       Recent Labs     12/20/20  0408 12/21/20  0523 12/22/20  0416    133 133   K 3.4* 4.5 4.4   CL 93* 91* 91*   CO2 31* 33* 32*   BUN 23* 26* 28*   CREATININE 0.9 1.0 0.9   CALCIUM 10.1 10.2 9.8       Recent Labs     12/20/20  0408 12/21/20  0523 12/22/20  0416   PROT 6.1* 6.5 6.4   ALKPHOS 66 67 65   ALT 33 57* 51*   AST 28 37 32   BILITOT 0.9 0.6 0.4       No results for input(s): INR in the last 72 hours. No results for input(s): Trevor Fling in the last 72 hours.     Chronic labs:  Lab Results   Component Value Date    INR 1.1 12/14/2020       Radiology:  Xr Femur Left (min 2 Views)    Result Date: [id number]. Mri Thoracic Spine W Wo Contrast    Result Date: 12/13/2020  1. Metastatic lesions in the T8 and T12 vertebral bodies, more prominent in the former. 2. Pathological fracture of the T8 vertebral body with approximately 40% loss of height. 3. Epidural extension of tumor along the left lateral aspect at T8, extending into the left T8-9 neural foramina. 4. Moderate central canal stenosis at T8. 5. No cord lesion or cord edema seen. Mri Lumbar Spine W Wo Contrast    Result Date: 12/13/2020  1. Metastatic lesion in the L3 vertebral body, extending into the right pedicle and superior articular facet. Pathological fracture of L3, with approximately 60% loss of height. 2. Metastatic infiltration along the endplates L3 and L4. No pathological fracture seen at these levels. 3. Infiltration of tumor into the epidural space at L3 resulting in [address], LATERAL RECESSES AND THE RIGHT NEURAL FORAMINA. 4. Large right paraspinal mass is contiguous with the bony metastatic lesions and extends from the level of L2 to L5-S1. The mass exerts mass effect on the psoas, which is laterally displaced. Areas of cystic degeneration are seen right paraspinal mass at the level of L4 and L5. 5. Partially included in the field of view of this study is a small metastatic lesion in the left iliac bone    Ct Abdomen Pelvis W Iv Contrast Additional Contrast? None    Result Date: 12/12/2020  Hypodense lesions in the right lobe of the liver, the larger measuring 3.5 cm in diameter and was consistent with metastatic disease. Mildly heterogeneous mass extending cephalad from the head of the pancreas, most suspicious for pancreatic malignancy measuring approximately 2.4 x 3 x 3.2 cm in diameter. Multiple lytic lesions as described involving the T8 vertebral body, T12 vertebral body, L3 vertebral body, and proximal left femur.  Pathologic compression fracture of L3 and extensive extension of malignant process to the paraspinal soft tissues anteriorly and to the right of L3, to the adjacent intervertebral discs, and spinal canal.  Extension into the right pedicle and right articular facet and large component extending to the right iliopsoas muscle which is asymmetrically enlarged. Hypodense process extending caudally along the course of the right psoas muscle with mild peripheral enhancement, likely malignant involvement versus associated abscess, felt to be a less likely consideration. Intraspinal extension of malignant process at the level of T8, and L3. Ct Needle Biopsy Liver Percutaneous    Result Date: 12/16/2020  Successful uncomplicated CT and fluoroscopic guided liver mass biopsy. If there are any physician concerns regarding this report, a Radiologist can be reached by calling the following number [phone]. Xr Eye Foreign Body    Result Date: 12/13/2020  No evidence of metallic foreign body within the orbits. Nm Bone Scan Whole Body    Result Date: 12/14/2020  Findings compatible with degenerative changes Multiple foci of abnormal tracer uptake, consistent widespread metastatic disease. Mri Brain W Wo Contrast    Result Date: 12/14/2020  1. There is a small extra-axial enhancing mass, which appears to span the right and left aspects of the mid falx. This measures up to 16 mm. No significant adjacent parenchymal edema is seen. This may represent a meningioma. However, given history of malignancy, suggest a short-term follow-up examination in 3 months to evaluate for stability. 2. Otherwise, no acute intracranial abnormality. No acute infarct. 3. Mild global parenchymal volume loss with mild-to-moderate chronic microvascular ischemic changes.     ASSESSMENT:    Principal Problem:    Lung mass  Active Problems:    Pathologic lumbar vertebral fracture, initial encounter    Malignant neoplasm metastatic to bone [facility])    Metastatic cancer to liver [facility])    Lung cancer metastatic to bone [facility])  Resolved Problems:    * No resolved hospital problems. *       PLAN:    Palliative services is managing his pain  Await further testing reports to decide about treatment plan. He is aware of his diagnosis however tells me he will go down fighting  Leukocytosis noted. Potassium again slightly low and will need replenished  December 20, 2020  Appears otherwise okay. Labs showing decreasing leukocytosis. Slightly low on potassium yet again. Appears comfortable. Await all the results and tests and final plan for treatment  Lower Norvasc and hydralazine    December 21, 2020  Blood pressure still on the lower side. DC amlodipine. His Lasix will be decreased from 40 mg to 20 mg. Hydralazine from 50-25. Continue with palliative care. Continue to follow labs. Appreciate multidisciplinary approach    12/22:  Discharge plan discussed with the patient  Subacute rehab is being you are considered  He is yet to make a decision  Diet: DIET GENERAL;  Code Status: Prior    DVT Prophylaxis:   In place  Recommended disposition at discharge:  unsure yet    +++++++++++++++++++++++++++++++++++++++++++++++++  Suzie Hood MD   [facility].  +++++++++++++++++++++++++++++++++++++++++++++++++  NOTE: This report was transcribed using voice recognition software. Every effort was made to ensure accuracy; however, inadvertent computerized transcription errors may be present.

## 2020-12-22 NOTE — PROGRESS NOTES
Radiation Oncology        -pt consulted   -dictation pending  -pt is under Tx - he started this AM and tolerated it well  -I spoke with his ex wife by phone 12/21/20  -I spoke with his son by phone 12/22/20  -D/C per primary, RT IP or OP        Luisa Guerra.  Eloina Diaz MD [place] [address] Oncology  Cell: [phone]    [facility]:  [phone]   FAX: [phone]  [facility]:  [address] [place] [address] Avenue:    [phone]  [facility]:  [phone]:  [phone]

## 2020-12-22 NOTE — PROGRESS NOTES
Palliative medicine    Chart reviewed    BM x 1 reported  S/p dulcolax suppository and relistor (patient refused mineral oil enema)     Oxycodone 15 mg IR - 12/21 x1 (1031)  Oxycodone 20 mg IR- 12/21 x 1 (7396)                                      12/22 x 3 (2165, 1522, 0915)    Pain level reported 10/10 down to 0/10 after oxycodone 0522 dose  /167 noted     Patient off floor to start radiation. Continue current treatment plan. Palliative to continue follow-up and management of symptoms.     Aamir Mercedes PA-C

## 2020-12-22 NOTE — PROGRESS NOTES
Palliative Care Department  [phone]  Palliative Care Progress Note  Provider [facility] PA-GHASSAN Rodriguez  [id number]  Hospital Day: 11    Referring Provider: Samantha Hendrix MD  Palliative Medicine was consulted for assistance with: Symptom management, goals of care, CODE STATUS and family support    Brief summary:  Jl Rodriguez is a 44 y.o. who presented to the hospital with chief complaints of back pain and was admitted 12/12/2020 with diagnosis(ses) of malignant neoplasm metastatic to bone, lung mass, pathologic fracture of vertebrae due to neoplastic disease. Patient has started workup for malignancy. ASSESSMENT/PLAN:   Pertinent hospital diagnoses:  1. Metastatic non-small cell lung cancer, squamous type   -Liver and lung biopsy positive for malignancy, pathology and cytology reviewed  -Radiation oncology consult     2.  Pain associated with neoplasm  -Increase oxycodone change to 15-20mg q4 hrs prn  - Continue methadone 5mg q8 hours  - protonix IV prophylaxis  -  EKG screening normal QTc, ok for methadone  - increased neurontin to 400mg TID  *Provided daily IV morphine 4 mg to receive 15 to 30 minutes before leaving for radiation     3. GI-constipation resolved  - senna-s 2 tabs bid  - miralax 17 gm daily PRN     4.  Spinal cord stenosis secondary to tumor infiltration  -Receiving steroids  -Significant concerns with cord compression and worsening neurologic status, to begin radiation ASAP per Dr. Gary Moran. PALLIATIVE CARE ENCOUNTER  - Capacity: At this time, Jl Rodriguez, Does have capacity for medical decision-making.   Capacity is time limited and situation/question specific  - Outcome of goals of care meeting: Consult to psych, provide advanced directives  - Code Status:   full  - Advanced directives: None  - Surrogate decision maker/Legal NOK:     Contacts:    [facility] (mother) [phone]   [facility] [phone]  - Spiritual assessment: none  - Bereavement and that appears on the right flank consistent with the outline of a dressing, pale appearing ,scar under left scapula  Neuro:  Alert, remains    Objective data reviewed: labs, images, records, medication use, vitals and chart  WBC increased today 19.8 with left shift    Time/Communication  Greater than 50% of time spent, total 75 minutes in counseling and coordination of care at the bedside/over the telephone regarding goals of care, symptom management, diagnosis and prognosis and see above. Thank you for allowing Palliative Medicine to participate in the care of Aisha Ortiz. Provider Vannesa Martinez PA-C  Palliative Medicine      Note: This report was completed using computerize voice recognition software. Every effort has been made to ensure accuracy; however, inadvertent computerized transcription errors may be present.

## 2020-12-23 NOTE — PROGRESS NOTES
Comprehensive Nutrition Assessment    Type and Reason for Visit:  Initial, RD Nutrition Re-Screen/LOS    Nutrition Recommendations/Plan: Continue current diet as ordered . WIll add ONS to promote optimal PO intake. Nutrition Assessment:  Pt admit w/ back pain found to have lung ca w/ mets to spine,pancreas, liver. Radiation started this admission. Will add ONS to promote optimal PO intake    Malnutrition Assessment:  Malnutrition Status:  No malnutrition      Estimated Daily Nutrient Needs:  Energy (kcal):  25-30= 7835-8208; Weight Used for Energy Requirements:  Current     Protein (g):  1.5-1.8= 120-130; Weight Used for Protein Requirements:  Ideal        Fluid (ml/day):  2200ml; Method Used for Fluid Requirements:  1 ml/kcal      Nutrition Related Findings:  -I/os, distended abd ,active bs, no edema noted, s/p liver bx, EBUS w/ bronch. Noted 'intentional\" wt loss PTA      Wounds:  None       Current Nutrition Therapies:    DIET GENERAL; Anthropometric Measures:  · Height: 5' 9\" (175.3 cm)  · Current Body Weight: 195 lb (88.5 kg)   · Usual Body Weight: (no actual wts perEMR)     · Ideal Body Weight: 160 lbs; % Ideal Body Weight 121.9 %   · BMI: 28.8  · BMI Categories: Overweight (BMI 25.0-29. 9)       Nutrition Diagnosis:   · Inadequate oral intake related to catabolic illness as evidenced by intake 51-75%      Nutrition Interventions:   Food and/or Nutrient Delivery:  Continue Current Diet, Start Oral Nutrition Supplement  Nutrition Education/Counseling:  No recommendation at this time   Coordination of Nutrition Care:  Continue to monitor while inpatient    Goals:  >75% of meals/ONS       Nutrition Monitoring and Evaluation:   Behavioral-Environmental Outcomes:  None Identified   Food/Nutrient Intake Outcomes:  Food and Nutrient Intake, Supplement Intake  Physical Signs/Symptoms Outcomes:  Biochemical Data, GI Status, Fluid Status or Edema, Nutrition Focused Physical Findings, Weight, Skin     Discharge Planning:     Too soon to determine     Electronically signed by Paulette Saavedra RD, MADONNA on 12/23/20 at 11:24 AM EST    Contact: x [phone]

## 2020-12-23 NOTE — CARE COORDINATION
Pt has not made a decision regarding discharge plan. He states there are too many people coming in to talk to him and he needs more time. Awaiting PT eval. Will follow.  Marcelo Morales RN

## 2020-12-23 NOTE — ONCOLOGY
Pt came down today for radiation tx to the T-spine, L-spine, sacrum, and lt hip/femur per Dr Jeffery Rendon in Radiation Oncology.

## 2020-12-23 NOTE — PROGRESS NOTES
DEPARTMENT OF RADIATION ONCOLOGY ON TREATMENT VISIT         12/23/2020      NAME:  Nikky Chiang    YOB: 1970    Diagnosis: bone mets    SUBJECTIVE:   Claudeen Bayard has now received fractionated external beam radiation therapy - ongoing. Past medical, surgical, social and family histories reviewed and updated as indicated. Pain: controlled    ALLERGIES:  Patient has no known allergies. No current facility-administered medications for this encounter.       Current Outpatient Medications   Medication Sig Dispense Refill    albuterol sulfate HFA (PROVENTIL HFA) 108 (90 Base) MCG/ACT inhaler Inhale 2 puffs into the lungs every 6 hours as needed for Wheezing 1 Inhaler 3     Facility-Administered Medications Ordered in Other Encounters   Medication Dose Route Frequency Provider Last Rate Last Admin    mineral oil enema 1 enema  1 enema Rectal Once Merlinda Pollen, PA        sennosides-docusate sodium (SENOKOT-S) 8.6-50 MG tablet 2 tablet  2 tablet Oral BID Merlinda Pollen, PA   2 tablet at 12/22/20 1629    oxyCODONE (OXY-IR) immediate release tablet 15 mg  15 mg Oral Q4H PRN Merlinda Pollen, PA        Or    oxyCODONE HCl (OXY-IR) immediate release tablet 20 mg  20 mg Oral Q4H PRN Merlinda Pollen, PA   20 mg at 12/23/20 0416    furosemide (LASIX) tablet 20 mg  20 mg Oral Daily Kristin Rubinstein, MD   20 mg at 12/23/20 0855    hydrALAZINE (APRESOLINE) tablet 25 mg  25 mg Oral 3 times per day Kristin Rubinstein, MD   25 mg at 12/23/20 0700    pantoprazole (PROTONIX) tablet 40 mg  40 mg Oral QAM AC Kristin Rubinstein, MD   40 mg at 12/23/20 0659    potassium bicarb-citric acid (EFFER-K) effervescent tablet 20 mEq  20 mEq Oral BID Kristin Rubinstein, MD   20 mEq at 12/23/20 0855    dexamethasone (DECADRON) tablet 4 mg  4 mg Oral 3 times per day Lolly Carr MD   4 mg at 12/23/20 0700    potassium chloride (KLOR-CON M) extended release tablet 40 mEq  40 mEq Oral Once Kristin Rubinstein, MD Everlene Paschal (102.4 kg)         ASSESSMENT/PLAN:     Patient is tolerating treatments well with expected toxicities. RBA were reviewed prior to first fraction and PRN. Current and planned dose reviewed. Goals of treatment and potential side effects were reviewed with the patient PRN. Treatment imaging has been personally reviewed for accuracy and precision. Questions answered to apparent satisfaction. Treatments will continue as planned.      -2nd encounter. Moe Jin.  Beckie Marroquin MD MS DABR  Radiation Oncologist        [facility] ([address] [place] [address]): [phone] /// FAX: [phone]  [facility]): [phone] /// FAX: [phone]  [address] [place] [address]): [phone] /// FAX: [phone]

## 2020-12-23 NOTE — PROGRESS NOTES
Hospitalist Progress Note      Synopsis: Patient admitted on 12/12/2020    Subjective     Patient admitted on the 12th with severe back pain  For the investigations have resulted in a diagnosis of left lung apex lesion with adenopathy with metastasis to the liver and spine. Also evaluated by neurosurgery with no intervention recommended. Palliative treatment to the spine. Palliative team following as well as pain control being attempted. He is already had bronchoscopy with EBUS. 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 Awaiting PD-L1 results to finalize  Dec 20 2020  Other than constipation he appears to be comfortable. Awaiting his radiation plan  December 21, 2020  Vital remained stable and he remains in a good mood. Starts radiation treatment tomorrow  At his baseline for his alertness and is quite alert. Eating well.    12/22:  Patient returned from radiation oncology  Denies any new complaints  He reports that he is bombarded with information and cannot make up his mind yet    12/23:  Same routine -too much info, can't handle it    Exam:  /85   Pulse 103   Temp 97 °F (36.1 °C) (Temporal)   Resp 16   Ht 5' 9\" (1.753 m)   Wt 195 lb (88.5 kg)   SpO2 94%   BMI 28.80 kg/m²   General appearance: No apparent distress, appears stated age and cooperative. HEENT: Pupils equal, round, and reactive to light. Conjunctivae/corneas clear. Neck: Supple. No jugular venous distention. Trachea midline. Respiratory:  Normal respiratory effort. Clear to auscultation, bilaterally without Rales/Wheezes/Rhonchi. Cardiovascular: Regular rate and rhythm with normal S1/S2 without murmurs, rubs or gallops. Abdomen: Soft, non-tender, slightly distended distended with normal bowel sounds. Musculoskeletal: No clubbing, cyanosis or edema bilaterally. Brisk capillary refill.  2+ lower extremity pulses 12/14/2020       Radiology:  Maldonado Villegas Femur Left (min 2 Views)    Result Date: 12/13/2020  1. Lytic lesion in the left femoral neck. No additional evidence of osseous metastasis on this exam.  No evidence of pathologic fracture. 2.  Mild left hip joint osteoarthritis. Ct Chest W Contrast    Result Date: 12/12/2020  Large heterogeneous left hilar mass as described consistent with malignancy. Occlusion of left upper lobe bronchus and severe bronchial narrowing to the left lower lobe. Mildly spiculated nodular lesion, pleural based in the left lung apex likely satellite malignant lesion measuring 1.5 cm. Mediastinal adenopathy likely manifesting malignant involvement. Large metastatic lesion to the T8 vertebral body with paraspinal an intraspinal extension of malignancy and pathologic compression fracture of T8. Ct Lumbar Spine Wo Contrast    Result Date: 12/12/2020  1. Lytic destructive lesion along the right lateral aspect of the L3 vertebral body, involving the right pedicle, transverse process and superior articular facet. 2. Pathological fracture on the right with loss of height by approximately 60%. The lesion may represent malignancy or an infectious process. Destructive changes in the discs characteristic of discitis/osteomyelitis are not evident on this study. Further evaluation with pre and post contrast enhanced MRI is recommended. 3. Soft tissue fullness extends from the L3 vertebral body into the adjacent psoas muscle, which is landis than the contralateral side. The soft tissue component may represent malignancy or infection. 4. Extent of epidural involvement of disease at L3 is not well delineated on this study. Pre and post contrast enhanced MRI should clarify. 5. Variable degrees of degenerative changes, as described. Ct Guided Needle Placement    Result Date: 12/16/2020  Successful uncomplicated CT and fluoroscopic guided liver mass biopsy.  If there are any physician concerns regarding this report, a Radiologist can be reached by calling the following number [phone]. Mri Thoracic Spine W Wo Contrast    Result Date: 12/13/2020  1. Metastatic lesions in the T8 and T12 vertebral bodies, more prominent in the former. 2. Pathological fracture of the T8 vertebral body with approximately 40% loss of height. 3. Epidural extension of tumor along the left lateral aspect at T8, extending into the left T8-9 neural foramina. 4. Moderate central canal stenosis at T8. 5. No cord lesion or cord edema seen. Mri Lumbar Spine W Wo Contrast    Result Date: 12/13/2020  1. Metastatic lesion in the L3 vertebral body, extending into the right pedicle and superior articular facet. Pathological fracture of L3, with approximately 60% loss of height. 2. Metastatic infiltration along the endplates L3 and L4. No pathological fracture seen at these levels. 3. Infiltration of tumor into the epidural space at L3 resulting in [address], LATERAL RECESSES AND THE RIGHT NEURAL FORAMINA. 4. Large right paraspinal mass is contiguous with the bony metastatic lesions and extends from the level of L2 to L5-S1. The mass exerts mass effect on the psoas, which is laterally displaced. Areas of cystic degeneration are seen right paraspinal mass at the level of L4 and L5. 5. Partially included in the field of view of this study is a small metastatic lesion in the left iliac bone    Ct Abdomen Pelvis W Iv Contrast Additional Contrast? None    Result Date: 12/12/2020  Hypodense lesions in the right lobe of the liver, the larger measuring 3.5 cm in diameter and was consistent with metastatic disease. Mildly heterogeneous mass extending cephalad from the head of the pancreas, most suspicious for pancreatic malignancy measuring approximately 2.4 x 3 x 3.2 cm in diameter. Multiple lytic lesions as described involving the T8 vertebral body, T12 vertebral body, L3 vertebral body, and proximal left femur. Pathologic compression fracture of L3 and extensive extension of malignant process to the paraspinal soft tissues anteriorly and to the right of L3, to the adjacent intervertebral discs, and spinal canal.  Extension into the right pedicle and right articular facet and large component extending to the right iliopsoas muscle which is asymmetrically enlarged. Hypodense process extending caudally along the course of the right psoas muscle with mild peripheral enhancement, likely malignant involvement versus associated abscess, felt to be a less likely consideration. Intraspinal extension of malignant process at the level of T8, and L3. Ct Needle Biopsy Liver Percutaneous    Result Date: 12/16/2020  Successful uncomplicated CT and fluoroscopic guided liver mass biopsy. If there are any physician concerns regarding this report, a Radiologist can be reached by calling the following number [phone]. Xr Eye Foreign Body    Result Date: 12/13/2020  No evidence of metallic foreign body within the orbits. Nm Bone Scan Whole Body    Result Date: 12/14/2020  Findings compatible with degenerative changes Multiple foci of abnormal tracer uptake, consistent widespread metastatic disease. Mri Brain W Wo Contrast    Result Date: 12/14/2020  1. There is a small extra-axial enhancing mass, which appears to span the right and left aspects of the mid falx. This measures up to 16 mm. No significant adjacent parenchymal edema is seen. This may represent a meningioma. However, given history of malignancy, suggest a short-term follow-up examination in 3 months to evaluate for stability. 2. Otherwise, no acute intracranial abnormality. No acute infarct. 3. Mild global parenchymal volume loss with mild-to-moderate chronic microvascular ischemic changes.     ASSESSMENT:    Principal Problem:    Lung mass  Active Problems:    Pathologic lumbar vertebral fracture, initial encounter    Malignant neoplasm metastatic to bone [facility])    Metastatic cancer to liver [facility])    Lung cancer metastatic to bone [facility])  Resolved Problems:    * No resolved hospital problems. *       PLAN:    Palliative services is managing his pain  Await further testing reports to decide about treatment plan. He is aware of his diagnosis however tells me he will go down fighting  Leukocytosis noted. Potassium again slightly low and will need replenished  December 20, 2020  Appears otherwise okay. Labs showing decreasing leukocytosis. Slightly low on potassium yet again. Appears comfortable. Await all the results and tests and final plan for treatment  Lower Norvasc and hydralazine    December 21, 2020  Blood pressure still on the lower side. DC amlodipine. His Lasix will be decreased from 40 mg to 20 mg. Hydralazine from 50-25. Continue with palliative care. Continue to follow labs. Appreciate multidisciplinary approach    12/22:  Discharge plan discussed with the patient  Subacute rehab is being you are considered  He is yet to make a decision    12/23:  Needs ANGEL  He is agreeable atleast when I spoke with him  Discussed with     Diet: DIET GENERAL;  Dietary Nutrition Supplements: Standard High Calorie Oral Supplement  Code Status: Prior    DVT Prophylaxis:   In place  Recommended disposition at discharge:  unsure yet    +++++++++++++++++++++++++++++++++++++++++++++++++  Suzie Hood MD   [facility].  +++++++++++++++++++++++++++++++++++++++++++++++++  NOTE: This report was transcribed using voice recognition software. Every effort was made to ensure accuracy; however, inadvertent computerized transcription errors may be present.

## 2020-12-23 NOTE — PROGRESS NOTES
to w/c. Ambulation    NT  To be determined. Stair negotiation: ascended and descended  NT     ROM BUE:  See OT eval  BLE:  wfl for PROM. Strength BUE: See OT eval   RLE: 1/5  LLE:   3/5 knee ext 3/5 hip  4-/5   Balance Sitting EOB:  SBA with TLSO on. Used BUE to support himself. Dynamic Standing:  NT  Sitting EOB:  Ind  Dynamic Standing: Mod     Patient is Alert & Oriented x person, place, time and situation and follows directions   Sensation:  Pt denies numbness and tingling to extremities  Edema:  none    Patient education  Patient educated on role of Physical Therapy, risks of immobility, safety and plan of care and  importance of mobility while in hospital      Patient response to education:   Pt verbalized understanding Pt demonstrated skill Pt requires further education in this area   no       ASSESSMENT:    Comments:  Patient was seen this date for PT evaluation. . Patient was initially not agreeable to session. Very anxious stating everyone is rushing him. Became argumentative with question and when provided cues for mobility. Results of the functional assessment are noted above. Onlly sat EOB for 5 minute max period. At end of session, patient in bed with TLSO removed call light and phone within reach,  all lines and tubes intact, nursing notified. This patient can benefit from the continuation of skilled PT  to maximize functional level and return to PLOF. Treatment:  Patient practiced and was instructed in the following treatment:    · Bed mobility training - pt given verbal and tactile cues to facilitate proper sequencing and safety during rolling and supine>sit as well as provided with physical assistance to complete task       Pt's/ family goals   1. None stated. Patient and or family understand(s) diagnosis, prognosis, and plan of care. yes    PLAN OF CARE:    PT care will be provided in accordance with the objectives noted above.  Exercises and functional mobility practice will be used as well as appropriate assistive devices or modalities to obtain goals. Patient and family education will also be administered as needed. Current Treatment Recommendations     [x] Strengthening     [] ROM   [x] Balance Training   [x] Endurance Training   [x] Transfer Training   [] Gait Training   [] Stair Training   [] Positioning   [] Safety and Education Training   [] Patient/Caregiver Education   [] HEP  [] Other     Frequency of treatments: 2-5x/week x 1-2 weeks. Time in  1425  Time out  1450    Total Treatment Time  25 minutes     Evaluation Time includes thorough review of current medical information, gathering information on past medical history/social history and prior level of function, completion of standardized testing/informal observation of tasks, assessment of data and education on plan of care and goals.     CPT codes:  [x] Low Complexity PT evaluation 66426  [] Moderate Complexity PT evaluation 36535  [] High Complexity PT evaluation 35317  [] PT Re-evaluation 14804  [] Gait training 43854 - minutes  [] Manual therapy 91120 - minutes  [x] Therapeutic activities 19660 10 minutes  [] Therapeutic exercises 17558 - minutes  [] Neuromuscular reeducation 27674 - minutes       Cayden Dewitt, [id number] [facility]

## 2020-12-23 NOTE — PROGRESS NOTES
OCCUPATIONAL THERAPY INITIAL EVALUATION      Date:2020  Patient Name: April Green  MRN: [id number]  : 1970  Room: [address] [place] [address]    Evaluating OT: Charu Chau. Odilon Zhao OTR/L #[id number]    Referring physician: Carolina Fraser MD  -[facility] Daily Activity Raw Score:   Recommended Adaptive Equipment: ww, AE for LE dressing and bathing, [facility]     Diagnosis: Pathologic lumbar vertebral fracture with metastatic cance       Pertinent Medical History:   Past Medical History:   Diagnosis Date    Acute kidney injury ([facility] [address] 2017    Cocaine abuse ([facility] [address] 2017    Hepatitis C 1990    Heroin abuse ([facility] [address] 2017    Hyperkalemia 2017    Hypertension     Liver disease     Non-traumatic rhabdomyolysis 2017    Psychiatric problem         Precautions:  Falls, spinal , TLSO brace, Hep C , drug abuse, TSM - safety- impulsive     Home Living: Pt lives with friend in a 2 story home  with one step(s) to enter and no rail(s); bed/bath on basement level full flight with HR   Bathroom setup: tub shower on main floor and standard commode   Equipment owned: crutches  Prior Level of Function:   Released from incarceration and Independent with ADLs ,  Independent with IADLs; using no AD  for ambulation. Medication Management self  Occupation: history of     Pain Level: 10/10 back and LE's  Cognition: A&O: 3/3; Follows one step directions with impulsivity and decreased safety noted.    Memory:  Fair+   Sequencing:  poor   Problem solving:  poor   Judgement/safety:  poor     Functional Assessment:   Initial Eval Status  Date: 20 Treatment Status  Date: Short Term Goals=LTG  Treatment frequency: PRN 2-4 x/week   Feeding Noted to be setup and ate supine in bed   Mod I    Grooming SBA  setup    UB Dressing Max A for brace  Setup for brace   LB Dressing Max A   Mod I with AE prn    Bathing Max A   Min A     Toileting Able to manage urinal, max A   Min A    Bed Mobility  Supine throughout treatment. OT treatment provided this date includes:  ·  ADL-  Instruction/training on safety and adapted techniques for completion of ADLs: Pt. Demonstrates fair understanding of precautions & techniques  ·  Functional Mobility-  Instruction/training on safety and improved independence with bed mobility, /functional transfers using ww as needed-refused this date. ·  Sitting EOB 3 minutes to improve dynamic sitting balance and activity tolerance during ADLs. ·  Activity tolerance- Instruction/training on energy conservation/work simplification for completion of ADLs:. Pt. Demo poor tolerance due to pain this day   ·  Cognitive retraining -  Cues for safety/technique during bed mobility, sitting balance/seated ADLs, no cues for sequencing, problem solving WFL  ·  Skilled positioning/alignment-  Proper Positioning/Alignment due to spinal mechanics  ·  Skilled monitoring of O2 sats-  refer to activity tolerance reporting  · Therapeutic Exercise- Instruction on benefits of movement and deep breathing for exercise to improve functional Ind. with ADLs             Patient would  benefit from continued skilled OT to increase functional independence and quality of life.     Eval Complexity: mod    Assessment of current deficits   Functional mobility [x]  ADLs [x] Strength [x]  Cognition [x]  Functional transfers  [x] IADLs [x] Safety Awareness [x]  Endurance [x]  Fine Motor Coordination [] Balance [x] Vision/perception [] Sensation [x]   Gross Motor Coordination [x] ROM [] Delirium []                  Motor Control []    Plan of Care: OT 1-3x/week for 5-7 days PRN   Instruction/training on adapted ADL techniques and AE recommendations to increase functional independence within precautions  Training on energy conservation strategies/techniques to improve independence/tolerance for self-care routine  Functional transfer/mobility training/DME recommendations for increased independence, safety, and fall prevention  Patient/Family education to increase follow through with safety techniques and functional independence  Recommendation of environmental modifications for increased safety with functional transfers/mobility and ADLs  Sensory re-education to improve body/limb awareness, maintain/improve skin integrity, and improve hand/UE motor function  Therapeutic exercise to improve motor endurance, ROM, and functional strength for ADLs/functional transfers  Neuro-muscular re-education: facilitation of righting/equilibrium reactions, midline orientation, scapular stability/mobility, normalization of muscle tone, and facilitation of volitional active controled movement  Positioning to improve functional independence    Rehab Potential: Good for established goals    Patient / Family Goal: decrease pain     Evaluation time includes thorough review of current medical information, gathering information on past medical & social history & PLOF, completion of standardized testing, informal observation of tasks, consultation with other medical professions/disciplines, assessment of data & development of POC/goals. Patient and/or family were instructed diagnosis, prognosis/goals and plan of care. yes Demonstrated fair understanding. Min Units   OT Eval Low 36673     OT Eval Medium 56859 X    OT Eval High [id number]     OT Re-Eval [id number]     Therapeutic Ex [phone]     Therapeutic Activities 35580 15 1   ADL/Self Care 26607     Orthotic Management 74233     Neuro Re-Ed 62726     Non-Billable Time              Time In: 14:25           Time Out: 14:50               [] Malnutrition indicators have been identified and nursing has been notified to ensure a dietitian consult is ordered. mod OT Evaluation + 15  treatment minutes    Poppy Serum.  Laura [address], [address]

## 2020-12-23 NOTE — PROGRESS NOTES
Adri Arredondo  12/23/2020  Wt Readings from Last 3 Encounters:   12/13/20 195 lb (88.5 kg)   12/04/20 212 lb (96.2 kg)   07/28/17 225 lb 11.2 oz (102.4 kg)     There is no height or weight on file to calculate BMI. Treatment Area: 3 treatment sites:T7-T12, L2-sacrum, hip/femur    Patient was seen today for weekly visit. Comfort Alteration  KPS:50%  Fatigue: Moderate      Nutritional Alteration  Anorexia: No   Nausea: No   Vomiting: No     Elimination Alterations  Constipation: no  Diarrhea:  no  Bowel Incontinence: No  Urinary Incontinence: No    Skin Alteration   Sensation:na    Radiation Dermatitis:  na      Emotional  Coping: somewhat effective    Sexuality Alteration  na    Injury, potential bleeding or infection: na        Lab Results   Component Value Date    WBC 19.8 (H) 12/23/2020     12/23/2020       There were no vitals taken for this visit. BP within normal range?  Yes, inpatient documentation reviewed         Assessment/Plan: patient has completed 2/10 fractions to each site, 600 cGy/3000    Jenniferalvin Aviles

## 2020-12-23 NOTE — PROGRESS NOTES
Inpatient Medical Oncology Progress Note    Subjective:  No fever. Receiving RT     Objective:  BP (!) 138/90   Pulse 103   Temp 97 °F (36.1 °C) (Temporal)   Resp 16   Ht 5' 9\" (1.753 m)   Wt 195 lb (88.5 kg)   SpO2 94%   BMI 28.80 kg/m²   GENERAL: alert oriented x 3 not in distress  HEENT: PERRLA; EOMI. Oropharynx clear. NECK: Supple. No palpable lymphadenopathy. LUNGS: Fair air entry bilaterally  CARDIOVASCULAR: RRR. No murmurs. ABDOMEN: Soft. Non-tender, non-distended. Positive bowel sounds. EXTREMITIES: Without clubbing, cyanosis, or edema. NEUROLOGIC: No focal deficits.    ECOG PS 1-2    Diagnostics:  Lab Results   Component Value Date    WBC 19.8 (H) 12/23/2020    HGB 12.8 12/23/2020    HCT 37.1 12/23/2020    MCV 91.4 12/23/2020     12/23/2020     Lab Results   Component Value Date     12/23/2020    K 4.3 12/23/2020    CL 92 (L) 12/23/2020    CO2 26 12/23/2020    BUN 28 (H) 12/23/2020    CREATININE 0.9 12/23/2020    GLUCOSE 103 (H) 12/23/2020    CALCIUM 10.2 12/23/2020    PROT 6.8 12/23/2020    LABALBU 3.7 12/23/2020    BILITOT 0.5 12/23/2020    ALKPHOS 66 12/23/2020    AST 33 12/23/2020    ALT 48 (H) 12/23/2020    LABGLOM >60 12/23/2020    GFRAA >60 12/23/2020     Lab Results   Component Value Date    .7 (H) 12/14/2020      123 on 12/14/2020  Chromogranin A 161 on 12/14/2020    Impression/Plan:  24-year-old male patient, who presented with worsening back pain, found to have metastatic disease, large heterogeneous left hilar mass measuring about 5.4 cm, with occlusion of the left upper lobe bronchus with severe bronchial narrowing to the left lower lobe, left lung apex lesion measuring 1.5 cm, mediastinal adenopathy, metastasis to the liver, possibly the pancreas and the spine  MRI Thoracic spine had revealed metastatic lesions in the T8 and T12 vertebral bodies, pathologic fracture of the T8 vertebral body with approximately 40% loss of height, epidural extension of tumor along the left lateral aspect of the T8, extending into the left T8-9 neural foramina, moderate central canal stenosis at T8, no cord lesion or cord edema seen, he also has a destructive lesion along the right lateral aspect of the L3 vertebral body   MRI Lumbar spine revealed metastatic lesion in the L3 vertebral body, extending into the right pedicle and superior sacral facets, lateral fracture of L3 with approximately 60% loss of height, metastatic infiltration along the endplates D8-P6, infiltration of tumor into the epidural space at L3 resulting in severe stenosis of the central canal lateral recess on the right neural foramina: Mild right paraspinal mass contiguous with the venous occlusions and extending from the level of L2-L5 S1, the mass exerts mass-effect on the sulcus which is laterally displaced. Small metastatic lesion in the left iliac bone. The patient has a probable lung cancer, he has a widely metastatic disease. MRI Brain revealed a small extra-axial enhancing mass, measuring up 16mm, this may represent a meningioma, however given her history of malignancy short-term follow-up in 3 months was recommended. Bone scan revealed widespread metastatic disease to the bones. Evaluated by neurosurgery, no surgical interventions recommended. Rad Onc for palliative RT to the spine. Currently receiving RT  Pain Control. Palliative medicine team on board. S/P liver biopsy 12/14/2020  Bronchoscopy/EBUS 12/16/2020 by Dr. Lino Hernandez from pulmonary team.   Findings included Large endobronchial mass completely obstructing the distal left mainstem  Pathology from the left endobronchial mass and liver are consistent with non-small cell lung cancer, squamous cell carcinoma with basaloid features. In conjunction with the clinical history of a lung mass, the tumor seen in the liver most likely represents metastasis from a lung primary. PD-L1 pending.   Await PD-L1 results to formulate the final treatment plan, NGS (foundation 1 testing) ordered. Continue RT at this time. Will continue to follow.     12/23/2020  Vikas Carson MD

## 2020-12-23 NOTE — PROGRESS NOTES
Coordination of care discussion and chart review with PM team. No immediate PM psychosocial needs identified for Sole Restrepo met with pt to provide HCPOA and Living Will forms.  will remain available for on-going psychosocial support, as needed.

## 2020-12-23 NOTE — PROGRESS NOTES
DEPARTMENT OF RADIATION ONCOLOGY ON TREATMENT VISIT         12/23/2020      NAME:  Santana Ortiz    YOB: 1970    Diagnosis:  Bone mets, spine and pelvis    SUBJECTIVE:   Santana Ortiz has now received fractionated external beam radiation therapy - ongoing. Past medical, surgical, social and family histories reviewed and updated as indicated. Pain: more controlled today    ALLERGIES:  Patient has no known allergies. No current facility-administered medications for this encounter.       Current Outpatient Medications   Medication Sig Dispense Refill    albuterol sulfate HFA (PROVENTIL HFA) 108 (90 Base) MCG/ACT inhaler Inhale 2 puffs into the lungs every 6 hours as needed for Wheezing 1 Inhaler 3     Facility-Administered Medications Ordered in Other Encounters   Medication Dose Route Frequency Provider Last Rate Last Admin    mineral oil enema 1 enema  1 enema Rectal Once FABI Girard        sennosides-docusate sodium (SENOKOT-S) 8.6-50 MG tablet 2 tablet  2 tablet Oral BID FABI Girard   2 tablet at 12/22/20 1629    oxyCODONE (OXY-IR) immediate release tablet 15 mg  15 mg Oral Q4H PRN FABI Girard        Or    oxyCODONE HCl (OXY-IR) immediate release tablet 20 mg  20 mg Oral Q4H PRN FABI Girard   20 mg at 12/23/20 0416    furosemide (LASIX) tablet 20 mg  20 mg Oral Daily Alonzo Bourne MD   20 mg at 12/23/20 0855    hydrALAZINE (APRESOLINE) tablet 25 mg  25 mg Oral 3 times per day Alonzo Bourne MD   25 mg at 12/23/20 0700    pantoprazole (PROTONIX) tablet 40 mg  40 mg Oral QAM AC Alonzo Bourne MD   40 mg at 12/23/20 0659    potassium bicarb-citric acid (EFFER-K) effervescent tablet 20 mEq  20 mEq Oral BID Alonzo Bourne MD   20 mEq at 12/23/20 0855    dexamethasone (DECADRON) tablet 4 mg  4 mg Oral 3 times per day Sandrine Gaming MD   4 mg at 12/23/20 0700    potassium chloride (KLOR-CON M) extended release tablet 40 mEq  40 mEq Oral Once [facility] Ronald Malave MD        polyethylene glycol (GLYCOLAX) packet 17 g  17 g Oral Daily Ceci Huerta MD   17 g at 12/21/20 1032    lidocaine PF 1 % injection 5 mL  5 mL Nebulization Once Ronel Hale MD        sodium chloride flush 0.9 % injection 10 mL  10 mL Intravenous 2 times per day Ceci Huerta MD   10 mL at 12/21/20 1036    sodium chloride flush 0.9 % injection 10 mL  10 mL Intravenous PRN Ceci Huerta MD        LORazepam (ATIVAN) tablet 1 mg  1 mg Oral Q4H PRN Ceci Huerta MD   1 mg at 12/18/20 1046    LORazepam (ATIVAN) injection 1 mg  1 mg Intravenous Q6H PRN Ceci Huerta MD   1 mg at 12/18/20 0052    methadone (DOLOPHINE) tablet 5 mg  5 mg Oral 3 times per day Joseph Janene, DO   5 mg at 12/23/20 2543    gabapentin (NEURONTIN) capsule 400 mg  400 mg Oral TID Joseph Janene, DO   400 mg at 12/23/20 2381    gadobenate dimeglumine (MULTIHANCE) injection 20 mL  20 mL Intravenous ONCE PRN Davidson Mendes DO        bisacodyl (DULCOLAX) suppository 10 mg  10 mg Rectal PRN FABI Mathews        carvedilol (COREG) tablet 25 mg  25 mg Oral BID WC Catrachito Can, DO   25 mg at 80/03/49 6868    folic acid (FOLVITE) tablet 1 mg  1 mg Oral Daily Catrachito Can, DO   1 mg at 12/23/20 0855    mirtazapine (REMERON) tablet 15 mg  15 mg Oral Nightly Catrachito Can, DO   15 mg at 12/22/20 2323    acetaminophen (TYLENOL) tablet 650 mg  650 mg Oral Q4H PRN Catrachito Can DO   650 mg at 12/21/20 2010    sodium chloride flush 0.9 % injection 10 mL  10 mL Intravenous PRN Jules Grullon MD   10 mL at 12/13/20 1230    enoxaparin (LOVENOX) injection 40 mg  40 mg Subcutaneous Daily Braulio Bunn MD   40 mg at 12/23/20 5830           OBJECTIVE:  Alert and fully ambulatory. Pleasant and conversant. Physical Examination: General appearance - alert, well appearing, and in no distress.           Wt Readings from Last 3 Encounters:   12/13/20 195 lb (88.5 kg)   12/04/20 212 lb (96.2 kg)   07/28/17 225 lb 11.2 oz (102.4 kg)         ASSESSMENT/PLAN:     Patient is tolerating treatments well with expected toxicities. RBA were reviewed prior to first fraction and PRN. Current and planned dose reviewed. Goals of treatment and potential side effects were reviewed with the patient PRN. Treatment imaging has been personally reviewed for accuracy and precision. Questions answered to apparent satisfaction. Treatments will continue as planned. Les Gaffney.  Lily Arnold MD MS DABR  Radiation Oncologist        [facility] ([address] [place] [address]): [phone] /// FAX: [phone]  [facility]): [phone] /// FAX: [phone]  [facility]): [phone] /// FAX: [phone]

## 2020-12-24 NOTE — PROGRESS NOTES
Patient upset that this nurse didn't go in earlier to wash out his food bowls that came with his tray, which he refused for me to take earlier. Patient states he's been calling with no response. This nurse asked if he put on the call light, he stated no. Patient asking to smoke, I told him again, that he cannot smoke. Telesitter still in place.

## 2020-12-24 NOTE — PROGRESS NOTES
Telesitter called stating that patient looked like he was vaping/smoking something and there was smoke. Walked in and patient had a cigarette in his mouth. Educated patient on the dangers of smoking in the hospital d/t oxygen flowing in the walls. Patient stated understanding. This nurse put the cigarette out and threw it away.

## 2020-12-24 NOTE — PROGRESS NOTES
Darling Wilson M.D.,[facility]  Mony Gonzalez D.O., F.A.C.O.I., Swati Sarmiento M.D. Stevie Cohen M.D., Pearson Favre ,M.D. Saúl Goldman D.O. Daily Pulmonary Progress Note    Patient:  Landis Paget 44 y.o. male MRN: [id number]     Date of Service: 12/23/2020      Synopsis     We are following patient for lung mass with mets to liver and bone    \"CC\" confused     Code status:prior      Subjective        Denies any compliants   No acute events over night  Review of Systems:  Constitutional: Denies fever, weight loss, night sweats, and fatigue  Skin: Denies pigmentation, dark lesions, and rashes   HEENT: Denies hearing loss, tinnitus, ear drainage, epistaxis, sore throat, and hoarseness. Cardiovascular: Denies palpitations, chest pain, and chest pressure. Respiratory: Denies cough, dyspnea at rest and with any to believe, hemoptysis, apnea, and choking.   Gastrointestinal: Denies nausea, vomiting, poor appetite, diarrhea, heartburn or reflux  Genitourinary: Denies dysuria, frequency, urgency or hematuria  Musculoskeletal: Denies myalgias, muscle weakness, and +bone pain, +back pain     24-hour events:  Pain control    Objective   Vitals: /85   Pulse 103   Temp 97 °F (36.1 °C) (Temporal)   Resp 16   Ht 5' 9\" (1.753 m)   Wt 195 lb (88.5 kg)   SpO2 94%   BMI 28.80 kg/m²     I/O:    Intake/Output Summary (Last 24 hours) at 12/23/2020 2320  Last data filed at 12/23/2020 1410  Gross per 24 hour   Intake 600 ml   Output 1250 ml   Net -650 ml       Vent Information  FiO2 : 2 %  SpO2: 94 %  SpO2/FiO2 ratio: 4750                CURRENT MEDS :  Scheduled Meds:   morphine  4 mg Intravenous Daily    mineral oil  1 enema Rectal Once    sennosides-docusate sodium  2 tablet Oral BID    furosemide  20 mg Oral Daily    hydrALAZINE  25 mg Oral 3 times per day    pantoprazole  40 mg Oral QAM AC    potassium bicarb-citric acid  20 mEq Oral BID    dexamethasone  4 mg Oral 3 times per day    potassium chloride  40 mEq Oral Once    lidocaine PF  5 mL Nebulization Once    sodium chloride flush  10 mL Intravenous 2 times per day    methadone  5 mg Oral 3 times per day    gabapentin  400 mg Oral TID    carvedilol  25 mg Oral BID WC    folic acid  1 mg Oral Daily    mirtazapine  15 mg Oral Nightly    enoxaparin  40 mg Subcutaneous Daily       Physical Exam:  General Appearance: appears comfortable in no acute distress. HEENT: Normocephalic atraumatic without obvious abnormality   Neck: Lips, mucosa, and tongue normal.  Supple, symmetrical, trachea midline, no adenopathy;thyroid:  no enlargement/tenderness/nodules or JVD. Lung: Breath sounds CTA. Respirations   unlabored. Symmetrical expansion. Heart: RRR, normal S1, S2. No MRG  Abdomen: Soft, NT, ND. BS present x 4 quadrants. No bruit or organomegaly. Extremities: Pedal pulses 2+ symmetric b/l. Extremities normal, no cyanosis, clubbing, or edema. Musculokeletal: No joint swelling, no muscle tenderness. ROM normal in all joints of extremities. Neurologic: Mental status: Alert and Oriented X3 . Pertinent/ New Labs and Imaging Studies     Imaging Personally Reviewed:none  Labs:  Lab Results   Component Value Date    WBC 19.8 12/23/2020    HGB 12.8 12/23/2020    HCT 37.1 12/23/2020    MCV 91.4 12/23/2020    MCH 31.5 12/23/2020    MCHC 34.5 12/23/2020    RDW 13.0 12/23/2020     12/23/2020    MPV 9.5 12/23/2020     Lab Results   Component Value Date     12/23/2020    K 4.3 12/23/2020    CL 92 12/23/2020    CO2 26 12/23/2020    BUN 28 12/23/2020    CREATININE 0.9 12/23/2020    LABALBU 3.7 12/23/2020    CALCIUM 10.2 12/23/2020    GFRAA >60 12/23/2020    LABGLOM >60 12/23/2020     Lab Results   Component Value Date    PROTIME 12.7 12/14/2020    INR 1.1 12/14/2020     No results for input(s): PROBNP in the last 72 hours. No results for input(s): PROCAL in the last 72 hours.   This SmartLink has not been configured with any valid records. Micro:  No results for input(s): CULTRESP in the last 72 hours. No results for input(s): LABGRAM in the last 72 hours. No results for input(s): LEGUR in the last 72 hours. No results for input(s): STREPNEUMAGU in the last 72 hours. No results for input(s): LP1UAG in the last 72 hours. Assessment:    1. Lung cancer with metastasis to thoracis and lumbar spine,2.4x3x3.2 cm diameter  pancreas, and liver, brain   2. 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 Polysubstance abuse , hx of overdose 2017  4. Major depressive disorder   5. Nicotine dependence       Plan:   1. Keep pulse oximetry >92% , currenlty on RA 98%  2. palliative medicine following managing morphine PCA  3. xrt  4. IR liver biopsy 12/14 await path, s/p 12/16bronchoscopy with Large Heterogeneous vascular endobronchial lesion obstructing the left distal mainstem  5. Oncology following -bone scan with multiple metastatic lesions widespread  6. neurosurg following ,back brace   7.  palliative med managing pain      Christopher Samano M.D.    Pulmonary/Critical Care Medicine

## 2020-12-24 NOTE — CARE COORDINATION
12/24/2020 SOCIAL WORK TRANSITION OF CARE PLANNING  Sw/Cm followed up with pt to discuss transition of care planning. Sw/cm discussed placement will be difficult,due to pt recently getting out of retirement (12/1) and needing radiation treatments. Pt not in full agreement,although he is aware that he is unable to ambulate and in need of placement in a facility. Sw made referral to [facility]). [facility] has already declined. Shanda/bryson will follow.   Electronically signed by CAROLINA Case on 12/24/2020 at 1:51 PM

## 2020-12-24 NOTE — PROGRESS NOTES
Hospitalist Progress Note      Synopsis: Patient admitted on 12/12/2020    Subjective  Patient admitted on the 12th with severe back pain  For the investigations have resulted in a diagnosis of left lung apex lesion with adenopathy with metastasis to the liver and spine. Also evaluated by neurosurgery with no intervention recommended. Palliative treatment to the spine. Palliative team following as well as pain control being attempted. He is already had bronchoscopy with EBUS. 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 Awaiting PD-L1 results to finalize  Dec 20 2020  Other than constipation he appears to be comfortable. Awaiting his radiation plan  December 21, 2020  Vital remained stable and he remains in a good mood. Starts radiation treatment tomorrow  At his baseline for his alertness and is quite alert. Eating well.    12/22:  Patient returned from radiation oncology  Denies any new complaints  He reports that he is bombarded with information and cannot make up his mind yet    12/23:  Same routine -too much info, can't handle it    12/24  He still has pain but states it is much better  He is moving about the bed  He voices no other complaints    Exam:  /82   Pulse 99   Temp 98.2 °F (36.8 °C) (Temporal)   Resp 16   Ht 5' 9\" (1.753 m)   Wt 195 lb (88.5 kg)   SpO2 94%   BMI 28.80 kg/m²   General appearance: No apparent distress, appears stated age and cooperative. HEENT: Pupils equal, round, and reactive to light. Conjunctivae/corneas clear. Neck: Supple. No jugular venous distention. Trachea midline. Respiratory:  Normal respiratory effort. Clear to auscultation, bilaterally without Rales/Wheezes/Rhonchi. Cardiovascular: Regular rate and rhythm with normal S1/S2 without murmurs, rubs or gallops.   Abdomen: Soft, non-tender, slightly distended distended with normal bowel 51* 48* 49*   AST 32 33 40*   BILITOT 0.4 0.5 0.7       No results for input(s): INR in the last 72 hours. No results for input(s): Susana Hernandez in the last 72 hours. Chronic labs:  Lab Results   Component Value Date    INR 1.1 12/14/2020       Radiology:  Xr Femur Left (min 2 Views)    Result Date: 12/13/2020  1. Lytic lesion in the left femoral neck. No additional evidence of osseous metastasis on this exam.  No evidence of pathologic fracture. 2.  Mild left hip joint osteoarthritis. Ct Chest W Contrast    Result Date: 12/12/2020  Large heterogeneous left hilar mass as described consistent with malignancy. Occlusion of left upper lobe bronchus and severe bronchial narrowing to the left lower lobe. Mildly spiculated nodular lesion, pleural based in the left lung apex likely satellite malignant lesion measuring 1.5 cm. Mediastinal adenopathy likely manifesting malignant involvement. Large metastatic lesion to the T8 vertebral body with paraspinal an intraspinal extension of malignancy and pathologic compression fracture of T8. Ct Lumbar Spine Wo Contrast    Result Date: 12/12/2020  1. Lytic destructive lesion along the right lateral aspect of the L3 vertebral body, involving the right pedicle, transverse process and superior articular facet. 2. Pathological fracture on the right with loss of height by approximately 60%. The lesion may represent malignancy or an infectious process. Destructive changes in the discs characteristic of discitis/osteomyelitis are not evident on this study. Further evaluation with pre and post contrast enhanced MRI is recommended. 3. Soft tissue fullness extends from the L3 vertebral body into the adjacent psoas muscle, which is landis than the contralateral side. The soft tissue component may represent malignancy or infection. 4. Extent of epidural involvement of disease at L3 is not well delineated on this study. Pre and post contrast enhanced MRI should clarify. 5. Variable degrees of degenerative changes, as described. Ct Guided Needle Placement    Result Date: 12/16/2020  Successful uncomplicated CT and fluoroscopic guided liver mass biopsy. If there are any physician concerns regarding this report, a Radiologist can be reached by calling the following number [phone]. Mri Thoracic Spine W Wo Contrast    Result Date: 12/13/2020  1. Metastatic lesions in the T8 and T12 vertebral bodies, more prominent in the former. 2. Pathological fracture of the T8 vertebral body with approximately 40% loss of height. 3. Epidural extension of tumor along the left lateral aspect at T8, extending into the left T8-9 neural foramina. 4. Moderate central canal stenosis at T8. 5. No cord lesion or cord edema seen. Mri Lumbar Spine W Wo Contrast    Result Date: 12/13/2020  1. Metastatic lesion in the L3 vertebral body, extending into the right pedicle and superior articular facet. Pathological fracture of L3, with approximately 60% loss of height. 2. Metastatic infiltration along the endplates L3 and L4. No pathological fracture seen at these levels. 3. Infiltration of tumor into the epidural space at L3 resulting in [address], LATERAL RECESSES AND THE RIGHT NEURAL FORAMINA. 4. Large right paraspinal mass is contiguous with the bony metastatic lesions and extends from the level of L2 to L5-S1. The mass exerts mass effect on the psoas, which is laterally displaced. Areas of cystic degeneration are seen right paraspinal mass at the level of L4 and L5. 5. Partially included in the field of view of this study is a small metastatic lesion in the left iliac bone    Ct Abdomen Pelvis W Iv Contrast Additional Contrast? None    Result Date: 12/12/2020  Hypodense lesions in the right lobe of the liver, the larger measuring 3.5 cm in diameter and was consistent with metastatic disease.  Mildly heterogeneous mass extending cephalad from the head of the pancreas, most suspicious for pancreatic malignancy measuring approximately 2.4 x 3 x 3.2 cm in diameter. Multiple lytic lesions as described involving the T8 vertebral body, T12 vertebral body, L3 vertebral body, and proximal left femur. Pathologic compression fracture of L3 and extensive extension of malignant process to the paraspinal soft tissues anteriorly and to the right of L3, to the adjacent intervertebral discs, and spinal canal.  Extension into the right pedicle and right articular facet and large component extending to the right iliopsoas muscle which is asymmetrically enlarged. Hypodense process extending caudally along the course of the right psoas muscle with mild peripheral enhancement, likely malignant involvement versus associated abscess, felt to be a less likely consideration. Intraspinal extension of malignant process at the level of T8, and L3. Ct Needle Biopsy Liver Percutaneous    Result Date: 12/16/2020  Successful uncomplicated CT and fluoroscopic guided liver mass biopsy. If there are any physician concerns regarding this report, a Radiologist can be reached by calling the following number [phone]. Xr Eye Foreign Body    Result Date: 12/13/2020  No evidence of metallic foreign body within the orbits. Nm Bone Scan Whole Body    Result Date: 12/14/2020  Findings compatible with degenerative changes Multiple foci of abnormal tracer uptake, consistent widespread metastatic disease. Mri Brain W Wo Contrast    Result Date: 12/14/2020  1. There is a small extra-axial enhancing mass, which appears to span the right and left aspects of the mid falx. This measures up to 16 mm. No significant adjacent parenchymal edema is seen. This may represent a meningioma. However, given history of malignancy, suggest a short-term follow-up examination in 3 months to evaluate for stability. 2. Otherwise, no acute intracranial abnormality. No acute infarct.  3. Mild global parenchymal volume loss with mild-to-moderate chronic microvascular ischemic changes. ASSESSMENT:    Principal Problem:    Lung mass  Active Problems:    Pathologic lumbar vertebral fracture, initial encounter    Malignant neoplasm metastatic to bone [facility])    Metastatic cancer to liver [facility])    Lung cancer metastatic to bone [facility])  Resolved Problems:    * No resolved hospital problems. *       PLAN:    Palliative services is managing his pain  Await further testing reports to decide about treatment plan. He is aware of his diagnosis however tells me he will go down fighting  Leukocytosis noted. Potassium again slightly low and will need replenished  December 20, 2020  Appears otherwise okay. Labs showing decreasing leukocytosis. Slightly low on potassium yet again. Appears comfortable. Await all the results and tests and final plan for treatment  Lower Norvasc and hydralazine    December 21, 2020  Blood pressure still on the lower side. DC amlodipine. His Lasix will be decreased from 40 mg to 20 mg. Hydralazine from 50-25. Continue with palliative care. Continue to follow labs.   Appreciate multidisciplinary approach    12/22:  Discharge plan discussed with the patient  Subacute rehab is being you are considered  He is yet to make a decision    12/23:  Needs ANGEL  He is agreeable atleast when I spoke with him  Discussed with     12/24  Palliative medicine managing pain  Liver biopsy and bronchoscopy completed  Morphine PCA  Keep pulse oximetry over 92%  Bone scan shows multiple metastatic lesions  Transition to supportive care      Diet: DIET GENERAL;  Dietary Nutrition Supplements: Standard High Calorie Oral Supplement  Code Status: Prior    DVT Prophylaxis:   In place  Recommended disposition at discharge:  unsure yet    +++++++++++++++++++++++++++++++++++++++++++++++++  Chris Esquivel MD   [facility].  +++++++++++++++++++++++++++++++++++++++++++++++++  NOTE: This report was transcribed using voice recognition software. Every effort was made to ensure accuracy; however, inadvertent computerized transcription errors may be present.
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
## 2020-12-25 NOTE — PROGRESS NOTES
Hospitalist Progress Note      Synopsis: Patient admitted on 12/12/2020    Subjective  Patient admitted on the 12th with severe back pain  For the investigations have resulted in a diagnosis of left lung apex lesion with adenopathy with metastasis to the liver and spine. Also evaluated by neurosurgery with no intervention recommended. Palliative treatment to the spine. Palliative team following as well as pain control being attempted. He is already had bronchoscopy with EBUS. 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 Awaiting PD-L1 results to finalize  Dec 20 2020  Other than constipation he appears to be comfortable. Awaiting his radiation plan  December 21, 2020  Vital remained stable and he remains in a good mood. Starts radiation treatment tomorrow  At his baseline for his alertness and is quite alert. Eating well.    12/22:  Patient returned from radiation oncology  Denies any new complaints  He reports that he is bombarded with information and cannot make up his mind yet    12/23:  Same routine -too much info, can't handle it    12/24  He still has pain but states it is much better  He is moving about the bed  He voices no other complaints    12/25  He is quite comfortable  He is not acknowledging any problem at this point      Exam:  /78   Pulse 109   Temp 98 °F (36.7 °C) (Temporal)   Resp 20   Ht 5' 9\" (1.753 m)   Wt 195 lb (88.5 kg)   SpO2 98%   BMI 28.80 kg/m²   General appearance: No apparent distress, appears stated age and cooperative. HEENT: Pupils equal, round, and reactive to light. Conjunctivae/corneas clear. Neck: Supple. No jugular venous distention. Trachea midline. Respiratory:  Normal respiratory effort. Clear to auscultation, bilaterally without Rales/Wheezes/Rhonchi.   Cardiovascular: Regular rate and rhythm with normal S1/S2 without murmurs, rubs or gallops. Abdomen: Soft, non-tender, slightly distended distended with normal bowel sounds. Musculoskeletal: No clubbing, cyanosis or edema bilaterally. Brisk capillary refill. 2+ lower extremity pulses (dorsalis pedis).    Skin: Skin appears to be reactive with spots of erythema noted shoulders along the waist and on his back does not seem to be a specific rash  Neurologic: awake, alert and following commands and seems his same irritable self  His speech is rapid frequently interrupted, extremely labile affect    Medications:  Reviewed    Infusion Medications   Scheduled Medications    morphine  4 mg Intravenous Daily    mineral oil  1 enema Rectal Once    sennosides-docusate sodium  2 tablet Oral BID    furosemide  20 mg Oral Daily    hydrALAZINE  25 mg Oral 3 times per day    pantoprazole  40 mg Oral QAM AC    potassium bicarb-citric acid  20 mEq Oral BID    dexamethasone  4 mg Oral 3 times per day    potassium chloride  40 mEq Oral Once    lidocaine PF  5 mL Nebulization Once    sodium chloride flush  10 mL Intravenous 2 times per day    methadone  5 mg Oral 3 times per day    gabapentin  400 mg Oral TID    carvedilol  25 mg Oral BID WC    folic acid  1 mg Oral Daily    mirtazapine  15 mg Oral Nightly    enoxaparin  40 mg Subcutaneous Daily     PRN Meds: polyethylene glycol, oxyCODONE **OR** oxyCODONE, sodium chloride flush, LORazepam, LORazepam, gadobenate dimeglumine, bisacodyl, acetaminophen, sodium chloride flush    I/O    Intake/Output Summary (Last 24 hours) at 12/25/2020 0935  Last data filed at 12/25/2020 0817  Gross per 24 hour   Intake 480 ml   Output 2100 ml   Net -1620 ml       Labs:   Recent Labs     12/23/20  0556 12/24/20  0558 12/25/20  0504   WBC 19.8* 25.1* 28.2*   HGB 12.8 12.7 12.5   HCT 37.1 36.9* 36.8*    359 290       Recent Labs     12/23/20  0556 12/24/20  0558 12/25/20  0504    137 136   K 4.3 4.2 4.5   CL 92* 95* 92*   CO2 26 29 32*   BUN 28* 33* 30* CREATININE 0.9 0.9 1.0   CALCIUM 10.2 10.2 10.2       Recent Labs     12/23/20  0556 12/24/20  0558 12/25/20  0504   PROT 6.8 6.6 6.7   ALKPHOS 66 64 65   ALT 48* 49* 54*   AST 33 40* 36   BILITOT 0.5 0.7 0.7       No results for input(s): INR in the last 72 hours. No results for input(s): Larinda Tim in the last 72 hours. Chronic labs:  Lab Results   Component Value Date    INR 1.1 12/14/2020       Radiology:  Xr Femur Left (min 2 Views)    Result Date: 12/13/2020  1. Lytic lesion in the left femoral neck. No additional evidence of osseous metastasis on this exam.  No evidence of pathologic fracture. 2.  Mild left hip joint osteoarthritis. Ct Chest W Contrast    Result Date: 12/12/2020  Large heterogeneous left hilar mass as described consistent with malignancy. Occlusion of left upper lobe bronchus and severe bronchial narrowing to the left lower lobe. Mildly spiculated nodular lesion, pleural based in the left lung apex likely satellite malignant lesion measuring 1.5 cm. Mediastinal adenopathy likely manifesting malignant involvement. Large metastatic lesion to the T8 vertebral body with paraspinal an intraspinal extension of malignancy and pathologic compression fracture of T8. Ct Lumbar Spine Wo Contrast    Result Date: 12/12/2020  1. Lytic destructive lesion along the right lateral aspect of the L3 vertebral body, involving the right pedicle, transverse process and superior articular facet. 2. Pathological fracture on the right with loss of height by approximately 60%. The lesion may represent malignancy or an infectious process. Destructive changes in the discs characteristic of discitis/osteomyelitis are not evident on this study. Further evaluation with pre and post contrast enhanced MRI is recommended. 3. Soft tissue fullness extends from the L3 vertebral body into the adjacent psoas muscle, which is landis than the contralateral side.   The soft tissue component may represent malignancy or infection. 4. Extent of epidural involvement of disease at L3 is not well delineated on this study. Pre and post contrast enhanced MRI should clarify. 5. Variable degrees of degenerative changes, as described. Ct Guided Needle Placement    Result Date: 12/16/2020  Successful uncomplicated CT and fluoroscopic guided liver mass biopsy. If there are any physician concerns regarding this report, a Radiologist can be reached by calling the following number [phone]. Mri Thoracic Spine W Wo Contrast    Result Date: 12/13/2020  1. Metastatic lesions in the T8 and T12 vertebral bodies, more prominent in the former. 2. Pathological fracture of the T8 vertebral body with approximately 40% loss of height. 3. Epidural extension of tumor along the left lateral aspect at T8, extending into the left T8-9 neural foramina. 4. Moderate central canal stenosis at T8. 5. No cord lesion or cord edema seen. Mri Lumbar Spine W Wo Contrast    Result Date: 12/13/2020  1. Metastatic lesion in the L3 vertebral body, extending into the right pedicle and superior articular facet. Pathological fracture of L3, with approximately 60% loss of height. 2. Metastatic infiltration along the endplates L3 and L4. No pathological fracture seen at these levels. 3. Infiltration of tumor into the epidural space at L3 resulting in [address], LATERAL RECESSES AND THE RIGHT NEURAL FORAMINA. 4. Large right paraspinal mass is contiguous with the bony metastatic lesions and extends from the level of L2 to L5-S1. The mass exerts mass effect on the psoas, which is laterally displaced.   Areas of cystic degeneration are seen right paraspinal mass at the level of L4 and L5. 5. Partially included in the field of view of this study is a small metastatic lesion in the left iliac bone    Ct Abdomen Pelvis W Iv Contrast Additional Contrast? None    Result Date: 12/12/2020  Hypodense lesions in the right lobe of the liver, the larger measuring 3.5 cm in diameter and was consistent with metastatic disease. Mildly heterogeneous mass extending cephalad from the head of the pancreas, most suspicious for pancreatic malignancy measuring approximately 2.4 x 3 x 3.2 cm in diameter. Multiple lytic lesions as described involving the T8 vertebral body, T12 vertebral body, L3 vertebral body, and proximal left femur. Pathologic compression fracture of L3 and extensive extension of malignant process to the paraspinal soft tissues anteriorly and to the right of L3, to the adjacent intervertebral discs, and spinal canal.  Extension into the right pedicle and right articular facet and large component extending to the right iliopsoas muscle which is asymmetrically enlarged. Hypodense process extending caudally along the course of the right psoas muscle with mild peripheral enhancement, likely malignant involvement versus associated abscess, felt to be a less likely consideration. Intraspinal extension of malignant process at the level of T8, and L3. Ct Needle Biopsy Liver Percutaneous    Result Date: 12/16/2020  Successful uncomplicated CT and fluoroscopic guided liver mass biopsy. If there are any physician concerns regarding this report, a Radiologist can be reached by calling the following number [phone]. Xr Eye Foreign Body    Result Date: 12/13/2020  No evidence of metallic foreign body within the orbits. Nm Bone Scan Whole Body    Result Date: 12/14/2020  Findings compatible with degenerative changes Multiple foci of abnormal tracer uptake, consistent widespread metastatic disease. Mri Brain W Wo Contrast    Result Date: 12/14/2020  1. There is a small extra-axial enhancing mass, which appears to span the right and left aspects of the mid falx. This measures up to 16 mm. No significant adjacent parenchymal edema is seen. This may represent a meningioma.   However, given history of malignancy, suggest a short-term follow-up examination in 3 months to evaluate for stability. 2. Otherwise, no acute intracranial abnormality. No acute infarct. 3. Mild global parenchymal volume loss with mild-to-moderate chronic microvascular ischemic changes. ASSESSMENT:    Principal Problem:    Lung mass  Active Problems:    Pathologic lumbar vertebral fracture, initial encounter    Malignant neoplasm metastatic to bone [facility])    Metastatic cancer to liver [facility])    Lung cancer metastatic to bone [facility])  Resolved Problems:    * No resolved hospital problems. *       PLAN:    Palliative services is managing his pain  Await further testing reports to decide about treatment plan. He is aware of his diagnosis however tells me he will go down fighting  Leukocytosis noted. Potassium again slightly low and will need replenished  December 20, 2020  Appears otherwise okay. Labs showing decreasing leukocytosis. Slightly low on potassium yet again. Appears comfortable. Await all the results and tests and final plan for treatment  Lower Norvasc and hydralazine    December 21, 2020  Blood pressure still on the lower side. DC amlodipine. His Lasix will be decreased from 40 mg to 20 mg. Hydralazine from 50-25. Continue with palliative care. Continue to follow labs.   Appreciate multidisciplinary approach    12/22:  Discharge plan discussed with the patient  Subacute rehab is being you are considered  He is yet to make a decision    12/23:  Needs ANGEL  He is agreeable atleast when I spoke with him  Discussed with     12/24  Palliative medicine managing pain  Liver biopsy and bronchoscopy completed  Morphine PCA  Keep pulse oximetry over 92%  Bone scan shows multiple metastatic lesions  Transition to supportive care    12/25  He is clinically stable  Palliative care  Psychiatry  Placement is pending      Diet: DIET GENERAL;  Dietary Nutrition Supplements: Standard High Calorie Oral Supplement  Code Status: Prior    DVT Prophylaxis:   In place  Recommended disposition at discharge:  unsure yet    +++++++++++++++++++++++++++++++++++++++++++++++++  Adilia Martinez MD   [facility].  +++++++++++++++++++++++++++++++++++++++++++++++++  NOTE: This report was transcribed using voice recognition software. Every effort was made to ensure accuracy; however, inadvertent computerized transcription errors may be present.

## 2020-12-26 NOTE — PROGRESS NOTES
Hospitalist Progress Note      Synopsis: Patient admitted on 12/12/2020    Subjective  Patient admitted on the 12th with severe back pain  For the investigations have resulted in a diagnosis of left lung apex lesion with adenopathy with metastasis to the liver and spine. Also evaluated by neurosurgery with no intervention recommended. Palliative treatment to the spine. Palliative team following as well as pain control being attempted. He is already had bronchoscopy with EBUS. 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 Awaiting PD-L1 results to finalize  Dec 20 2020  Other than constipation he appears to be comfortable. Awaiting his radiation plan  December 21, 2020  Vital remained stable and he remains in a good mood. Starts radiation treatment tomorrow  At his baseline for his alertness and is quite alert. Eating well.    12/22:  Patient returned from radiation oncology  Denies any new complaints  He reports that he is bombarded with information and cannot make up his mind yet    12/23:  Same routine -too much info, can't handle it    12/24  He still has pain but states it is much better  He is moving about the bed  He voices no other complaints    12/25  He is quite comfortable  He is not acknowledging any problem at this point    12/25  He appears quite manic at times  Fortunately he states he is reasonably comfortable although not completely pain-free        Exam:  BP (!) 114/93   Pulse 108   Temp 97.8 °F (36.6 °C) (Temporal)   Resp 18   Ht 5' 9\" (1.753 m)   Wt 195 lb (88.5 kg)   SpO2 93%   BMI 28.80 kg/m²   General appearance: No apparent distress, appears stated age and cooperative. HEENT: Pupils equal, round, and reactive to light. Conjunctivae/corneas clear. Neck: Supple. No jugular venous distention. Trachea midline. Respiratory:  Normal respiratory effort.  Clear to auscultation, bilaterally without Rales/Wheezes/Rhonchi. Cardiovascular: Regular rate and rhythm with normal S1/S2 without murmurs, rubs or gallops. Abdomen: Soft, non-tender, slightly distended distended with normal bowel sounds. Musculoskeletal: No clubbing, cyanosis or edema bilaterally. Brisk capillary refill. 2+ lower extremity pulses (dorsalis pedis).    Skin: Skin appears to be reactive with spots of erythema noted shoulders along the waist and on his back does not seem to be a specific rash  Neurologic: awake, alert and following commands and seems his same irritable self  His speech is rapid frequently interrupted, extremely labile affect    Medications:  Reviewed    Infusion Medications   Scheduled Medications    methadone  10 mg Oral 3 times per day    morphine  4 mg Intravenous Daily    mineral oil  1 enema Rectal Once    sennosides-docusate sodium  2 tablet Oral BID    furosemide  20 mg Oral Daily    hydrALAZINE  25 mg Oral 3 times per day    pantoprazole  40 mg Oral QAM AC    potassium bicarb-citric acid  20 mEq Oral BID    dexamethasone  4 mg Oral 3 times per day    potassium chloride  40 mEq Oral Once    lidocaine PF  5 mL Nebulization Once    sodium chloride flush  10 mL Intravenous 2 times per day    gabapentin  400 mg Oral TID    carvedilol  25 mg Oral BID WC    folic acid  1 mg Oral Daily    mirtazapine  15 mg Oral Nightly    enoxaparin  40 mg Subcutaneous Daily     PRN Meds: polyethylene glycol, oxyCODONE **OR** oxyCODONE, sodium chloride flush, LORazepam, LORazepam, gadobenate dimeglumine, bisacodyl, acetaminophen, sodium chloride flush    I/O    Intake/Output Summary (Last 24 hours) at 12/26/2020 0915  Last data filed at 12/26/2020 0651  Gross per 24 hour   Intake 120 ml   Output 600 ml   Net -480 ml       Labs:   Recent Labs     12/24/20  0558 12/25/20  0504 12/26/20  0643   WBC 25.1* 28.2* 27.4*   HGB 12.7 12.5 12.0*   HCT 36.9* 36.8* 35.1*    290 285       Recent Labs     12/24/20  0558 12/25/20  0504 12/26/20  0643    136 133   K 4.2 4.5 4.2   CL 95* 92* 92*   CO2 29 32* 28   BUN 33* 30* 32*   CREATININE 0.9 1.0 0.8   CALCIUM 10.2 10.2 9.6       Recent Labs     12/24/20  0558 12/25/20  0504 12/26/20  0643   PROT 6.6 6.7 6.4   ALKPHOS 64 65 68   ALT 49* 54* 153*   AST 40* 36 102*   BILITOT 0.7 0.7 0.5       No results for input(s): INR in the last 72 hours. No results for input(s): Onetha Traci in the last 72 hours. Chronic labs:  Lab Results   Component Value Date    INR 1.1 12/14/2020       Radiology:  Xr Femur Left (min 2 Views)    Result Date: 12/13/2020  1. Lytic lesion in the left femoral neck. No additional evidence of osseous metastasis on this exam.  No evidence of pathologic fracture. 2.  Mild left hip joint osteoarthritis. Ct Chest W Contrast    Result Date: 12/12/2020  Large heterogeneous left hilar mass as described consistent with malignancy. Occlusion of left upper lobe bronchus and severe bronchial narrowing to the left lower lobe. Mildly spiculated nodular lesion, pleural based in the left lung apex likely satellite malignant lesion measuring 1.5 cm. Mediastinal adenopathy likely manifesting malignant involvement. Large metastatic lesion to the T8 vertebral body with paraspinal an intraspinal extension of malignancy and pathologic compression fracture of T8. Ct Lumbar Spine Wo Contrast    Result Date: 12/12/2020  1. Lytic destructive lesion along the right lateral aspect of the L3 vertebral body, involving the right pedicle, transverse process and superior articular facet. 2. Pathological fracture on the right with loss of height by approximately 60%. The lesion may represent malignancy or an infectious process. Destructive changes in the discs characteristic of discitis/osteomyelitis are not evident on this study. Further evaluation with pre and post contrast enhanced MRI is recommended.  3. Soft tissue fullness extends from the L3 vertebral body into the adjacent psoas muscle, which is landis than the contralateral side. The soft tissue component may represent malignancy or infection. 4. Extent of epidural involvement of disease at L3 is not well delineated on this study. Pre and post contrast enhanced MRI should clarify. 5. Variable degrees of degenerative changes, as described. Ct Guided Needle Placement    Result Date: 12/16/2020  Successful uncomplicated CT and fluoroscopic guided liver mass biopsy. If there are any physician concerns regarding this report, a Radiologist can be reached by calling the following number [phone]. Mri Thoracic Spine W Wo Contrast    Result Date: 12/13/2020  1. Metastatic lesions in the T8 and T12 vertebral bodies, more prominent in the former. 2. Pathological fracture of the T8 vertebral body with approximately 40% loss of height. 3. Epidural extension of tumor along the left lateral aspect at T8, extending into the left T8-9 neural foramina. 4. Moderate central canal stenosis at T8. 5. No cord lesion or cord edema seen. Mri Lumbar Spine W Wo Contrast    Result Date: 12/13/2020  1. Metastatic lesion in the L3 vertebral body, extending into the right pedicle and superior articular facet. Pathological fracture of L3, with approximately 60% loss of height. 2. Metastatic infiltration along the endplates L3 and L4. No pathological fracture seen at these levels. 3. Infiltration of tumor into the epidural space at L3 resulting in [address], LATERAL RECESSES AND THE RIGHT NEURAL FORAMINA. 4. Large right paraspinal mass is contiguous with the bony metastatic lesions and extends from the level of L2 to L5-S1. The mass exerts mass effect on the psoas, which is laterally displaced.   Areas of cystic degeneration are seen right paraspinal mass at the level of L4 and L5. 5. Partially included in the field of view of this study is a small metastatic lesion in the left iliac bone    Ct Abdomen Pelvis W Iv Contrast Additional Contrast? None    Result Date: 12/12/2020  Hypodense lesions in the right lobe of the liver, the larger measuring 3.5 cm in diameter and was consistent with metastatic disease. Mildly heterogeneous mass extending cephalad from the head of the pancreas, most suspicious for pancreatic malignancy measuring approximately 2.4 x 3 x 3.2 cm in diameter. Multiple lytic lesions as described involving the T8 vertebral body, T12 vertebral body, L3 vertebral body, and proximal left femur. Pathologic compression fracture of L3 and extensive extension of malignant process to the paraspinal soft tissues anteriorly and to the right of L3, to the adjacent intervertebral discs, and spinal canal.  Extension into the right pedicle and right articular facet and large component extending to the right iliopsoas muscle which is asymmetrically enlarged. Hypodense process extending caudally along the course of the right psoas muscle with mild peripheral enhancement, likely malignant involvement versus associated abscess, felt to be a less likely consideration. Intraspinal extension of malignant process at the level of T8, and L3. Ct Needle Biopsy Liver Percutaneous    Result Date: 12/16/2020  Successful uncomplicated CT and fluoroscopic guided liver mass biopsy. If there are any physician concerns regarding this report, a Radiologist can be reached by calling the following number [phone]. Xr Eye Foreign Body    Result Date: 12/13/2020  No evidence of metallic foreign body within the orbits. Nm Bone Scan Whole Body    Result Date: 12/14/2020  Findings compatible with degenerative changes Multiple foci of abnormal tracer uptake, consistent widespread metastatic disease. Mri Brain W Wo Contrast    Result Date: 12/14/2020  1. There is a small extra-axial enhancing mass, which appears to span the right and left aspects of the mid falx. This measures up to 16 mm.   No significant adjacent parenchymal edema is seen. This may represent a meningioma. However, given history of malignancy, suggest a short-term follow-up examination in 3 months to evaluate for stability. 2. Otherwise, no acute intracranial abnormality. No acute infarct. 3. Mild global parenchymal volume loss with mild-to-moderate chronic microvascular ischemic changes. ASSESSMENT:    Principal Problem:    Lung mass  Active Problems:    Pathologic lumbar vertebral fracture, initial encounter    Malignant neoplasm metastatic to bone [facility])    Metastatic cancer to liver [facility])    Lung cancer metastatic to bone [facility])  Resolved Problems:    * No resolved hospital problems. *       PLAN:    Palliative services is managing his pain  Await further testing reports to decide about treatment plan. He is aware of his diagnosis however tells me he will go down fighting  Leukocytosis noted. Potassium again slightly low and will need replenished  December 20, 2020  Appears otherwise okay. Labs showing decreasing leukocytosis. Slightly low on potassium yet again. Appears comfortable. Await all the results and tests and final plan for treatment  Lower Norvasc and hydralazine    December 21, 2020  Blood pressure still on the lower side. DC amlodipine. His Lasix will be decreased from 40 mg to 20 mg. Hydralazine from 50-25. Continue with palliative care. Continue to follow labs.   Appreciate multidisciplinary approach    12/22:  Discharge plan discussed with the patient  Subacute rehab is being you are considered  He is yet to make a decision    12/23:  Needs ANGEL  He is agreeable atleast when I spoke with him  Discussed with     12/24  Palliative medicine managing pain  Liver biopsy and bronchoscopy completed  Morphine PCA  Keep pulse oximetry over 92%  Bone scan shows multiple metastatic lesions  Transition to supportive care    12/25  He is clinically stable  Palliative care  Psychiatry  Placement is pending    12/26  Reviewed palliative care again  Again awaiting placement      Diet: DIET GENERAL;  Dietary Nutrition Supplements: Standard High Calorie Oral Supplement  Code Status: DNR-CCA    DVT Prophylaxis:   In place  Recommended disposition at discharge:  unsure yet    +++++++++++++++++++++++++++++++++++++++++++++++++  Jarret Larios MD   [facility].  +++++++++++++++++++++++++++++++++++++++++++++++++  NOTE: This report was transcribed using voice recognition software. Every effort was made to ensure accuracy; however, inadvertent computerized transcription errors may be present.

## 2020-12-26 NOTE — PROGRESS NOTES
Inpatient Medical Oncology Progress Note    Subjective:  No fever. Receiving RT. Pain controlled. Palliative following    Objective:  BP (!) 114/93   Pulse 108   Temp 97.8 °F (36.6 °C) (Temporal)   Resp 18   Ht 5' 9\" (1.753 m)   Wt 195 lb (88.5 kg)   SpO2 93%   BMI 28.80 kg/m²   GENERAL: alert oriented x 3 not in distress  HEENT: PERRLA; EOMI. Oropharynx clear. NECK: Supple. No palpable lymphadenopathy. LUNGS: Fair air entry bilaterally  CARDIOVASCULAR: RRR. No murmurs. ABDOMEN: Soft. Non-tender, non-distended. Positive bowel sounds. EXTREMITIES: Without clubbing, cyanosis, or edema. NEUROLOGIC: No focal deficits.    ECOG PS 1-2    Diagnostics:  Lab Results   Component Value Date    WBC 27.4 (H) 12/26/2020    HGB 12.0 (L) 12/26/2020    HCT 35.1 (L) 12/26/2020    MCV 93.9 12/26/2020     12/26/2020     Lab Results   Component Value Date     12/26/2020    K 4.2 12/26/2020    CL 92 (L) 12/26/2020    CO2 28 12/26/2020    BUN 32 (H) 12/26/2020    CREATININE 0.8 12/26/2020    GLUCOSE 136 (H) 12/26/2020    CALCIUM 9.6 12/26/2020    PROT 6.4 12/26/2020    LABALBU 3.1 (L) 12/26/2020    BILITOT 0.5 12/26/2020    ALKPHOS 68 12/26/2020     (H) 12/26/2020     (H) 12/26/2020    LABGLOM >60 12/26/2020    GFRAA >60 12/26/2020     Lab Results   Component Value Date    .7 (H) 12/14/2020      123 on 12/14/2020  Chromogranin A 161 on 12/14/2020    Impression/Plan:  61-year-old male patient, who presented with worsening back pain, found to have metastatic disease, large heterogeneous left hilar mass measuring about 5.4 cm, with occlusion of the left upper lobe bronchus with severe bronchial narrowing to the left lower lobe, left lung apex lesion measuring 1.5 cm, mediastinal adenopathy, metastasis to the liver, possibly the pancreas and the spine  MRI Thoracic spine had revealed metastatic lesions in the T8 and T12 vertebral bodies, pathologic fracture of the T8 vertebral body with approximately 40% loss of height, epidural extension of tumor along the left lateral aspect of the T8, extending into the left T8-9 neural foramina, moderate central canal stenosis at T8, no cord lesion or cord edema seen, he also has a destructive lesion along the right lateral aspect of the L3 vertebral body   MRI Lumbar spine revealed metastatic lesion in the L3 vertebral body, extending into the right pedicle and superior sacral facets, lateral fracture of L3 with approximately 60% loss of height, metastatic infiltration along the endplates U9-N1, infiltration of tumor into the epidural space at L3 resulting in severe stenosis of the central canal lateral recess on the right neural foramina: Mild right paraspinal mass contiguous with the venous occlusions and extending from the level of L2-L5 S1, the mass exerts mass-effect on the sulcus which is laterally displaced. Small metastatic lesion in the left iliac bone. The patient has a probable lung cancer, he has a widely metastatic disease. MRI Brain revealed a small extra-axial enhancing mass, measuring up 16mm, this may represent a meningioma, however given her history of malignancy short-term follow-up in 3 months was recommended. Bone scan revealed widespread metastatic disease to the bones. Evaluated by neurosurgery, no surgical interventions recommended. Rad Onc for palliative RT to the spine. Currently receiving RT  Pain Control. Palliative medicine team on board. S/P liver biopsy 12/14/2020  Bronchoscopy/EBUS 12/16/2020 by Dr. Willam Norman from pulmonary team.   Findings included Large endobronchial mass completely obstructing the distal left mainstem  Pathology from the left endobronchial mass and liver are consistent with non-small cell lung cancer, squamous cell carcinoma with basaloid features. In conjunction with the clinical history of a lung mass, the tumor seen in the liver most likely represents metastasis from a lung primary.   PD-L1 pending. Await PD-L1 results to formulate the final treatment plan, NGS (foundation 1 testing) ordered. Continue RT at this time. Palliative care following.    Awaiting placement    12/26/2020  Mariana Gonzalez MD

## 2020-12-26 NOTE — PROGRESS NOTES
Palliative Care Department  [phone]  Palliative Care Progress Note  Provider Mickey Michaud  [id number]  Hospital Day: 15    Referring Provider: Vijaya Smith MD  Palliative Medicine was consulted for assistance with: Symptom management, goals of care, CODE STATUS and family support    Brief summary:  Janine Meckel is a 44 y.o. who presented to the hospital with chief complaints of back pain and was admitted 12/12/2020 with diagnosis(ses) of malignant neoplasm metastatic to bone, lung mass, pathologic fracture of vertebrae due to neoplastic disease. Patient has started workup for malignancy. ASSESSMENT/PLAN:   Pertinent hospital diagnoses:  1. Metastatic non-small cell lung cancer, squamous type   -Liver and lung biopsy positive for malignancy, pathology and cytology reviewed  -Radiation oncology following  - MRI brain small extra-axial enhancing mass- meningioma vs metastasis  - bone scan shows widespread metastatic disease     2.  Pain associated with neoplasm  - oxycodone change to 15-20mg q4 hrs prn- has had 80mg in past 24 hours  - increased methadone to 10mg q8 hours, will get EKG  - protonix IV prophylaxis  -  EKG screening normal QTc, ok for methadone  - continue neurontin to 400mg TID  -  IV morphine 4 mg to receive 15 to 30 minutes before leaving for radiation     3. GI-constipation resolved  - senna-s 2 tabs bid  - miralax 17 gm daily PRN     4.  Spinal cord stenosis secondary to tumor infiltration  -Receiving steroids  -Significant concerns with cord compression and worsening neurologic status, on radiation ASAP per Dr. Rodolfo Ceballos. PALLIATIVE CARE ENCOUNTER  - Capacity: At this time, Janine Meckel, Does have capacity for medical decision-making.   Capacity is time limited and situation/question specific  - Outcome of goals of care meeting: Consult to psych, provide advanced directives  - Code Status:   full  - Advanced directives: None  - Surrogate decision maker/Legal NOK:     Contacts:    Payal Montes (mother) [phone]   Néstor Taylor [phone]  - Spiritual assessment: none  - Bereavement and grief: diagnosis and young age    - DISPO: workup for malignancy, pain control, ? Facility    Referrals to: none today    Subjective   Chart reviewed  Discussed with RN    Patient has better bed mobility without pain today, but agitated. States he is \"worked up\", frustrated with his cell phone, believes the hospital listens to his conversation if he uses the hospital phone. Also states he has been very upset since yesterday about learning that his cancer is so widespread. Inpatient medications reviewed: yes  Home Medications reviewed: yes    OBJECTIVE:   Prognosis: Poor    Physical Exam:  BP (!) 129/94   Pulse 108   Temp 97.4 °F (36.3 °C) (Infrared)   Resp 18   Ht 5' 9\" (1.753 m)   Wt 195 lb (88.5 kg)   SpO2 94%   BMI 28.80 kg/m²   Gen:  Appears stated age, rolling around in the bed  HEENT:  Normocephalic, atraumatic, mucosa dry, PERRLA, EOMI, sclera anicteric  Neck:  Supple, trachea midline, no JVD  Lungs:  respirations unlabored, clear to auscultation bilaterally  Heart[de-identified]  normal S1S2, regular rate and rhythm  Abd:  Soft, non tender, non distended, bowel sounds present  :  No rogers  Ext:  no edema, weakness bilateral hip flexor  Skin:   Right arm and bilateral flank erythema ,scar under left scapula  Neuro:  Alert, remains    Objective data reviewed: labs, images, records, medication use, vitals and chart    Time/Communication  Greater than 50% of time spent, total 25 minutes in counseling and coordination of care at the bedside/over the telephone regarding goals of care, symptom management, diagnosis and prognosis and see above. Thank you for allowing Palliative Medicine to participate in the care of Stacie Guerrero.       Provider [facility]

## 2020-12-26 NOTE — PLAN OF CARE
Problem: Injury - Risk of, Physical Injury:  Goal: Will remain free from falls  Description: Will remain free from falls  Outcome: Met This Shift

## 2020-12-26 NOTE — PROGRESS NOTES
Vandana Eduardo M.D.,[facility]  Joel Reis D.O., F.A.C.OEmeritaI., Mónica Lemons M.D. Gil Taylor M.D., Lynn Cuba M.D. Astrid Chester D.O. Daily Pulmonary Progress Note    Patient:  Tyson Muñoz 44 y.o. male MRN: [id number]     Date of Service: 12/26/2020      Synopsis     We are following patient for lung mass with mets to liver and bone    \"CC\" back pain    Code status:prior      Subjective   Personally seen and examined. Complaining of anterior left lower rib discomfort. No chest pain. Monitor off oxygen. No cough or dyspnea. He is very easily agitated and suffers from ADHD. Review of Systems:  Constitutional: Denies fever, weight loss, night sweats, and fatigue  Skin: Denies pigmentation, dark lesions, and rashes   HEENT: Denies hearing loss, tinnitus, ear drainage, epistaxis, sore throat, and hoarseness. Cardiovascular: Denies palpitations, chest pain, and chest pressure. Respiratory: Denies cough, dyspnea at rest and with any to believe, hemoptysis, apnea, and choking.   Gastrointestinal: Denies nausea, vomiting, poor appetite, diarrhea, heartburn or reflux  Genitourinary: Denies dysuria, frequency, urgency or hematuria  Musculoskeletal: Denies myalgias, muscle weakness, and +bone pain, +back pain     24-hour events:  Pain control    Objective   Vitals: BP (!) 119/58   Pulse 101   Temp 98 °F (36.7 °C) (Temporal)   Resp 18   Ht 5' 9\" (1.753 m)   Wt 195 lb (88.5 kg)   SpO2 95%   BMI 28.80 kg/m²     I/O:    Intake/Output Summary (Last 24 hours) at 12/26/2020 1544  Last data filed at 12/26/2020 0651  Gross per 24 hour   Intake 120 ml   Output 600 ml   Net -480 ml       Vent Information  FiO2 : 2 %  SpO2: 95 %  SpO2/FiO2 ratio: 4750                CURRENT MEDS :  Scheduled Meds:   methadone  10 mg Oral 3 times per day    morphine  4 mg Intravenous Daily    mineral oil  1 enema Rectal Once    sennosides-docusate sodium  2 tablet Oral BID    furosemide 20 mg Oral Daily    hydrALAZINE  25 mg Oral 3 times per day    pantoprazole  40 mg Oral QAM AC    potassium bicarb-citric acid  20 mEq Oral BID    dexamethasone  4 mg Oral 3 times per day    potassium chloride  40 mEq Oral Once    lidocaine PF  5 mL Nebulization Once    sodium chloride flush  10 mL Intravenous 2 times per day    gabapentin  400 mg Oral TID    carvedilol  25 mg Oral BID WC    folic acid  1 mg Oral Daily    mirtazapine  15 mg Oral Nightly    enoxaparin  40 mg Subcutaneous Daily       Physical Exam:  General Appearance: appears comfortable in no acute distress. HEENT: Normocephalic atraumatic without obvious abnormality   Neck: Lips, mucosa, and tongue normal.  Supple, symmetrical, trachea midline, no adenopathy;thyroid:  no enlargement/tenderness/nodules or JVD. Lung: Breath sounds CTA. Respirations   unlabored. Symmetrical expansion. Heart: RRR, normal S1, S2. No MRG  Abdomen: Soft, NT, ND. BS present x 4 quadrants. No bruit or organomegaly. Extremities: Pedal pulses 2+ symmetric b/l. Extremities normal, no cyanosis, clubbing, or edema. Musculokeletal: No joint swelling, no muscle tenderness. ROM normal in all joints of extremities. Neurologic: Mental status: Alert and Oriented X3 .     Pertinent/ New Labs and Imaging Studies     Imaging Personally Reviewed: No new imaging    Labs:  Lab Results   Component Value Date    WBC 27.4 12/26/2020    HGB 12.0 12/26/2020    HCT 35.1 12/26/2020    MCV 93.9 12/26/2020    MCH 32.1 12/26/2020    MCHC 34.2 12/26/2020    RDW 13.2 12/26/2020     12/26/2020    MPV 9.6 12/26/2020     Lab Results   Component Value Date     12/26/2020    K 4.2 12/26/2020    CL 92 12/26/2020    CO2 28 12/26/2020    BUN 32 12/26/2020    CREATININE 0.8 12/26/2020    LABALBU 3.1 12/26/2020    CALCIUM 9.6 12/26/2020    GFRAA >60 12/26/2020    LABGLOM >60 12/26/2020     Lab Results   Component Value Date    PROTIME 12.7 12/14/2020    INR 1.1 12/14/2020 No results for input(s): PROBNP in the last 72 hours. No results for input(s): PROCAL in the last 72 hours. This SmartLink has not been configured with any valid records. Micro:  No results for input(s): CULTRESP in the last 72 hours. No results for input(s): LABGRAM in the last 72 hours. No results for input(s): LEGUR in the last 72 hours. No results for input(s): STREPNEUMAGU in the last 72 hours. No results for input(s): LP1UAG in the last 72 hours. 12/14/2020 REASON FOR REVISION: Pathology liver biopsy  This report is revised by QST on 12/21/2020 in order to correct a type in   the Comment \"nonjunction\" to read \"conjunction\" due to voice   transcription recognition error. The FINAL DIAGNOSIS remains UNCHANGED. Originally reported on 12-18-20     Diagnosis:   Liver, tumor, core needle biopsy:        - Metastatic poorly differentiated non-small cell carcinoma, see   comment. Comment:   Sections reveal the liver tissue cores to be completely or   partially replaced by nests of malignant-appearing epithelial cells   showing vague squamoid differentiation with peripheral palisading. Immunostaining for CDX2, CK 5/6, CK7, CK20, p40 and TTF-1 was performed   on sections of one tissue block (A1).  The tumor cells show diffuse and   strong positivity for CK 5/6, CK7 and p40, focal weak positivity for   TTF-1 and negativity for CDX2 and CK20.  The staining results are   positive the diagnosis of a poorly differentiated non-small cell   carcinoma with squamous differentiation. In conjunction with the clinical history of a lung mass, the tumor seen   in the liver most likely represents metastasis from a lung primary. Intradepartmental consultation is obtained. 12/16/2020 bronchoscopy EBUS  Diagnosis:   Lung, left endobronchial mass, biopsy:   Squamous cell carcinoma with basaloid features, see comment.      Comment:   Immunostains stain the carcinoma cells in block A1 as follows:   P40: Positive   TTF-1: Negative     As per institution protocol, PD-L1 testing has been ordered on block A1,   and results will be issued separately. Intradepartmental consultation is obtained. Assessment:    1. Lung cancer with metastasis to thoracis and lumbar spine,2.4x3x3.2 cm diameter  pancreas, and liver, brain   2. 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 Polysubstance abuse , hx of overdose 2017  4. Major depressive disorder   5. Nicotine dependence   6. Liver biopsy 12/14/2020, bronchoscopy with EBUS 12/16/2020      Plan:   1. Monitor off oxygen  2. Radiation per radiation oncology. Oncology team following-Bone scan with multiple widespread metastatic lesions. Await PD-L1 results to formulate a final treatment plan  3. IR liver biopsy 12/14 await path, s/p 12/16 bronchoscopy with Large Heterogeneous vascular endobronchial lesion obstructing the left distal mainstem  4. Chronic back pain. Neurosurgery following-back brace ordered  5. DVT, GI prophylaxis  6. Palliative medicine following for symptom management and goals of care  This plan of care was reviewed in collaboration with Dr. Lino Hernandez  Electronically signed by STEVE Henderson - CNP on 12/26/2020 at 3:48 PM     I personally saw, examined, and cared for the patient. Labs, medications, radiographs reviewed. I agree with history exam and plans detailed in NP note.   Po Francis MD

## 2020-12-27 NOTE — PLAN OF CARE
Problem: Pain:  Goal: Pain level will decrease  Description: Pain level will decrease  Outcome: Met This Shift     Problem: Falls - Risk of:  Goal: Will remain free from falls  Description: Will remain free from falls  Outcome: Met This Shift     Problem: Falls - Risk of:  Goal: Absence of physical injury  Description: Absence of physical injury  Outcome: Met This Shift     Problem: Daily Care:  Goal: Daily care needs are met  Description: Daily care needs are met  Outcome: Met This Shift     Problem: Injury - Risk of, Physical Injury:  Goal: Will remain free from falls  Description: Will remain free from falls  Outcome: Met This Shift     Problem: Injury - Risk of, Physical Injury:  Goal: Absence of physical injury  Description: Absence of physical injury  Outcome: Met This Shift

## 2020-12-27 NOTE — PROGRESS NOTES
gadobenate dimeglumine, bisacodyl, acetaminophen, sodium chloride flush    I/O    Intake/Output Summary (Last 24 hours) at 12/27/2020 0906  Last data filed at 12/26/2020 2216  Gross per 24 hour   Intake 960 ml   Output --   Net 960 ml       Labs:   Recent Labs     12/25/20  0504 12/26/20  0643   WBC 28.2* 27.4*   HGB 12.5 12.0*   HCT 36.8* 35.1*    285       Recent Labs     12/25/20  0504 12/26/20  0643    133   K 4.5 4.2   CL 92* 92*   CO2 32* 28   BUN 30* 32*   CREATININE 1.0 0.8   CALCIUM 10.2 9.6       Recent Labs     12/25/20  0504 12/26/20  0643   PROT 6.7 6.4   ALKPHOS 65 68   ALT 54* 153*   AST 36 102*   BILITOT 0.7 0.5       No results for input(s): INR in the last 72 hours. No results for input(s): Tejal Jeddo in the last 72 hours. Chronic labs:  Lab Results   Component Value Date    INR 1.1 12/14/2020       Radiology:  Xr Femur Left (min 2 Views)    Result Date: 12/13/2020  1. Lytic lesion in the left femoral neck. No additional evidence of osseous metastasis on this exam.  No evidence of pathologic fracture. 2.  Mild left hip joint osteoarthritis. Ct Chest W Contrast    Result Date: 12/12/2020  Large heterogeneous left hilar mass as described consistent with malignancy. Occlusion of left upper lobe bronchus and severe bronchial narrowing to the left lower lobe. Mildly spiculated nodular lesion, pleural based in the left lung apex likely satellite malignant lesion measuring 1.5 cm. Mediastinal adenopathy likely manifesting malignant involvement. Large metastatic lesion to the T8 vertebral body with paraspinal an intraspinal extension of malignancy and pathologic compression fracture of T8. Ct Lumbar Spine Wo Contrast    Result Date: 12/12/2020  1. Lytic destructive lesion along the right lateral aspect of the L3 vertebral body, involving the right pedicle, transverse process and superior articular facet.  2. Pathological fracture on the right with loss of height by approximately 60%. The lesion may represent malignancy or an infectious process. Destructive changes in the discs characteristic of discitis/osteomyelitis are not evident on this study. Further evaluation with pre and post contrast enhanced MRI is recommended. 3. Soft tissue fullness extends from the L3 vertebral body into the adjacent psoas muscle, which is landis than the contralateral side. The soft tissue component may represent malignancy or infection. 4. Extent of epidural involvement of disease at L3 is not well delineated on this study. Pre and post contrast enhanced MRI should clarify. 5. Variable degrees of degenerative changes, as described. Ct Guided Needle Placement    Result Date: 12/16/2020  Successful uncomplicated CT and fluoroscopic guided liver mass biopsy. If there are any physician concerns regarding this report, a Radiologist can be reached by calling the following number [phone]. Mri Thoracic Spine W Wo Contrast    Result Date: 12/13/2020  1. Metastatic lesions in the T8 and T12 vertebral bodies, more prominent in the former. 2. Pathological fracture of the T8 vertebral body with approximately 40% loss of height. 3. Epidural extension of tumor along the left lateral aspect at T8, extending into the left T8-9 neural foramina. 4. Moderate central canal stenosis at T8. 5. No cord lesion or cord edema seen. Mri Lumbar Spine W Wo Contrast    Result Date: 12/13/2020  1. Metastatic lesion in the L3 vertebral body, extending into the right pedicle and superior articular facet. Pathological fracture of L3, with approximately 60% loss of height. 2. Metastatic infiltration along the endplates L3 and L4. No pathological fracture seen at these levels. 3. Infiltration of tumor into the epidural space at L3 resulting in [address], LATERAL RECESSES AND THE RIGHT NEURAL FORAMINA.  4. Large right paraspinal mass is contiguous with the bony metastatic lesions and extends from the level of L2 to L5-S1. The mass exerts mass effect on the psoas, which is laterally displaced. Areas of cystic degeneration are seen right paraspinal mass at the level of L4 and L5. 5. Partially included in the field of view of this study is a small metastatic lesion in the left iliac bone    Ct Abdomen Pelvis W Iv Contrast Additional Contrast? None    Result Date: 12/12/2020  Hypodense lesions in the right lobe of the liver, the larger measuring 3.5 cm in diameter and was consistent with metastatic disease. Mildly heterogeneous mass extending cephalad from the head of the pancreas, most suspicious for pancreatic malignancy measuring approximately 2.4 x 3 x 3.2 cm in diameter. Multiple lytic lesions as described involving the T8 vertebral body, T12 vertebral body, L3 vertebral body, and proximal left femur. Pathologic compression fracture of L3 and extensive extension of malignant process to the paraspinal soft tissues anteriorly and to the right of L3, to the adjacent intervertebral discs, and spinal canal.  Extension into the right pedicle and right articular facet and large component extending to the right iliopsoas muscle which is asymmetrically enlarged. Hypodense process extending caudally along the course of the right psoas muscle with mild peripheral enhancement, likely malignant involvement versus associated abscess, felt to be a less likely consideration. Intraspinal extension of malignant process at the level of T8, and L3. Ct Needle Biopsy Liver Percutaneous    Result Date: 12/16/2020  Successful uncomplicated CT and fluoroscopic guided liver mass biopsy. If there are any physician concerns regarding this report, a Radiologist can be reached by calling the following number [phone]. Xr Eye Foreign Body    Result Date: 12/13/2020  No evidence of metallic foreign body within the orbits.     Nm Bone Scan Whole Body    Result Date: 12/14/2020  Findings compatible with degenerative changes Multiple foci of abnormal tracer uptake, consistent widespread metastatic disease. Mri Brain W Wo Contrast    Result Date: 12/14/2020  1. There is a small extra-axial enhancing mass, which appears to span the right and left aspects of the mid falx. This measures up to 16 mm. No significant adjacent parenchymal edema is seen. This may represent a meningioma. However, given history of malignancy, suggest a short-term follow-up examination in 3 months to evaluate for stability. 2. Otherwise, no acute intracranial abnormality. No acute infarct. 3. Mild global parenchymal volume loss with mild-to-moderate chronic microvascular ischemic changes. ASSESSMENT:    Principal Problem:    Lung mass  Active Problems:    Pathologic lumbar vertebral fracture, initial encounter    Malignant neoplasm metastatic to bone [facility])    Metastatic cancer to liver [facility])    Lung cancer metastatic to bone [facility])  Resolved Problems:    * No resolved hospital problems. *       PLAN:    Palliative services is managing his pain  Await further testing reports to decide about treatment plan. He is aware of his diagnosis however tells me he will go down fighting  Leukocytosis noted. Potassium again slightly low and will need replenished  December 20, 2020  Appears otherwise okay. Labs showing decreasing leukocytosis. Slightly low on potassium yet again. Appears comfortable. Await all the results and tests and final plan for treatment  Lower Norvasc and hydralazine    December 21, 2020  Blood pressure still on the lower side. DC amlodipine. His Lasix will be decreased from 40 mg to 20 mg. Hydralazine from 50-25. Continue with palliative care. Continue to follow labs.   Appreciate multidisciplinary approach    12/22:  Discharge plan discussed with the patient  Subacute rehab is being you are considered  He is yet to make a decision    12/23:  Needs ANGEL  He is agreeable atleast when I spoke with him  Discussed with

## 2020-12-28 NOTE — PROGRESS NOTES
Date: 2020       Patient Name: Trinity Blanca  : 1970      MRN: [id number]    Patient adamantly declining to wear Soft TLSO for PT. Pt educated on importance of adhering to spinal precautions.  .   Will follow up as appropriate    Toshia Isabel PT, DPT  [id number]

## 2020-12-28 NOTE — PROGRESS NOTES
Occupational Therapy  OT BEDSIDE TREATMENT NOTE      Date:2020  Patient Name: Kuldeep Aguayo  MRN: [id number]  : 1970  Room: [address] [place] [address]A       Pt declining donning gown and TLSO brace at this time. Will attempt at a later time/date.       Bette Shetty

## 2020-12-28 NOTE — PROGRESS NOTES
Patient arrived to receive his radiation therapy-he was in a great deal of pain-thrashing, unable to lie still-patient states he needs to have a bowel movement-RN and therapist spent a great deal of time helping patient on bed pan without results. Patient fades in and out-states he is able to hear everything that the RN is saying but speech is slurred and difficult to understand at times. Therapists are unable to treat related to thrashing-patient must be able to lie flat on the table for a 15 minute timeframe to receive his radiation treatment. Patient is followed by Palliative Med with IV morphine prescribed prior to radiation. Patient currently without IV access at time of treatment.

## 2020-12-28 NOTE — PROGRESS NOTES
Inpatient Medical Oncology Progress Note    Subjective:  No fever. Pain controlled. Palliative following    Objective:  BP 88/61   Pulse 87   Temp 98 °F (36.7 °C) (Temporal)   Resp 16   Ht 5' 9\" (1.753 m)   Wt 195 lb (88.5 kg)   SpO2 99%   BMI 28.80 kg/m²   GENERAL: alert oriented x 3 not in distress  HEENT: PERRLA; EOMI. Oropharynx clear. NECK: Supple. No palpable lymphadenopathy. LUNGS: Fair air entry bilaterally  CARDIOVASCULAR: RRR. No murmurs. ABDOMEN: Soft. Non-tender, non-distended. Positive bowel sounds. EXTREMITIES: Without clubbing, cyanosis, or edema. NEUROLOGIC: No focal deficits.    ECOG PS 1    Diagnostics:  Lab Results   Component Value Date    WBC 14.5 (H) 12/28/2020    HGB 12.2 (L) 12/28/2020    HCT 36.1 (L) 12/28/2020    MCV 94.0 12/28/2020     12/28/2020     Lab Results   Component Value Date     12/28/2020    K 4.4 12/28/2020    CL 96 (L) 12/28/2020    CO2 32 (H) 12/28/2020    BUN 29 (H) 12/28/2020    CREATININE 0.7 12/28/2020    GLUCOSE 158 (H) 12/28/2020    CALCIUM 9.9 12/28/2020    PROT 6.3 (L) 12/28/2020    LABALBU 3.0 (L) 12/28/2020    BILITOT 0.6 12/28/2020    ALKPHOS 73 12/28/2020    AST 68 (H) 12/28/2020     (H) 12/28/2020    LABGLOM >60 12/28/2020    GFRAA >60 12/28/2020     Lab Results   Component Value Date    .7 (H) 12/14/2020      123 on 12/14/2020  Chromogranin A 161 on 12/14/2020    Impression/Plan:  73-year-old male patient, who presented with worsening back pain, found to have metastatic disease, large heterogeneous left hilar mass measuring about 5.4 cm, with occlusion of the left upper lobe bronchus with severe bronchial narrowing to the left lower lobe, left lung apex lesion measuring 1.5 cm, mediastinal adenopathy, metastasis to the liver, possibly the pancreas and the spine  MRI Thoracic spine had revealed metastatic lesions in the T8 and T12 vertebral bodies, pathologic fracture of the T8 vertebral body with approximately 40% loss of height, epidural extension of tumor along the left lateral aspect of the T8, extending into the left T8-9 neural foramina, moderate central canal stenosis at T8, no cord lesion or cord edema seen, he also has a destructive lesion along the right lateral aspect of the L3 vertebral body   MRI Lumbar spine revealed metastatic lesion in the L3 vertebral body, extending into the right pedicle and superior sacral facets, lateral fracture of L3 with approximately 60% loss of height, metastatic infiltration along the endplates C9-W6, infiltration of tumor into the epidural space at L3 resulting in severe stenosis of the central canal lateral recess on the right neural foramina: Mild right paraspinal mass contiguous with the venous occlusions and extending from the level of L2-L5 S1, the mass exerts mass-effect on the sulcus which is laterally displaced. Small metastatic lesion in the left iliac bone. The patient has a probable lung cancer, he has a widely metastatic disease. MRI Brain revealed a small extra-axial enhancing mass, measuring up 16mm, this may represent a meningioma, however given her history of malignancy short-term follow-up in 3 months was recommended. Bone scan revealed widespread metastatic disease to the bones. Evaluated by neurosurgery, no surgical interventions recommended. Rad Onc for palliative RT to the spine. Currently receiving RT  Pain Control. Palliative medicine team on board. S/P liver biopsy 12/14/2020  Bronchoscopy/EBUS 12/16/2020 by Dr. Guero Batista from pulmonary team.   Findings included Large endobronchial mass completely obstructing the distal left mainstem  Pathology from the left endobronchial mass and liver are consistent with non-small cell lung cancer, squamous cell carcinoma with basaloid features.  In conjunction with the clinical history of a lung mass, the tumor seen in the liver most likely represents metastasis from a lung primary  PD-L1 22C3 FDA Demetrio Brown) analysis: High expression 65%   Will likely recommend Keytruda monotherapy after RT completion given high PD-L1 expression  Continue RT at this time. Palliative care following. Awaiting placement.     12/28/2020  Ethan Hatchet, MD

## 2020-12-28 NOTE — PROGRESS NOTES
Palliative Care Department  [phone]  Palliative Care Progress Note  Provider Mickey Michaud  [id number]  Hospital Day: 16    Referring Provider: Sean Degroot MD  Palliative Medicine was consulted for assistance with: Symptom management, goals of care, CODE STATUS and family support    Brief summary:  Chapo Mahan is a 44 y.o. who presented to the hospital with chief complaints of back pain and was admitted 12/12/2020 with diagnosis(ses) of malignant neoplasm metastatic to bone, lung mass, pathologic fracture of vertebrae due to neoplastic disease. Patient has started workup for malignancy. ASSESSMENT/PLAN:   Pertinent hospital diagnoses:  1. Metastatic non-small cell lung cancer, squamous type   -Liver and lung biopsy positive for malignancy, pathology and cytology reviewed  -Radiation oncology following  - MRI brain small extra-axial enhancing mass- meningioma vs metastasis  - bone scan shows widespread metastatic disease     2.  Pain associated with neoplasm  - oxycodone change to 15-20mg q4 hrs prn- has had 80mg in past 24 hours  - increased methadone to 10mg q8 hours, EKG no QT prolongation  - protonix IV prophylaxis  -  EKG screening normal QTc, ok for methadone  - continue neurontin to 400mg TID  -  IV morphine 4 mg to receive 15 to 30 minutes before leaving for radiation     3. GI-constipation resolved  - senna-s 2 tabs bid  - miralax 17 gm daily PRN     4.  Spinal cord stenosis secondary to tumor infiltration  -Receiving steroids  -Significant concerns with cord compression and worsening neurologic status, on radiation ASAP per Dr. Kathie Almanza. PALLIATIVE CARE ENCOUNTER  - Capacity: At this time, Chapo Mahan, Does have capacity for medical decision-making.   Capacity is time limited and situation/question specific  - Outcome of goals of care meeting: Consult to psych, provide advanced directives  - Code Status:   full  - Advanced directives: None  - Surrogate Mariia.       Provider [facility]

## 2020-12-28 NOTE — PLAN OF CARE
Problem: Pain:  Goal: Pain level will decrease  Description: Pain level will decrease  12/28/2020 1323 by Bard Daron RN  Outcome: Met This Shift  12/28/2020 0209 by Cherelle Lancaster RN  Outcome: Met This Shift     Problem: Falls - Risk of:  Goal: Will remain free from falls  Description: Will remain free from falls  12/28/2020 1323 by Bard Daron RN  Outcome: Met This Shift  12/28/2020 0209 by Cherelle Lancaster RN  Outcome: Met This Shift  Goal: Absence of physical injury  Description: Absence of physical injury  12/28/2020 1323 by Bard Daron RN  Outcome: Met This Shift  12/28/2020 0209 by Cherelle Lancaster RN  Outcome: Met This Shift     Problem: Safety:  Goal: Free from accidental physical injury  Description: Free from accidental physical injury  Outcome: Met This Shift

## 2020-12-29 NOTE — PROGRESS NOTES
Data, GI Status, Fluid Status or Edema, Nutrition Focused Physical Findings, Weight, Skin, Meal Time Behavior     Discharge Planning:     Too soon to determine     Electronically signed by Andre Lilly RD, LD on 12/29/20 at 10:56 AM EST    Contact: u [phone]

## 2020-12-29 NOTE — CONSULTS
PSYCHIATRY ATTENDING CONSULT    REASON FOR CONSULT: Confusion and agitation    REQUESTING PHYSICIAN: Dr.Suzan Lucas    CHIEF COMPLAINT: Confusion mild agitation    HISTORY OF PRESENT ILLNESS:  Edy Gomez  is a 44 y.o. with history of depression and polysubstance abuse possible bipolar depression and one admission on 7 S. in 2017, presented to the emergency department few weeks ago for back pain weight loss of 28 pounds in 6 months. He is also 1.5 pack/day smoker for years and also reported drinking a lot in the past.  But he quit smoking due to his recent incarceration released on December 1 and resume smoking. He was incarcerated for 3 to 4 years. He is now diagnosed with small cell lung cancer metastasized. He was agitated on and off due to back pain and needed PCA of morphine. Psychiatry consulted for agitation. Patient also has history of depression or possibly bipolar depression and is on Remeron which was put back to his current regimen by the primary team.  He was also started with Depakote for agitation related to possible delirium. Team is working on placing to hospice care. Family has been contacted. I saw the patient this morning he was very pleasant but he is confused he thinks today is Saturday and this is 2021. He is not on suicide watch. He is staying in his bed on his own without any problem. He is not showing any signs of any severe agitation for inpatient psychiatric level of care or acute psychiatric intervention. Insulin judgment limited due to delirium. He told me he has history of depression and bipolar illness and he also told me that he was in incarcerated but he could not tell me the exact date. Impulse control adequate. Cognitive function waxing and waning. Alert oriented to self and sometimes place.   There is no evidence of any acute psychiatric symptoms    PAST PSYCHIATRIC HISTORY:    History of depression possible bipolar depression  1 admission on 7 S. in 2017 for short period of time and was treated for depression with Remeron.     PAST MEDICAL HISTORY:       Diagnosis Date    Acute kidney injury ([facility] 7/14/2017    [facility]    Hepatitis C 1990    Heroin abuse ([facility] 7/14/2017    Hyperkalemia 7/14/2017    Hypertension     Liver disease     Non-traumatic rhabdomyolysis 7/14/2017    Psychiatric problem    Diagnosed with lung mass and possible cancer with metastasis and the team are working on that    MEDICAL ROS: General - negative, neurological - negative, ENT - negative, CV - negative, pulmonary - negative, GI - negative, dermatologic - negative, rheumatologic - negative, musculoskeletal - negative,  - negative, hematologic - negative    PAST SURGICAL HISTORY:       Procedure Laterality Date    BRONCHOSCOPY N/A 12/16/2020    BRONCHOSCOPY DIAGNOSTIC OR CELL [address] ONLY performed by Po Francis MD at [address]  12/16/2020    BRONCHOSCOPY BRUSHINGS performed by Po Francis MD at [address]  12/16/2020    BRONCHOSCOPY/TRANSBRONCHIAL NEEDLE BIOPSY performed by Po Francis MD at [address]  12/16/2020    BRONCHOSCOPY/TRANSBRONCHIAL LUNG BIOPSY performed by Po Francis MD at [id number] [facility] CT NEEDLE BIOPSY LIVER PERCUTANEOUS  12/14/2020    CT NEEDLE BIOPSY LIVER PERCUTANEOUS 12/14/2020 Alexa Barros MD [facility] CT    HERNIA REPAIR         MEDICATIONS: Current Facility-Administered Medications: melatonin tablet 3 mg, 3 mg, Oral, QPM  divalproex (DEPAKOTE SPRINKLE) capsule 125 mg, 125 mg, Oral, 2 times per day  methadone (DOLOPHINE) tablet 10 mg, 10 mg, Oral, 3 times per day  morphine sulfate (PF) injection 4 mg, 4 mg, Intravenous, Daily  polyethylene glycol (GLYCOLAX) packet 17 g, 17 g, Oral, Daily PRN  mineral oil enema 1 enema, 1 enema, Rectal, Once **FOLLOWED BY** [COMPLETED] bisacodyl (DULCOLAX) suppository 10 mg, 10 mg, Rectal, Once  sennosides-docusate sodium (SENOKOT-S) 8.6-50 MG tablet 2 tablet, 2 tablet, Oral, BID  oxyCODONE (OXY-IR) immediate release tablet 15 mg, 15 mg, Oral, Q4H PRN **OR** oxyCODONE HCl (OXY-IR) immediate release tablet 20 mg, 20 mg, Oral, Q4H PRN  furosemide (LASIX) tablet 20 mg, 20 mg, Oral, Daily  hydrALAZINE (APRESOLINE) tablet 25 mg, 25 mg, Oral, 3 times per day  pantoprazole (PROTONIX) tablet 40 mg, 40 mg, Oral, QAM AC  potassium bicarb-citric acid (EFFER-K) effervescent tablet 20 mEq, 20 mEq, Oral, BID  dexamethasone (DECADRON) tablet 4 mg, 4 mg, Oral, 3 times per day  potassium chloride (KLOR-CON M) extended release tablet 40 mEq, 40 mEq, Oral, Once  lidocaine PF 1 % injection 5 mL, 5 mL, Nebulization, Once  sodium chloride flush 0.9 % injection 10 mL, 10 mL, Intravenous, 2 times per day  sodium chloride flush 0.9 % injection 10 mL, 10 mL, Intravenous, PRN  LORazepam (ATIVAN) tablet 1 mg, 1 mg, Oral, Q4H PRN  LORazepam (ATIVAN) injection 1 mg, 1 mg, Intravenous, Q6H PRN  gabapentin (NEURONTIN) capsule 400 mg, 400 mg, Oral, TID  gadobenate dimeglumine (MULTIHANCE) injection 20 mL, 20 mL, Intravenous, ONCE PRN  bisacodyl (DULCOLAX) suppository 10 mg, 10 mg, Rectal, PRN  carvedilol (COREG) tablet 25 mg, 25 mg, Oral, BID WC  folic acid (FOLVITE) tablet 1 mg, 1 mg, Oral, Daily  mirtazapine (REMERON) tablet 15 mg, 15 mg, Oral, Nightly  acetaminophen (TYLENOL) tablet 650 mg, 650 mg, Oral, Q4H PRN  sodium chloride flush 0.9 % injection 10 mL, 10 mL, Intravenous, PRN  enoxaparin (LOVENOX) injection 40 mg, 40 mg, Subcutaneous, Daily    ALLERGIES:  Patient has no known allergies. FAMILY PSYCHIATRIC HISTORY:   Unable to obtain due to ongoing waxing and waning of    SOCIAL HISTORY:   Patient was not able to give me much information. SUBSTANCE ABUSE HISTORY:  reports that he has been smoking cigarettes. He has been smoking about 1.50 packs per day.  He has never used smokeless tobacco. He reports current alcohol use of about 72.0 standard drinks of alcohol per week. He reports current drug use. Drugs: IV and Cocaine. VITALS:   Vitals:    12/29/20 0701   BP: (!) 111/55   Pulse:    Resp:    Temp:    SpO2:        LABS:   No results displayed because visit has over 200 results. IMAGING:  Refer to the medical team note    MENTAL STATUS EXAMINATION  Mental status examination revealed a 78-year-old  man lying in bed restless but not trying to get out of bed . Psychomotor revealed increased agitation due to restlessness. Eye contact was fair. Speech was rambling. Mood \"I want to get out of here\", affect is anxious blunted. Thought process confused. Thought content is waxing and waning but denies any suicidal homicidal thought or any psychotic symptoms. Insight and judgment limited due to delirium. Cognitive function waxing and waning. Thinks this is 2021 and 2 days Saturday. Impulse control is adequate. Alert oriented to self and place but it is waxing and waning. ASSESSMENT:   Delirium secondary to another medical condition (possible metastatic lung cancer, back pain)  History of bipolar depression      PLAN & RECOMMENDATIONS:   Patient is not suicidal homicidal or psychotic manic and does not meet the criteria for inpatient level of psychiatric care or acute psychiatric intervention. However I agree with continuation of the Remeron 15 mg at bedtime and also Depakote 125 mg twice daily which will help with agitation and confusion related to delirium.   I will also add melatonin 3 mg every 6 p.m to reset the sleep cycle due to delirium  If you have question concern please let us know otherwise will sign off      Adán Andrade 12/29/2020 11:46 AM

## 2020-12-29 NOTE — PROGRESS NOTES
Spoke to pts son, Nohemi Laws. After speaking to pt and because of his diagnosis, I did tell his family it is ok for them to see pt, they will be coming from [place], pts., son, daughter and Ly Osborne. Nohemi Laws is going to talk to them and see when they would be able to get here.

## 2020-12-29 NOTE — CARE COORDINATION
12/29/2020 social work transition of care planning  Sw notified that [facility] has declined. Sw made referral to continuing health care. Will await decision.   Electronically signed by CAROLINA Youngblood on 12/29/2020 at 12:10 PM

## 2020-12-29 NOTE — PROGRESS NOTES
Hospitalist Progress Note      PCP: No primary care provider on file. Date of Admission: 12/12/2020    Chief Complaint: back pain    Hospital Course:   Patient admitted on the 12th with severe back pain  For the investigations have resulted in a diagnosis of left lung apex lesion with adenopathy with metastasis to the liver and spine. Also evaluated by neurosurgery with no intervention recommended. Palliative treatment to the spine. Palliative team following as well as pain control being attempted. He is already had bronchoscopy with EBUS. 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 Awaiting PD-L1 results to finalize  Radiation started 12/22 for metastatic non-small cell squamous cancer. 12/29: patient found in bed yesterday licking hand covered in stool. PT unable to ambulate patient, he refuses to wear brace despite education. Palliative continues to follow. Last [facility] 10/24 on 12/23, discharge plan is for [facility]. Subjective:    Patient lying in bed. He has no complaints at the present time. He is alert to person only.      Medications:  Reviewed    Infusion Medications   Scheduled Medications    divalproex  125 mg Oral 2 times per day    methadone  10 mg Oral 3 times per day    morphine  4 mg Intravenous Daily    mineral oil  1 enema Rectal Once    sennosides-docusate sodium  2 tablet Oral BID    furosemide  20 mg Oral Daily    hydrALAZINE  25 mg Oral 3 times per day    pantoprazole  40 mg Oral QAM AC    potassium bicarb-citric acid  20 mEq Oral BID    dexamethasone  4 mg Oral 3 times per day    potassium chloride  40 mEq Oral Once    lidocaine PF  5 mL Nebulization Once    sodium chloride flush  10 mL Intravenous 2 times per day    gabapentin  400 mg Oral TID    carvedilol  25 mg Oral BID WC    folic acid  1 mg Oral Daily    mirtazapine  15 mg Oral Nightly    enoxaparin 40 mg Subcutaneous Daily     PRN Meds: polyethylene glycol, oxyCODONE **OR** oxyCODONE, sodium chloride flush, LORazepam, LORazepam, gadobenate dimeglumine, bisacodyl, acetaminophen, sodium chloride flush      Intake/Output Summary (Last 24 hours) at 12/29/2020 0850  Last data filed at 12/28/2020 2329  Gross per 24 hour   Intake 840 ml   Output 450 ml   Net 390 ml       Exam:    BP (!) 111/55   Pulse 66   Temp 98 °F (36.7 °C) (Temporal)   Resp 20   Ht 5' 9\" (1.753 m)   Wt 195 lb (88.5 kg)   SpO2 97%   BMI 28.80 kg/m²     General appearance: No apparent distress, appears stated age and cooperative. HEENT: Pupils equal, round, and reactive to light. Conjunctivae/corneas clear. Neck: Supple, with full range of motion. No jugular venous distention. Trachea midline. Respiratory:  Normal respiratory effort. Clear to auscultation, bilaterally without Rales/Wheezes/Rhonchi. Cardiovascular: Regular rate and rhythm with normal S1/S2 without murmurs, rubs or gallops. Abdomen: Soft, non-tender, non-distended with normal bowel sounds. Musculoskeletal: No clubbing, cyanosis or edema bilaterally. Full range of motion without deformity. Skin: Skin color, texture, turgor normal.  No rashes or lesions. Neurologic:  Neurovascularly intact without any focal sensory/motor deficits.   Psychiatric: Alert and oriented, thought content appropriate, normal insight  Capillary Refill: Brisk,< 3 seconds   Peripheral Pulses: +2 palpable, equal bilaterally       Labs:   Recent Labs     12/27/20  0815 12/28/20  0700 12/29/20  0549   WBC 22.4* 14.5* 15.3*   HGB 12.2* 12.2* 12.5   HCT 37.1 36.1* 38.3    282 290     Recent Labs     12/27/20  0815 12/28/20  0700 12/29/20  0549    135 136   K 4.6 4.4 4.0   CL 94* 96* 96*   CO2 33* 32* 34*   BUN 29* 29* 34*   CREATININE 0.7 0.7 0.8   CALCIUM 9.9 9.9 10.0     Recent Labs     12/27/20  0815 12/28/20  0700 12/29/20  0549   AST 90* 68* 52*   * 162* 148*   BILITOT 0.6 0.6 0. 6   ALKPHOS 65 73 73     No results for input(s): INR in the last 72 hours. No results for input(s): Katmayur Dixonnel in the last 72 hours. CT NEEDLE BIOPSY LIVER PERCUTANEOUS   Final Result   Successful uncomplicated CT and fluoroscopic guided liver   mass biopsy. If there are any physician concerns regarding this report, a   Radiologist can be reached by calling the following number [phone]. CT GUIDED NEEDLE PLACEMENT   Final Result   Successful uncomplicated CT and fluoroscopic guided liver   mass biopsy. If there are any physician concerns regarding this report, a   Radiologist can be reached by calling the following number [phone]. NM BONE SCAN WHOLE BODY   Final Result   Findings compatible with degenerative changes    Multiple foci of abnormal tracer uptake, consistent widespread   metastatic disease. MRI BRAIN W WO CONTRAST   Final Result   1. There is a small extra-axial enhancing mass, which appears to span the   right and left aspects of the mid falx. This measures up to 16 mm. No   significant adjacent parenchymal edema is seen. This may represent a   meningioma. However, given history of malignancy, suggest a short-term   follow-up examination in 3 months to evaluate for stability. 2. Otherwise, no acute intracranial abnormality. No acute infarct. 3. Mild global parenchymal volume loss with mild-to-moderate chronic   microvascular ischemic changes. XR FEMUR LEFT (MIN 2 VIEWS)   Final Result   1. Lytic lesion in the left femoral neck. No additional evidence of osseous   metastasis on this exam.  No evidence of pathologic fracture. 2.  Mild left hip joint osteoarthritis. MRI LUMBAR SPINE W WO CONTRAST   Final Result   1. Metastatic lesion in the L3 vertebral body, extending into the right   pedicle and superior articular facet. Pathological fracture of L3, with   approximately 60% loss of height.    2. Metastatic infiltration along the endplates L3 and L4. No pathological   fracture seen at these levels. 3. Infiltration of tumor into the epidural space at L3 resulting in [place], LATERAL RECESSES AND THE RIGHT NEURAL FORAMINA. 4. Large right paraspinal mass is contiguous with the bony metastatic lesions   and extends from the level of L2 to L5-S1. The mass exerts mass effect on   the psoas, which is laterally displaced. Areas of cystic degeneration are   seen right paraspinal mass at the level of L4 and L5.   5. Partially included in the field of view of this study is a small   metastatic lesion in the left iliac bone      MRI THORACIC SPINE W WO CONTRAST   Final Result   1. Metastatic lesions in the T8 and T12 vertebral bodies, more prominent in   the former. 2. Pathological fracture of the T8 vertebral body with approximately 40% loss   of height. 3. Epidural extension of tumor along the left lateral aspect at T8, extending   into the left T8-9 neural foramina. 4. Moderate central canal stenosis at T8.   5. No cord lesion or cord edema seen. XR EYE FOREIGN BODY   Final Result   No evidence of metallic foreign body within the orbits. CT ABDOMEN PELVIS W IV CONTRAST Additional Contrast? None   Final Result   Hypodense lesions in the right lobe of the liver, the larger measuring 3.5 cm   in diameter and was consistent with metastatic disease. Mildly heterogeneous mass extending cephalad from the head of the pancreas,   most suspicious for pancreatic malignancy measuring approximately 2.4 x 3 x   3.2 cm in diameter. Multiple lytic lesions as described involving the T8 vertebral body, T12   vertebral body, L3 vertebral body, and proximal left femur.    Pathologic compression fracture of L3 and extensive extension of malignant   process to the paraspinal soft tissues anteriorly and to the right of L3, to   the adjacent intervertebral discs, and spinal canal.  Extension into the   right pedicle Assessment/Plan:    Active Hospital Problems    Diagnosis Date Noted    Lung mass [R91.8] 12/17/2020    Lung cancer metastatic to bone ([facility] [address] [C34.90, C79.51] 12/16/2020    Metastatic cancer to liver [facility]) [C78.7]     Pathologic lumbar vertebral fracture, initial encounter [facility] 12/13/2020    Malignant neoplasm metastatic to bone ([facility]) [C79.51]      Plan  Pain control  Recommendations per hem/onc  Recommendations per palliative  Monitor bowel function  Neurovascular checks      DVT Prophylaxis: lovenox  Diet: DIET GENERAL;  Dietary Nutrition Supplements: Standard High Calorie Oral Supplement  Code Status: DNR-CCA    PT/OT Eval Status: ordered    Dispo - discharge planning      Adrian Mckeon, CNP

## 2020-12-29 NOTE — PROGRESS NOTES
Occupational Therapy  OT BEDSIDE TREATMENT NOTE      Date:2020  Patient Name: Kuldeep Aguayo  MRN: [id number]  : 1970  Room: [address] [place] [address]A       Pt's chart reviewed and treatment attempted, pt currently off floor. Will attempt at a later time/date.         Bette Shetty

## 2020-12-29 NOTE — PROGRESS NOTES
Hematology/Oncology Inpatient f/up:      Reason for Visit:   Metastatic NSCLC. Subjective: The patient is restless, the back pain had improved. Physical Exam:  BP (!) 130/50   Pulse 82   Temp 98.7 °F (37.1 °C) (Temporal)   Resp 20   Ht 5' 9\" (1.753 m)   Wt 195 lb (88.5 kg)   SpO2 (!) 85%   BMI 28.80 kg/m²   GENERAL: awake and alert, agitated. HEENT: PERRLA; EOMI. Oropharynx clear. NECK: Supple. No palpable cervical or supraclavicular lymphadenopathy. LUNGS: Decreased air entry bilaterally  CARDIOVASCULAR: Regular rhythm, tachycardic. ABDOMEN: Soft. Non-tender, non-distended. Positive bowel sounds. EXTREMITIES: Without clubbing, cyanosis, or edema. NEUROLOGIC: No focal deficits.    ECOG PS 1     Impression/Plan:      Mr. Deborah Barroso is a 49-year-old male patient, with a past medical history significant for tobacco use disorder, degenerative joint disease, hypertension, liver disease, polysubstance abuse who had presented with worsening back pain, he was found to have metastatic disease, he has a large heterogeneous left hilar mass measuring about 5.4 cm, with occlusion of the left upper lobe bronchus with severe bronchial narrowing to the left lower lobe, left lung apex lesion measuring 1.5 cm, mediastinal adenopathy, metastasis to the liver, possibly the pancreas (vs primary pancreatic), and the spine,MRI of the thoracic spine had revealed metastatic lesions in the T8 and T12 vertebral bodies, pathologic fracture of the T8 vertebral body with approximately 40% loss of height, epidural extension of tumor along the left lateral aspect of the T8, extending into the left T8-9 neural foramina, moderate central canal stenosis at T8, no cord lesion or cord edema seen, he also has a destructive lesion along the right lateral aspect of the L3 vertebral body, lumbar spine MRI has revealed metastatic lesion in the L3 vertebral body, extending into the right pedicle and superior sacral facets, lateral

## 2020-12-29 NOTE — PROGRESS NOTES
Palliative Care Department  [phone]  Palliative Care Progress Note  Provider 613 Lilian Jaswant  [id number]  Hospital Day: 18    Referring Provider: Yonis Kauffman MD  Palliative Medicine was consulted for assistance with: Symptom management, goals of care, CODE STATUS and family support    Brief summary:  Loe Booker is a 44 y.o. who presented to the hospital with chief complaints of back pain and was admitted 12/12/2020 with diagnosis(ses) of malignant neoplasm metastatic to bone, lung mass, pathologic fracture of vertebrae due to neoplastic disease. Patient has started workup for malignancy. ASSESSMENT/PLAN:   Pertinent hospital diagnoses:  1. Metastatic non-small cell lung cancer, squamous type   -Liver and lung biopsy positive for malignancy, pathology and cytology reviewed  -Radiation oncology following for palliative radiation to the spine  - MRI brain small extra-axial enhancing mass- meningioma vs metastasis  - bone scan shows widespread metastatic disease     2.  Pain associated with neoplasm  - oxycodone change to 15-20mg q4 hrs prn- has had 80mg in past 24 hours  - increased methadone to 10mg q8 hours, EKG no QT prolongation  - protonix IV prophylaxis  -  EKG screening normal QTc, ok for methadone  - continue neurontin to 400mg TID  -  IV morphine 4 mg to receive 15 to 30 minutes before leaving for radiation- did not have IV access yesterday and therefore refused radiation treatment     3.  GI-constipation resolved  - senna-s 2 tabs bid  - miralax 17 gm daily PRN     4.  Spinal cord stenosis secondary to tumor infiltration  -Receiving steroids  -Significant concerns with cord compression and worsening neurologic status, on radiation ASAP per Dr. Rudi Patel. PALLIATIVE CARE ENCOUNTER  - Capacity: At this time, Leo Booker, Does have capacity for medical decision-making.   Capacity is time limited and situation/question specific  - Outcome of goals of care meeting: Consult to psych, provide advanced directives  - Code Status:   full  - Advanced directives: None  - Surrogate decision maker/Legal NOK:     Contacts:    Adeline Charlton [phone]   Son Eva Cortez [phone]   Daughter Robert Meza [phone]  - Spiritual assessment: none  - Bereavement and grief: diagnosis and young age    - DISPO:  Nursing facility    Referrals to: none today    Subjective   Chart reviewed  Discussed with RN    Patient agitated and restless, laying in bed with no gown or sheet covering. Asking for someone to release the water pressure. Per bedside nurse has been incontinent of urine/stool, had BM in urinal, was licking stool off his hand. Spoke with patient's son, who expresses concern about how quickly patient seems to have gotten worse. Asked son if patient had ever discussed with the family if he would want hospice at the end of life, son said no but that he would discuss with the family whether they would want patient to go with hospice and call back.     Inpatient medications reviewed: yes  Home Medications reviewed: yes    OBJECTIVE:   Prognosis: Poor    Physical Exam:  BP (!) 111/55   Pulse 66   Temp 98 °F (36.7 °C) (Temporal)   Resp 20   Ht 5' 9\" (1.753 m)   Wt 195 lb (88.5 kg)   SpO2 97%   BMI 28.80 kg/m²   Gen:  Appears stated age, rolling back and forth  HEENT:  Normocephalic, atraumatic, mucosa dry, PERRLA, EOMI, sclera anicteric  Neck:  Supple, trachea midline, no JVD  Lungs:  respirations unlabored, clear to auscultation bilaterally  Heart[de-identified]  normal S1S2, regular rate and rhythm  Abd:  Soft, non tender, non distended, bowel sounds present  :  No rogers  Ext:  no edema, weakness bilateral hip flexor- uses hands to move legs around  Skin:   erythema of right arm, and trunk, scar under left scapula  Neuro:  confused, agitated intermittently    Objective data reviewed: labs, images, records, medication use, vitals and chart    Time/Communication  Greater than 50% of time spent, total 35 minutes in counseling and coordination of care at the bedside/over the telephone regarding goals of care, symptom management, diagnosis and prognosis and see above. Thank you for allowing Palliative Medicine to participate in the care of Chapo Mahan.       Provider 101 Kaylee [place]

## 2020-12-29 NOTE — ONCOLOGY
Pt came down today and received 300 cgy radiation to the t-spine.l-spine,sacrum, and lt hip per Dr Cecelia Cooper in Radiation Oncology

## 2020-12-30 NOTE — PROGRESS NOTES
Palliative Care Department  [phone]  Palliative Care Progress Note  Provider Mickey Michaud  [id number]  Hospital Day: 23    Referring Provider: Raffy Hernandez MD  Palliative Medicine was consulted for assistance with: Symptom management, goals of care, CODE STATUS and family support    Brief summary:  Alan Abdalla is a 44 y.o. who presented to the hospital with chief complaints of back pain and was admitted 12/12/2020 with diagnosis(ses) of malignant neoplasm metastatic to bone, lung mass, pathologic fracture of vertebrae due to neoplastic disease. Patient has started workup for malignancy. ASSESSMENT/PLAN:   Pertinent hospital diagnoses:  1. Metastatic non-small cell lung cancer, squamous type   -Liver and lung biopsy positive for malignancy, pathology and cytology reviewed  -Radiation oncology following for palliative radiation to the spine  - MRI brain small extra-axial enhancing mass- meningioma vs metastasis  - bone scan shows widespread metastatic disease     2.  Pain associated with neoplasm  - oxycodone change to 15-20mg q4 hrs prn- has had 80mg in past 24 hours  - increased methadone to 10mg q8 hours, EKG no QT prolongation  - protonix IV prophylaxis  -  EKG screening normal QTc, ok for methadone  - continue neurontin to 400mg TID  -  IV morphine 4 mg to receive 15 to 30 minutes before leaving for radiation  - calcium up to 11.1, not likely cause of pain     3. GI-constipation resolved  - senna-s 2 tabs bid  - miralax 17 gm daily PRN     4.  Spinal cord stenosis secondary to tumor infiltration  -Receiving steroids  -Significant concerns with cord compression and worsening neurologic status, receiving palliative radiation per Dr. Anthony Topete. PALLIATIVE CARE ENCOUNTER  - Capacity: At this time, Alan Abdalla, Does have capacity for medical decision-making.   Capacity is time limited and situation/question specific  - Outcome of goals of care meeting: Consult to psych, provide advanced directives  - Code Status:   full  - Advanced directives: None  - Surrogate decision maker/Legal NOK:     Contacts:    Roney Medina [phone]   Son Yelena Quarles [phone]   Daughter Cruzito Walker [phone]  - Spiritual assessment: none  - Bereavement and grief: diagnosis and young age    - DISPO:  Nursing facility    Referrals to: none today    Subjective   Chart reviewed  Discussed with RN    Patient resting calmly in bed. Had additional phone conversations with family yesterday late in the day, they are thinking they would like to pursue further treatment for the cancer and hope that it improves the patient's quality of life. Left message for patient's ex-wife today to return her message, was given ok by patient to speak with her. Patient reports he is feeling somewhat better today, and is anxious to be discharged. Per nurse hasn't gotten placement yet. Still getting ativan for agitation.     Inpatient medications reviewed: yes  Home Medications reviewed: yes    OBJECTIVE:   Prognosis: Poor    Physical Exam:  BP (!) 111/54   Pulse 106   Temp 97.9 °F (36.6 °C) (Temporal)   Resp 18   Ht 5' 9\" (1.753 m)   Wt 195 lb (88.5 kg)   SpO2 96%   BMI 28.80 kg/m²   Gen:  Appears stated age, NAD, drinking Ensure shake while laying on stomach  HEENT:  Normocephalic, atraumatic, mucosa dry, PERRLA, EOMI, sclera anicteric  Neck:  Supple, trachea midline, no JVD  Lungs:  respirations unlabored, clear to auscultation bilaterally  Heart[de-identified]  normal S1S2, regular rate and rhythm  Abd:  Soft, non tender, non distended, bowel sounds present  :  No rogers  Ext:  no edema, weakness bilateral hip flexor- uses hands to move legs around  Skin:   erythema of right arm, and trunk, scar under left scapula  Neuro:  confused, agitated intermittently    Objective data reviewed: labs, images, records, medication use, vitals and chart    Time/Communication  Greater than 50% of time spent, total 15 minutes in counseling and coordination of care at the bedside/over the telephone regarding goals of care, symptom management, diagnosis and prognosis and see above. Thank you for allowing Palliative Medicine to participate in the care of Leanna Jeronimo.       Provider 101 [facility]

## 2020-12-30 NOTE — PROGRESS NOTES
Hospitalist Progress Note      PCP: No primary care provider on file. Date of Admission: 12/12/2020    Chief Complaint: back pain    Hospital Course:   Patient admitted on the 12th with severe back pain  For the investigations have resulted in a diagnosis of left lung apex lesion with adenopathy with metastasis to the liver and spine. Also evaluated by neurosurgery with no intervention recommended. Palliative treatment to the spine. Palliative team following as well as pain control being attempted. He is already had bronchoscopy with EBUS. 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 Awaiting PD-L1 results to finalize  Radiation started 12/22 for metastatic non-small cell squamous cancer. 12/29: patient found in bed yesterday licking hand covered in stool. PT unable to ambulate patient, he refuses to wear brace despite education. Palliative continues to follow. Last [facility] 10/24 on 12/23, discharge plan is for [facility].   12/30: seen by psych, medications adjusted. Palliative has been in contact with son in regards to hospice. Family has declined hospice and chemo. They would like the him to be sent to a facility close to them in [facility]. Subjective:    Patient lying in bed, he is more alert today than yesterday.      Medications:  Reviewed    Infusion Medications   Scheduled Medications    melatonin  3 mg Oral QPM    divalproex  125 mg Oral 2 times per day    methadone  10 mg Oral 3 times per day    morphine  4 mg Intravenous Daily    mineral oil  1 enema Rectal Once    sennosides-docusate sodium  2 tablet Oral BID    furosemide  20 mg Oral Daily    hydrALAZINE  25 mg Oral 3 times per day    pantoprazole  40 mg Oral QAM AC    potassium bicarb-citric acid  20 mEq Oral BID    dexamethasone  4 mg Oral 3 times per day    potassium chloride  40 mEq Oral Once    lidocaine PF  5 mL Nebulization Once    sodium chloride flush  10 mL Intravenous 2 times per day    gabapentin  400 mg Oral TID    carvedilol  25 mg Oral BID WC    folic acid  1 mg Oral Daily    mirtazapine  15 mg Oral Nightly    enoxaparin  40 mg Subcutaneous Daily     PRN Meds: polyethylene glycol, oxyCODONE **OR** oxyCODONE, sodium chloride flush, LORazepam, LORazepam, gadobenate dimeglumine, bisacodyl, acetaminophen, sodium chloride flush      Intake/Output Summary (Last 24 hours) at 12/30/2020 0859  Last data filed at 12/30/2020 0646  Gross per 24 hour   Intake 820 ml   Output --   Net 820 ml       Exam:    BP (!) 111/54   Pulse 106   Temp 97.9 °F (36.6 °C) (Temporal)   Resp 18   Ht 5' 9\" (1.753 m)   Wt 195 lb (88.5 kg)   SpO2 96%   BMI 28.80 kg/m²     General appearance: No apparent distress, appears stated age and cooperative. HEENT: Pupils equal, round, and reactive to light. Conjunctivae/corneas clear. Neck: Supple, with full range of motion. No jugular venous distention. Trachea midline. Respiratory:  Normal respiratory effort. Clear to auscultation, bilaterally without Rales/Wheezes/Rhonchi. Cardiovascular: Regular rate and rhythm with normal S1/S2 without murmurs, rubs or gallops. Abdomen: Soft, non-tender, non-distended with normal bowel sounds. Musculoskeletal: No clubbing, cyanosis or edema bilaterally. Full range of motion without deformity. Skin: Skin color, texture, turgor normal.  No rashes or lesions. Neurologic:  Neurovascularly intact without any focal sensory/motor deficits.   Psychiatric: Alert and oriented, thought content appropriate, normal insight  Capillary Refill: Brisk,< 3 seconds   Peripheral Pulses: +2 palpable, equal bilaterally       Labs:   Recent Labs     12/28/20  0700 12/29/20  0549 12/30/20 0527   WBC 14.5* 15.3* 17.2*   HGB 12.2* 12.5 12.7   HCT 36.1* 38.3 39.7    290 272     Recent Labs     12/28/20  0700 12/29/20  0549 12/30/20 0527    136 140   K 4.4 4.0 5.1*   CL 96* 96* 96*   CO2 32* 34* 37*   BUN 29* 34* 33*   CREATININE 0.7 0.8 0.9   CALCIUM 9.9 10.0 11.1*     Recent Labs     12/28/20  0700 12/29/20  0549 12/30/20  0527   AST 68* 52* 44*   * 148* 125*   BILITOT 0.6 0.6 0.6   ALKPHOS 73 73 77     No results for input(s): INR in the last 72 hours. No results for input(s): Towana Megan in the last 72 hours. CT NEEDLE BIOPSY LIVER PERCUTANEOUS   Final Result   Successful uncomplicated CT and fluoroscopic guided liver   mass biopsy. If there are any physician concerns regarding this report, a   Radiologist can be reached by calling the following number [phone]. CT GUIDED NEEDLE PLACEMENT   Final Result   Successful uncomplicated CT and fluoroscopic guided liver   mass biopsy. If there are any physician concerns regarding this report, a   Radiologist can be reached by calling the following number [phone]. NM BONE SCAN WHOLE BODY   Final Result   Findings compatible with degenerative changes    Multiple foci of abnormal tracer uptake, consistent widespread   metastatic disease. MRI BRAIN W WO CONTRAST   Final Result   1. There is a small extra-axial enhancing mass, which appears to span the   right and left aspects of the mid falx. This measures up to 16 mm. No   significant adjacent parenchymal edema is seen. This may represent a   meningioma. However, given history of malignancy, suggest a short-term   follow-up examination in 3 months to evaluate for stability. 2. Otherwise, no acute intracranial abnormality. No acute infarct. 3. Mild global parenchymal volume loss with mild-to-moderate chronic   microvascular ischemic changes. XR FEMUR LEFT (MIN 2 VIEWS)   Final Result   1. Lytic lesion in the left femoral neck. No additional evidence of osseous   metastasis on this exam.  No evidence of pathologic fracture. 2.  Mild left hip joint osteoarthritis.       MRI LUMBAR SPINE W WO CONTRAST   Final Result   1. Metastatic lesion in the L3 vertebral body, extending into the right   pedicle and superior articular facet. Pathological fracture of L3, with   approximately 60% loss of height. 2. Metastatic infiltration along the endplates L3 and L4. No pathological   fracture seen at these levels. 3. Infiltration of tumor into the epidural space at L3 resulting in [place], LATERAL RECESSES AND THE RIGHT NEURAL FORAMINA. 4. Large right paraspinal mass is contiguous with the bony metastatic lesions   and extends from the level of L2 to L5-S1. The mass exerts mass effect on   the psoas, which is laterally displaced. Areas of cystic degeneration are   seen right paraspinal mass at the level of L4 and L5.   5. Partially included in the field of view of this study is a small   metastatic lesion in the left iliac bone      MRI THORACIC SPINE W WO CONTRAST   Final Result   1. Metastatic lesions in the T8 and T12 vertebral bodies, more prominent in   the former. 2. Pathological fracture of the T8 vertebral body with approximately 40% loss   of height. 3. Epidural extension of tumor along the left lateral aspect at T8, extending   into the left T8-9 neural foramina. 4. Moderate central canal stenosis at T8.   5. No cord lesion or cord edema seen. XR EYE FOREIGN BODY   Final Result   No evidence of metallic foreign body within the orbits. CT ABDOMEN PELVIS W IV CONTRAST Additional Contrast? None   Final Result   Hypodense lesions in the right lobe of the liver, the larger measuring 3.5 cm   in diameter and was consistent with metastatic disease. Mildly heterogeneous mass extending cephalad from the head of the pancreas,   most suspicious for pancreatic malignancy measuring approximately 2.4 x 3 x   3.2 cm in diameter. Multiple lytic lesions as described involving the T8 vertebral body, T12   vertebral body, L3 vertebral body, and proximal left femur.    Pathologic compression fracture of L3 and extensive extension of malignant   process to the paraspinal soft tissues anteriorly and to the right of L3, to   the adjacent intervertebral discs, and spinal canal.  Extension into the   right pedicle and right articular facet and large component extending to the   right iliopsoas muscle which is asymmetrically enlarged. Hypodense process   extending caudally along the course of the right psoas muscle with mild   peripheral enhancement, likely malignant involvement versus associated   abscess, felt to be a less likely consideration. Intraspinal extension of malignant process at the level of T8, and L3.      CT CHEST W CONTRAST   Final Result   Large heterogeneous left hilar mass as described consistent with malignancy. Occlusion of left upper lobe bronchus and severe bronchial narrowing to the   left lower lobe. Mildly spiculated nodular lesion, pleural based in the left lung apex likely   satellite malignant lesion measuring 1.5 cm. Mediastinal adenopathy likely manifesting malignant involvement. Large metastatic lesion to the T8 vertebral body with paraspinal an   intraspinal extension of malignancy and pathologic compression fracture of T8.      CT LUMBAR SPINE WO CONTRAST   Final Result   1. Lytic destructive lesion along the right lateral aspect of the L3   vertebral body, involving the right pedicle, transverse process and superior   articular facet. 2. Pathological fracture on the right with loss of height by approximately   60%. The lesion may represent malignancy or an infectious process. Destructive changes in the discs characteristic of discitis/osteomyelitis are   not evident on this study. Further evaluation with pre and post contrast   enhanced MRI is recommended. 3. Soft tissue fullness extends from the L3 vertebral body into the adjacent   psoas muscle, which is landis than the contralateral side.   The soft tissue   component may represent malignancy or infection. 4. Extent of epidural involvement of disease at L3 is not well delineated on   this study. Pre and post contrast enhanced MRI should clarify. 5. Variable degrees of degenerative changes, as described.           Assessment/Plan:    Active Hospital Problems    Diagnosis Date Noted    Lung mass [R91.8] 12/17/2020    Lung cancer metastatic to bone ([facility] [address] [C34.90, C79.51] 12/16/2020    Metastatic cancer to liver [facility]) [C78.7]     Pathologic lumbar vertebral fracture, initial encounter [facility] 12/13/2020    Malignant neoplasm metastatic to bone ([facility]) [C79.51]      Plan  Pain control  Recommendations per hem/onc  Recommendations per palliative  Monitor bowel function  Neurovascular checks  Recommendations per psych    DVT Prophylaxis: lovenox  Diet: DIET GENERAL;  Dietary Nutrition Supplements: Standard High Calorie Oral Supplement  Code Status: DNR-CCA    PT/OT Eval Status: ordered    Dispo - discharge planning      Roshni Gonzales, CNP

## 2020-12-30 NOTE — PROGRESS NOTES
Catracho Sanford Mariia  12/30/2020  Wt Readings from Last 3 Encounters:   12/13/20 195 lb (88.5 kg)   12/04/20 212 lb (96.2 kg)   07/28/17 225 lb 11.2 oz (102.4 kg)     There is no height or weight on file to calculate BMI. Treatment Area:T7-12, L2-sacrum, hips/femur    Patient was seen today for weekly visit. Comfort Alteration  KPS:30%  Fatigue: Severe      Nutritional Alteration  Anorexia: Yes   Nausea: No   Vomiting: No     Elimination Alterations  Constipation: no  Diarrhea:  no  Bowel Incontinence: No  Urinary Incontinence: No    Skin Alteration   Sensation:skin clear    Radiation Dermatitis:  na      Emotional  Coping: ineffective    Sexuality Alteration  na    Injury, potential bleeding or infection: na        Lab Results   Component Value Date    WBC 17.2 (H) 12/30/2020     12/30/2020       There were no vitals taken for this visit. BP within normal range? yes         Assessment/Plan:  4/10fc 1200/3000cGY completed  Pt very uncomfortable with his right hip. Seems to thrash and move around and then gets very still and quiet for periods at a time. Answers some questions correctly and other inappropriately. Vitals  97%, 68, 18, 108/72    Prior to treatment. Dr. Rudi Patel spoke with patient and  pt's son  Pablo,12/29/2020 and and they want him to continue with treatment.      Godrfey Paz

## 2020-12-30 NOTE — PLAN OF CARE
Various distraction/involvement efforts seemed successful at keeping pt in bed this mary. Although earlier expressing interest in getting up to [facility], he later declined. Offered the opportunity x 2 this shift. Required spoon feeding and liqu cups held as per his decreasing dexterity. BP at low end of nl. Held appresoline. Oxy given. See mar and flow sheets.

## 2020-12-30 NOTE — ONCOLOGY
Pt came down today for radiation tx fraction 4 of 10 to the T-spine, L-spine, sacrum, and lt hip per Dr Timothy Bustillos in Radiation Oncology.

## 2020-12-30 NOTE — CARE COORDINATION
12/30/2020 social work transition of care planning  Sw received call from pt's son's mother. She wanted to know if pt could be transferred to [place]? Sw explained that [facility]'s [facility] is [employer] and would not cover [place]. She then asked if pt could be transferred to [facility] that was not far from her home? Sw requested the names of some facilities and we can try. Sw explained that transport may be private pay. Sw will follow. Pt's family does not want chemo or hospice at this time.   Electronically signed by CAROLINA Colón on 12/30/2020 at 3:12 PM

## 2020-12-31 NOTE — PROGRESS NOTES
chloride  40 mEq Oral Once    lidocaine PF  5 mL Nebulization Once    sodium chloride flush  10 mL Intravenous 2 times per day    gabapentin  400 mg Oral TID    carvedilol  25 mg Oral BID WC    folic acid  1 mg Oral Daily    mirtazapine  15 mg Oral Nightly    enoxaparin  40 mg Subcutaneous Daily     PRN Meds: polyethylene glycol, oxyCODONE **OR** oxyCODONE, sodium chloride flush, LORazepam, LORazepam, gadobenate dimeglumine, bisacodyl, acetaminophen, sodium chloride flush      Intake/Output Summary (Last 24 hours) at 12/31/2020 0917  Last data filed at 12/31/2020 0556  Gross per 24 hour   Intake 760 ml   Output --   Net 760 ml       Exam:    /78   Pulse 99   Temp 98 °F (36.7 °C) (Temporal)   Resp 18   Ht 5' 9\" (1.753 m)   Wt 195 lb (88.5 kg)   SpO2 97%   BMI 28.80 kg/m²     General appearance: No apparent distress, appears stated age and cooperative. HEENT: Pupils equal, round, and reactive to light. Conjunctivae/corneas clear. Neck: Supple, with full range of motion. No jugular venous distention. Trachea midline. Respiratory:  Normal respiratory effort. Clear to auscultation, bilaterally without Rales/Wheezes/Rhonchi. Cardiovascular: Regular rate and rhythm with normal S1/S2 without murmurs, rubs or gallops. Abdomen: Soft, non-tender, non-distended with normal bowel sounds. Musculoskeletal: No clubbing, cyanosis or edema bilaterally. Full range of motion without deformity. Skin: Skin color, texture, turgor normal.  No rashes or lesions. Neurologic:  Neurovascularly intact without any focal sensory/motor deficits.   Psychiatric: Alert and oriented, thought content appropriate, normal insight  Capillary Refill: Brisk,< 3 seconds   Peripheral Pulses: +2 palpable, equal bilaterally       Labs:   Recent Labs     12/29/20 0549 12/30/20 0527 12/31/20 0540   WBC 15.3* 17.2* 14.9*   HGB 12.5 12.7 12.4*   HCT 38.3 39.7 38.4    272 232     Recent Labs     12/29/20 0549 12/30/20 0527 12/31/20  0540    140 144   K 4.0 5.1* 4.3   CL 96* 96* 100   CO2 34* 37* 33*   BUN 34* 33* 37*   CREATININE 0.8 0.9 0.8   CALCIUM 10.0 11.1* 11.0*     Recent Labs     12/29/20  0549 12/30/20  0527 12/31/20  0540   AST 52* 44* 40*   * 125* 105*   BILITOT 0.6 0.6 0.4   ALKPHOS 73 77 76     No results for input(s): INR in the last 72 hours. No results for input(s): Christelle Older in the last 72 hours. CT NEEDLE BIOPSY LIVER PERCUTANEOUS   Final Result   Successful uncomplicated CT and fluoroscopic guided liver   mass biopsy. If there are any physician concerns regarding this report, a   Radiologist can be reached by calling the following number [phone]. CT GUIDED NEEDLE PLACEMENT   Final Result   Successful uncomplicated CT and fluoroscopic guided liver   mass biopsy. If there are any physician concerns regarding this report, a   Radiologist can be reached by calling the following number [phone]. NM BONE SCAN WHOLE BODY   Final Result   Findings compatible with degenerative changes    Multiple foci of abnormal tracer uptake, consistent widespread   metastatic disease. MRI BRAIN W WO CONTRAST   Final Result   1. There is a small extra-axial enhancing mass, which appears to span the   right and left aspects of the mid falx. This measures up to 16 mm. No   significant adjacent parenchymal edema is seen. This may represent a   meningioma. However, given history of malignancy, suggest a short-term   follow-up examination in 3 months to evaluate for stability. 2. Otherwise, no acute intracranial abnormality. No acute infarct. 3. Mild global parenchymal volume loss with mild-to-moderate chronic   microvascular ischemic changes. XR FEMUR LEFT (MIN 2 VIEWS)   Final Result   1. Lytic lesion in the left femoral neck. No additional evidence of osseous   metastasis on this exam.  No evidence of pathologic fracture. 2.  Mild left hip joint osteoarthritis. MRI LUMBAR SPINE W WO CONTRAST   Final Result   1. Metastatic lesion in the L3 vertebral body, extending into the right   pedicle and superior articular facet. Pathological fracture of L3, with   approximately 60% loss of height. 2. Metastatic infiltration along the endplates L3 and L4. No pathological   fracture seen at these levels. 3. Infiltration of tumor into the epidural space at L3 resulting in [place], LATERAL RECESSES AND THE RIGHT NEURAL FORAMINA. 4. Large right paraspinal mass is contiguous with the bony metastatic lesions   and extends from the level of L2 to L5-S1. The mass exerts mass effect on   the psoas, which is laterally displaced. Areas of cystic degeneration are   seen right paraspinal mass at the level of L4 and L5.   5. Partially included in the field of view of this study is a small   metastatic lesion in the left iliac bone      MRI THORACIC SPINE W WO CONTRAST   Final Result   1. Metastatic lesions in the T8 and T12 vertebral bodies, more prominent in   the former. 2. Pathological fracture of the T8 vertebral body with approximately 40% loss   of height. 3. Epidural extension of tumor along the left lateral aspect at T8, extending   into the left T8-9 neural foramina. 4. Moderate central canal stenosis at T8.   5. No cord lesion or cord edema seen. XR EYE FOREIGN BODY   Final Result   No evidence of metallic foreign body within the orbits. CT ABDOMEN PELVIS W IV CONTRAST Additional Contrast? None   Final Result   Hypodense lesions in the right lobe of the liver, the larger measuring 3.5 cm   in diameter and was consistent with metastatic disease. Mildly heterogeneous mass extending cephalad from the head of the pancreas,   most suspicious for pancreatic malignancy measuring approximately 2.4 x 3 x   3.2 cm in diameter.    Multiple lytic lesions as described involving the T8 vertebral body, T12   vertebral body, L3 vertebral body, and proximal left femur. Pathologic compression fracture of L3 and extensive extension of malignant   process to the paraspinal soft tissues anteriorly and to the right of L3, to   the adjacent intervertebral discs, and spinal canal.  Extension into the   right pedicle and right articular facet and large component extending to the   right iliopsoas muscle which is asymmetrically enlarged. Hypodense process   extending caudally along the course of the right psoas muscle with mild   peripheral enhancement, likely malignant involvement versus associated   abscess, felt to be a less likely consideration. Intraspinal extension of malignant process at the level of T8, and L3.      CT CHEST W CONTRAST   Final Result   Large heterogeneous left hilar mass as described consistent with malignancy. Occlusion of left upper lobe bronchus and severe bronchial narrowing to the   left lower lobe. Mildly spiculated nodular lesion, pleural based in the left lung apex likely   satellite malignant lesion measuring 1.5 cm. Mediastinal adenopathy likely manifesting malignant involvement. Large metastatic lesion to the T8 vertebral body with paraspinal an   intraspinal extension of malignancy and pathologic compression fracture of T8.      CT LUMBAR SPINE WO CONTRAST   Final Result   1. Lytic destructive lesion along the right lateral aspect of the L3   vertebral body, involving the right pedicle, transverse process and superior   articular facet. 2. Pathological fracture on the right with loss of height by approximately   60%. The lesion may represent malignancy or an infectious process. Destructive changes in the discs characteristic of discitis/osteomyelitis are   not evident on this study. Further evaluation with pre and post contrast   enhanced MRI is recommended. 3. Soft tissue fullness extends from the L3 vertebral body into the adjacent   psoas muscle, which is landis than the contralateral side.   The soft tissue   component may represent malignancy or infection. 4. Extent of epidural involvement of disease at L3 is not well delineated on   this study. Pre and post contrast enhanced MRI should clarify. 5. Variable degrees of degenerative changes, as described.           Assessment/Plan:    Active Hospital Problems    Diagnosis Date Noted    Lung mass [R91.8] 12/17/2020    Lung cancer metastatic to bone ([facility] [address] [C34.90, C79.51] 12/16/2020    Metastatic cancer to liver [facility]) [C78.7]     Pathologic lumbar vertebral fracture, initial encounter [facility] 12/13/2020    Malignant neoplasm metastatic to bone ([facility]) [C79.51]      Plan  Pain control  Recommendations per hem/onc  Recommendations per palliative  Monitor bowel function  Neurovascular checks  Medications as ordered    DVT Prophylaxis: lovenox  Diet: DIET GENERAL;  Dietary Nutrition Supplements: Standard High Calorie Oral Supplement  Code Status: DNR-CCA    PT/OT Eval Status: ordered    Dispo - discharge planning      Shelley Goel CNP

## 2020-12-31 NOTE — PROGRESS NOTES
Palliative Care Department  [phone]  Palliative Care Progress Note  Provider Mickey Michaud  [id number]  Hospital Day: 20    Referring Provider: Herlinda Dowd MD  Palliative Medicine was consulted for assistance with: Symptom management, goals of care, CODE STATUS and family support    Brief summary:  Kuldeep Aguayo is a 44 y.o. who presented to the hospital with chief complaints of back pain and was admitted 12/12/2020 with diagnosis(ses) of malignant neoplasm metastatic to bone, lung mass, pathologic fracture of vertebrae due to neoplastic disease. Patient has started workup for malignancy. ASSESSMENT/PLAN:   Pertinent hospital diagnoses:  1. Metastatic non-small cell lung cancer, squamous type   -Liver and lung biopsy positive for malignancy, pathology and cytology reviewed  -Radiation oncology following for palliative radiation to the spine  - MRI brain small extra-axial enhancing mass- meningioma vs metastasis  - bone scan shows widespread metastatic disease     2.  Pain associated with neoplasm  - oxycodone change to 15-20mg q4 hrs prn- has had 80mg in past 24 hours  - increased methadone to 10mg q8 hours, EKG no QT prolongation  - protonix IV prophylaxis  -  EKG screening normal QTc, ok for methadone  - continue neurontin to 400mg TID  -  IV morphine 4 mg to receive 15 to 30 minutes before leaving for radiation  - calcium up to 11.1, not likely cause of pain     3. GI-constipation resolved  - senna-s 2 tabs bid  - miralax 17 gm daily PRN     4.  Spinal cord stenosis secondary to tumor infiltration  -Receiving steroids  -Significant concerns with cord compression and worsening neurologic status, receiving palliative radiation per Dr. Kailey Holguin. PALLIATIVE CARE ENCOUNTER  - Capacity: At this time, Kuldeep Aguayo, Does Not have capacity for medical decision-making.   Capacity is time limited and situation/question specific  - Outcome of goals of care meeting: Consult to psych, provide advanced directives  - Code Status:   full  - Advanced directives: None  - Surrogate decision maker/Legal NOK:     Contacts:    Jacqueline Zuleta [phone]   Son Lorie Osei [phone]   Daughter Sherryle Bells [phone]  - Spiritual assessment: none  - Bereavement and grief: diagnosis and young age    - DISPO:  Nursing facility    Referrals to: none today    Subjective   Chart reviewed  Discussed with RN    Patient laying face down in bed with no clothes on with blanket over mid-section, arousable to voice and turned over in bed. Confused, attempted to discuss patient's wishes regarding continuing with radiation and immunotherapy versus hospice given his continued complaints of severe pain. Patient slipped hand under cover, stated \"oh, what's this\" and proceeded with inappropriate physical behavior. Inpatient medications reviewed: yes  Home Medications reviewed: yes    OBJECTIVE:   Prognosis: Poor    Physical Exam:  /66   Pulse 104   Temp 98 °F (36.7 °C) (Temporal)   Resp 18   Ht 5' 9\" (1.753 m)   Wt 195 lb (88.5 kg)   SpO2 97%   BMI 28.80 kg/m²   Gen:  Appears stated age, NAD  HEENT:  Normocephalic, atraumatic, mucosa dry, EOMI, sclera anicteric  Neck:  Supple, trachea midline, no JVD  Lungs:  respirations unlabored, no accessory muscle use  Heart[de-identified]  normal S1S2, regular rate and rhythm  Abd:  Soft, non tender, non distended, bowel sounds present  :  No rogers  Ext:  no edema noted on visible extremities  Skin:   erythema of right arm, and trunk  Neuro:  confused    Objective data reviewed: labs, images, records, medication use, vitals and chart    Time/Communication  Greater than 50% of time spent, total 15 minutes in counseling and coordination of care at the bedside/over the telephone regarding goals of care, symptom management, diagnosis and prognosis and see above.     Thank you for allowing Palliative Medicine to participate in the care of [place] Rose Mariia.       Provider [facility]

## 2020-12-31 NOTE — PROGRESS NOTES
Occupational Therapy  OT BEDSIDE TREATMENT NOTE      Date:2020  Patient Name: Erick North  MRN: [id number]  : 1970  Room: [address] [place] [address]-A       Pt's chart reviewed and treatment attempted, pt currently off floor. Will attempt at a later time/date.             Bette Mondragon

## 2020-12-31 NOTE — PROGRESS NOTES
Date: 2020       Patient Name: Shiv Clayton  : 1970      MRN: [id number]    Patient currently unavailable for physical therapy services as patient off the floor for treatment.    Will follow up as appropriate    Arielle Menjivar PT, DPT  [id number]

## 2020-12-31 NOTE — PROGRESS NOTES
causing him pain. Stand to sit: NT  Stand pivot: NT  Sit to stand: MaxA x 2 B knee block 50% stance (x 2 reps)   Stand to sit: MaxA x 2  Stand pivot: NT Sit to stand: Mod  Stand to sit: Mod  Stand pivot: to be determined. May be SB to w/c. Ambulation    NT   To be determined. Stair negotiation: ascended and descended  NT       ROM BUE:  See OT eval  BLE:  wfl for PROM.       Strength BUE: See OT eval   RLE: 1/5  LLE:   3/5 knee ext 3/5 hip   4-/5   Balance Sitting EOB:  SBA with TLSO on. Used BUE to support himself. Dynamic Standing:  NT  Sitting EOB:  ModA  Dynamic Standing:  only achieved 50% stance  Sitting EOB:  Ind  Dynamic Standing: Mod      Patient is confused, with garbled nonsensical speech. Pt agitated, distracted, and writhing due to pain. Max cues and hand over hand for facilitation of mobility  Sensation: Unable to assess due to cognition  Edema:  none    Patient education  Pt educated on role of PT    Patient response to education:   Pt verbalized understanding Pt demonstrated skill Pt requires further education in this area   x x x     ASSESSMENT:    Comments:  Pt received in supine agreeable to PT. Pt incontinent of large amount of urine. Pt required max cues and hand over hand facilitation for mobility to perform bed mobility and don soft TLSO. Pt performed sit to supine transfer with assist of trunk and BLEs. Pt performed 2x partial stands with bilateral knee block to remove soiled linen. Assisted with hygiene. Patient would benefit from continued skilled PT to maximize functional mobility independence. Alarm active. Treatment:  Patient practiced and was instructed in the following treatment:     Bed mobility- verbal cues for positioning and sequencing to facilitate independence   Functional transfers-Verbal cues for proper positioning and sequencing to perform transfers safely with maximum independence.        PLAN:      PLAN OF CARE:    Current Treatment Recommendations     [x] Strengthening     [x] ROM   [x] Balance Training   [x] Endurance Training   [x] Transfer Training   [] Gait Training   [] Stair Training   [] Positioning   [x] Safety and Education Training   [x] Patient/Caregiver Education   [x] HEP  [x] Other       Patient is making good progress towards established goals. Will continue with current POC.       Time in  1405  Time out  1427    Total Treatment Time  8 minutes     CPT codes:  [] Gait training 74567 0 minutes  [] Manual therapy 53583 0 minutes  [x] Therapeutic activities 74199 8 minutes  [] Therapeutic exercises 80195 0 minutes  [] Neuromuscular reeducation 63352 0 minutes    Virgilio Clement PT, DPT  [id number]

## 2020-12-31 NOTE — PROGRESS NOTES
Occupational Therapy  OT BEDSIDE TREATMENT NOTE      Date:2020  Patient Name: Sofía Pacheco  MRN: [id number]  : 1970  Room: [address] [place] [address]     Evaluating OT: Carolina Matthew Sanchez 70, OTR/L #[id number]     Referring physician: Joe Laughlin MD  AM-PAC Daily Activity Raw Score:   Recommended Adaptive Equipment: ww, AE for LE dressing and bathing, BSC      Diagnosis: Pathologic lumbar vertebral fracture with metastatic cancer        Pertinent Medical History:   Past Medical History        Past Medical History:   Diagnosis Date    Acute kidney injury ([facility] [address] 2017    Cocaine abuse ([facility] [address] 2017    Hepatitis C 1990    Heroin abuse ([facility] [address] 2017    Hyperkalemia 2017    Hypertension      Liver disease      Non-traumatic rhabdomyolysis 2017    Psychiatric problem              Precautions:  Falls, spinal , TLSO brace, Hep C , drug abuse, TSM - safety- impulsive     Home Living: Pt lives with friend in a 2 story home  with one step(s) to enter and no rail(s); bed/bath on basement level full flight with HR   Bathroom setup: tub shower on main floor and standard commode   Equipment owned: crutches  Prior Level of Function:   Released from incarceration and Independent with ADLs ,  Independent with IADLs; using no AD  for ambulation. Medication Management self  Occupation: history of      Pain Level: Pt twisting and jerking with pain with all movement  Cognition: A&O: 3/3; confused throughout flailing seated EOB. Garbled speech this date, unable to follow directions              Memory:  poor              Sequencing:  poor              Problem solving:  poor              Judgement/safety:  poor  Decreased independence this date due to confusion and pain. No safety awareness or understanding of spinal precautions.                 Functional Assessment:    Initial Eval Status  Date: 20 Treatment Status  Date: 20 Short Term Goals=LTG  Treatment frequency: PRN 2-4 x/week   Feeding Noted to be setup and ate supine in bed  Max A Mod I    Grooming SBA Dependent  setup    UB Dressing Max A for brace Dependent  To don/doff gown and don TLSO supine  Setup for brace   LB Dressing Max A  Dependent  To don brief supine in bed  Mod I with AE prn    Bathing Max A   Dependent  For LB bathing supine Min A     Toileting Able to manage urinal, max A   Dependent- hygiene Min A    Bed Mobility  Supine to sit: mod A    Sit to supine: min A  Max A x 2- supine<->sit  Educated pt on technique to increase independence. Roll- Min A    Supine to sit: mod I    Sit to supine: mod I    Functional Transfers Sit to stand: refused due to increased pain   Stand to sit:  N/t  Stand pivot: n/t  Max A x 2- sit<->stand  Cuing for body mechanics tba   Functional Mobility N/t  N/T  tba   Balance Sitting:     Static:  Min A     Dynamic:mod A   Standing: n/t Sitting:     Static:  max A    Dynamic:max A   Standing: Max A x 2     Activity Tolerance Poor limited by pain and self limiting  poor  Limited by pain and confusion Fair+   Visual/  Perceptual Glasses: yes states he needs new prescription           Safety poor  poor                Fair+ with compliance to TLSO brace wear and spinal precautions       Comments: Upon arrival pt supine in bed. Pt educated on techniques to increase independence and safety during ADL's, bed mobility, and functional transfers. At end of session pt left seated semi upright in bed, call light within reach. · Pt has made limited progress towards set goals.      · Continue with current plan of care    Treatment Time In: 2:00            Treatment Time Out: 2:25             Treatment Charges: Mins Units   Ther Ex  85828     Manual Therapy [id number]     Thera Activities 94410 15 1   ADL/Home Mgt [id number]     Neuro Re-ed 00633     Group Therapy      Orthotic manage/training  67283     Non-Billable Time     Total Timed Treatment 15 [address], [address]

## 2020-12-31 NOTE — ONCOLOGY
Pt came down today and received 300 cgy  Fraction 5 of 10 radiation treatment to the T-spine, L-spine, sacrum, and lt hip per Dr Anthony Topete in Radiation Oncology.

## 2021-01-01 VITALS
OXYGEN SATURATION: 97 % | WEIGHT: 195 LBS | SYSTOLIC BLOOD PRESSURE: 112 MMHG | TEMPERATURE: 98.9 F | RESPIRATION RATE: 20 BRPM | HEART RATE: 125 BPM | HEIGHT: 69 IN | DIASTOLIC BLOOD PRESSURE: 66 MMHG | BODY MASS INDEX: 28.88 KG/M2

## 2021-01-01 LAB
ALBUMIN SERPL-MCNC: 2.1 G/DL (ref 3.5–5.2)
ALBUMIN SERPL-MCNC: 2.4 G/DL (ref 3.5–5.2)
ALBUMIN SERPL-MCNC: 2.7 G/DL (ref 3.5–5.2)
ALBUMIN SERPL-MCNC: 2.9 G/DL (ref 3.5–5.2)
ALBUMIN SERPL-MCNC: 3 G/DL (ref 3.5–5.2)
ALP BLD-CCNC: 66 U/L (ref 40–129)
ALP BLD-CCNC: 73 U/L (ref 40–129)
ALP BLD-CCNC: 76 U/L (ref 40–129)
ALP BLD-CCNC: 79 U/L (ref 40–129)
ALP BLD-CCNC: 79 U/L (ref 40–129)
ALT SERPL-CCNC: 56 U/L (ref 0–40)
ALT SERPL-CCNC: 60 U/L (ref 0–40)
ALT SERPL-CCNC: 66 U/L (ref 0–40)
ALT SERPL-CCNC: 74 U/L (ref 0–40)
ALT SERPL-CCNC: 84 U/L (ref 0–40)
ANION GAP SERPL CALCULATED.3IONS-SCNC: 10 MMOL/L (ref 7–16)
ANION GAP SERPL CALCULATED.3IONS-SCNC: 11 MMOL/L (ref 7–16)
ANION GAP SERPL CALCULATED.3IONS-SCNC: 6 MMOL/L (ref 7–16)
ANION GAP SERPL CALCULATED.3IONS-SCNC: 9 MMOL/L (ref 7–16)
ANION GAP SERPL CALCULATED.3IONS-SCNC: 9 MMOL/L (ref 7–16)
AST SERPL-CCNC: 108 U/L (ref 0–39)
AST SERPL-CCNC: 30 U/L (ref 0–39)
AST SERPL-CCNC: 31 U/L (ref 0–39)
AST SERPL-CCNC: 32 U/L (ref 0–39)
AST SERPL-CCNC: 67 U/L (ref 0–39)
BASOPHILS ABSOLUTE: 0 E9/L (ref 0–0.2)
BASOPHILS ABSOLUTE: 0 E9/L (ref 0–0.2)
BASOPHILS ABSOLUTE: 0.06 E9/L (ref 0–0.2)
BASOPHILS ABSOLUTE: 0.06 E9/L (ref 0–0.2)
BASOPHILS ABSOLUTE: 0.1 E9/L (ref 0–0.2)
BASOPHILS RELATIVE PERCENT: 0 % (ref 0–2)
BASOPHILS RELATIVE PERCENT: 0 % (ref 0–2)
BASOPHILS RELATIVE PERCENT: 0.4 % (ref 0–2)
BASOPHILS RELATIVE PERCENT: 0.5 % (ref 0–2)
BASOPHILS RELATIVE PERCENT: 0.6 % (ref 0–2)
BILIRUB SERPL-MCNC: 0.6 MG/DL (ref 0–1.2)
BILIRUB SERPL-MCNC: 0.7 MG/DL (ref 0–1.2)
BILIRUB SERPL-MCNC: 0.7 MG/DL (ref 0–1.2)
BILIRUB SERPL-MCNC: 0.8 MG/DL (ref 0–1.2)
BILIRUB SERPL-MCNC: 1.7 MG/DL (ref 0–1.2)
BUN BLDV-MCNC: 44 MG/DL (ref 6–20)
BUN BLDV-MCNC: 47 MG/DL (ref 6–20)
BUN BLDV-MCNC: 49 MG/DL (ref 6–20)
BUN BLDV-MCNC: 55 MG/DL (ref 6–20)
BUN BLDV-MCNC: 65 MG/DL (ref 6–20)
CALCIUM SERPL-MCNC: 11.4 MG/DL (ref 8.6–10.2)
CALCIUM SERPL-MCNC: 11.4 MG/DL (ref 8.6–10.2)
CALCIUM SERPL-MCNC: 11.5 MG/DL (ref 8.6–10.2)
CALCIUM SERPL-MCNC: 11.9 MG/DL (ref 8.6–10.2)
CALCIUM SERPL-MCNC: 11.9 MG/DL (ref 8.6–10.2)
CHLORIDE BLD-SCNC: 101 MMOL/L (ref 98–107)
CHLORIDE BLD-SCNC: 109 MMOL/L (ref 98–107)
CHLORIDE BLD-SCNC: 111 MMOL/L (ref 98–107)
CHLORIDE BLD-SCNC: 117 MMOL/L (ref 98–107)
CHLORIDE BLD-SCNC: 119 MMOL/L (ref 98–107)
CO2: 29 MMOL/L (ref 22–29)
CO2: 31 MMOL/L (ref 22–29)
CO2: 33 MMOL/L (ref 22–29)
CO2: 34 MMOL/L (ref 22–29)
CO2: 35 MMOL/L (ref 22–29)
CREAT SERPL-MCNC: 0.8 MG/DL (ref 0.7–1.2)
CREAT SERPL-MCNC: 0.9 MG/DL (ref 0.7–1.2)
CREAT SERPL-MCNC: 0.9 MG/DL (ref 0.7–1.2)
CREAT SERPL-MCNC: 1 MG/DL (ref 0.7–1.2)
CREAT SERPL-MCNC: 1.1 MG/DL (ref 0.7–1.2)
DOHLE BODIES: ABNORMAL
EKG ATRIAL RATE: 119 BPM
EKG P AXIS: 71 DEGREES
EKG P-R INTERVAL: 130 MS
EKG Q-T INTERVAL: 316 MS
EKG QRS DURATION: 86 MS
EKG QTC CALCULATION (BAZETT): 444 MS
EKG R AXIS: 67 DEGREES
EKG T AXIS: 93 DEGREES
EKG VENTRICULAR RATE: 119 BPM
EOSINOPHILS ABSOLUTE: 0 E9/L (ref 0.05–0.5)
EOSINOPHILS ABSOLUTE: 0.03 E9/L (ref 0.05–0.5)
EOSINOPHILS RELATIVE PERCENT: 0 % (ref 0–6)
EOSINOPHILS RELATIVE PERCENT: 0.2 % (ref 0–6)
GFR AFRICAN AMERICAN: >60
GFR NON-AFRICAN AMERICAN: >60 ML/MIN/1.73
GLUCOSE BLD-MCNC: 126 MG/DL (ref 74–99)
GLUCOSE BLD-MCNC: 129 MG/DL (ref 74–99)
GLUCOSE BLD-MCNC: 135 MG/DL (ref 74–99)
GLUCOSE BLD-MCNC: 157 MG/DL (ref 74–99)
GLUCOSE BLD-MCNC: 92 MG/DL (ref 74–99)
HCT VFR BLD CALC: 39 % (ref 37–54)
HCT VFR BLD CALC: 39.4 % (ref 37–54)
HCT VFR BLD CALC: 40.2 % (ref 37–54)
HCT VFR BLD CALC: 41.1 % (ref 37–54)
HCT VFR BLD CALC: 43.1 % (ref 37–54)
HEMOGLOBIN: 12.1 G/DL (ref 12.5–16.5)
HEMOGLOBIN: 12.2 G/DL (ref 12.5–16.5)
HEMOGLOBIN: 12.4 G/DL (ref 12.5–16.5)
HEMOGLOBIN: 12.5 G/DL (ref 12.5–16.5)
HEMOGLOBIN: 13.2 G/DL (ref 12.5–16.5)
HYPOCHROMIA: ABNORMAL
IMMATURE GRANULOCYTES #: 0.08 E9/L
IMMATURE GRANULOCYTES #: 0.13 E9/L
IMMATURE GRANULOCYTES #: 0.22 E9/L
IMMATURE GRANULOCYTES #: 0.25 E9/L
IMMATURE GRANULOCYTES #: 0.47 E9/L
IMMATURE GRANULOCYTES %: 0.8 % (ref 0–5)
IMMATURE GRANULOCYTES %: 1 % (ref 0–5)
IMMATURE GRANULOCYTES %: 1.6 % (ref 0–5)
IMMATURE GRANULOCYTES %: 2.1 % (ref 0–5)
IMMATURE GRANULOCYTES %: 2.8 % (ref 0–5)
LYMPHOCYTES ABSOLUTE: 0.11 E9/L (ref 1.5–4)
LYMPHOCYTES ABSOLUTE: 0.14 E9/L (ref 1.5–4)
LYMPHOCYTES ABSOLUTE: 0.17 E9/L (ref 1.5–4)
LYMPHOCYTES ABSOLUTE: 0.18 E9/L (ref 1.5–4)
LYMPHOCYTES ABSOLUTE: 0.18 E9/L (ref 1.5–4)
LYMPHOCYTES RELATIVE PERCENT: 1 % (ref 20–42)
LYMPHOCYTES RELATIVE PERCENT: 1 % (ref 20–42)
LYMPHOCYTES RELATIVE PERCENT: 1.1 % (ref 20–42)
LYMPHOCYTES RELATIVE PERCENT: 1.3 % (ref 20–42)
LYMPHOCYTES RELATIVE PERCENT: 1.4 % (ref 20–42)
MCH RBC QN AUTO: 30.7 PG (ref 26–35)
MCH RBC QN AUTO: 30.8 PG (ref 26–35)
MCH RBC QN AUTO: 31.2 PG (ref 26–35)
MCH RBC QN AUTO: 31.2 PG (ref 26–35)
MCH RBC QN AUTO: 31.4 PG (ref 26–35)
MCHC RBC AUTO-ENTMCNC: 30.1 % (ref 32–34.5)
MCHC RBC AUTO-ENTMCNC: 30.4 % (ref 32–34.5)
MCHC RBC AUTO-ENTMCNC: 30.6 % (ref 32–34.5)
MCHC RBC AUTO-ENTMCNC: 31 % (ref 32–34.5)
MCHC RBC AUTO-ENTMCNC: 31.8 % (ref 32–34.5)
MCV RBC AUTO: 100.2 FL (ref 80–99.9)
MCV RBC AUTO: 101.3 FL (ref 80–99.9)
MCV RBC AUTO: 102.3 FL (ref 80–99.9)
MCV RBC AUTO: 102.5 FL (ref 80–99.9)
MCV RBC AUTO: 98 FL (ref 80–99.9)
MONOCYTES ABSOLUTE: 0.4 E9/L (ref 0.1–0.95)
MONOCYTES ABSOLUTE: 0.48 E9/L (ref 0.1–0.95)
MONOCYTES ABSOLUTE: 0.54 E9/L (ref 0.1–0.95)
MONOCYTES ABSOLUTE: 0.66 E9/L (ref 0.1–0.95)
MONOCYTES ABSOLUTE: 0.74 E9/L (ref 0.1–0.95)
MONOCYTES RELATIVE PERCENT: 3.6 % (ref 2–12)
MONOCYTES RELATIVE PERCENT: 3.8 % (ref 2–12)
MONOCYTES RELATIVE PERCENT: 4.4 % (ref 2–12)
MONOCYTES RELATIVE PERCENT: 4.6 % (ref 2–12)
MONOCYTES RELATIVE PERCENT: 4.7 % (ref 2–12)
NEUTROPHILS ABSOLUTE: 10.06 E9/L (ref 1.8–7.3)
NEUTROPHILS ABSOLUTE: 10.74 E9/L (ref 1.8–7.3)
NEUTROPHILS ABSOLUTE: 12.6 E9/L (ref 1.8–7.3)
NEUTROPHILS ABSOLUTE: 12.96 E9/L (ref 1.8–7.3)
NEUTROPHILS ABSOLUTE: 15.34 E9/L (ref 1.8–7.3)
NEUTROPHILS RELATIVE PERCENT: 91.1 % (ref 43–80)
NEUTROPHILS RELATIVE PERCENT: 91.4 % (ref 43–80)
NEUTROPHILS RELATIVE PERCENT: 92.1 % (ref 43–80)
NEUTROPHILS RELATIVE PERCENT: 94.1 % (ref 43–80)
NEUTROPHILS RELATIVE PERCENT: 94.4 % (ref 43–80)
OVALOCYTES: ABNORMAL
OVALOCYTES: ABNORMAL
PDW BLD-RTO: 13.6 FL (ref 11.5–15)
PDW BLD-RTO: 14.1 FL (ref 11.5–15)
PDW BLD-RTO: 14.5 FL (ref 11.5–15)
PLATELET # BLD: 127 E9/L (ref 130–450)
PLATELET # BLD: 148 E9/L (ref 130–450)
PLATELET # BLD: 167 E9/L (ref 130–450)
PLATELET # BLD: 209 E9/L (ref 130–450)
PLATELET # BLD: 217 E9/L (ref 130–450)
PMV BLD AUTO: 10.4 FL (ref 7–12)
PMV BLD AUTO: 10.5 FL (ref 7–12)
PMV BLD AUTO: 10.8 FL (ref 7–12)
PMV BLD AUTO: 10.9 FL (ref 7–12)
PMV BLD AUTO: 11.2 FL (ref 7–12)
POIKILOCYTES: ABNORMAL
POLYCHROMASIA: ABNORMAL
POLYCHROMASIA: ABNORMAL
POTASSIUM REFLEX MAGNESIUM: 3.8 MMOL/L (ref 3.5–5)
POTASSIUM REFLEX MAGNESIUM: 3.9 MMOL/L (ref 3.5–5)
POTASSIUM REFLEX MAGNESIUM: 4 MMOL/L (ref 3.5–5)
POTASSIUM REFLEX MAGNESIUM: 4.1 MMOL/L (ref 3.5–5)
POTASSIUM REFLEX MAGNESIUM: 4.1 MMOL/L (ref 3.5–5)
RBC # BLD: 3.89 E12/L (ref 3.8–5.8)
RBC # BLD: 3.93 E12/L (ref 3.8–5.8)
RBC # BLD: 3.98 E12/L (ref 3.8–5.8)
RBC # BLD: 4.01 E12/L (ref 3.8–5.8)
RBC # BLD: 4.3 E12/L (ref 3.8–5.8)
RBC # BLD: NORMAL 10*6/UL
RBC # BLD: NORMAL 10*6/UL
SODIUM BLD-SCNC: 143 MMOL/L (ref 132–146)
SODIUM BLD-SCNC: 153 MMOL/L (ref 132–146)
SODIUM BLD-SCNC: 154 MMOL/L (ref 132–146)
SODIUM BLD-SCNC: 155 MMOL/L (ref 132–146)
SODIUM BLD-SCNC: 159 MMOL/L (ref 132–146)
TEAR DROP CELLS: ABNORMAL
TOTAL PROTEIN: 6.6 G/DL (ref 6.4–8.3)
TOTAL PROTEIN: 7 G/DL (ref 6.4–8.3)
TOTAL PROTEIN: 7 G/DL (ref 6.4–8.3)
TOTAL PROTEIN: 7.1 G/DL (ref 6.4–8.3)
TOTAL PROTEIN: 7.2 G/DL (ref 6.4–8.3)
VACUOLATED NEUTROPHILS: ABNORMAL
VACUOLATED NEUTROPHILS: ABNORMAL
WBC # BLD: 10.7 E9/L (ref 4.5–11.5)
WBC # BLD: 11.8 E9/L (ref 4.5–11.5)
WBC # BLD: 13.4 E9/L (ref 4.5–11.5)
WBC # BLD: 14.1 E9/L (ref 4.5–11.5)
WBC # BLD: 16.8 E9/L (ref 4.5–11.5)

## 2021-01-01 PROCEDURE — 93010 ELECTROCARDIOGRAM REPORT: CPT | Performed by: INTERNAL MEDICINE

## 2021-01-01 PROCEDURE — 2580000003 HC RX 258: Performed by: INTERNAL MEDICINE

## 2021-01-01 PROCEDURE — 6370000000 HC RX 637 (ALT 250 FOR IP): Performed by: FAMILY MEDICINE

## 2021-01-01 PROCEDURE — 6360000002 HC RX W HCPCS: Performed by: PHYSICIAN ASSISTANT

## 2021-01-01 PROCEDURE — 6360000002 HC RX W HCPCS: Performed by: INTERNAL MEDICINE

## 2021-01-01 PROCEDURE — 2500000003 HC RX 250 WO HCPCS: Performed by: CLINICAL NURSE SPECIALIST

## 2021-01-01 PROCEDURE — 6370000000 HC RX 637 (ALT 250 FOR IP): Performed by: INTERNAL MEDICINE

## 2021-01-01 PROCEDURE — 36415 COLL VENOUS BLD VENIPUNCTURE: CPT

## 2021-01-01 PROCEDURE — 85025 COMPLETE CBC W/AUTO DIFF WBC: CPT

## 2021-01-01 PROCEDURE — 6370000000 HC RX 637 (ALT 250 FOR IP): Performed by: PHYSICIAN ASSISTANT

## 2021-01-01 PROCEDURE — 80053 COMPREHEN METABOLIC PANEL: CPT

## 2021-01-01 PROCEDURE — 6360000002 HC RX W HCPCS: Performed by: CLINICAL NURSE SPECIALIST

## 2021-01-01 PROCEDURE — 99232 SBSQ HOSP IP/OBS MODERATE 35: CPT | Performed by: PHYSICIAN ASSISTANT

## 2021-01-01 PROCEDURE — 99232 SBSQ HOSP IP/OBS MODERATE 35: CPT | Performed by: FAMILY MEDICINE

## 2021-01-01 PROCEDURE — 6370000000 HC RX 637 (ALT 250 FOR IP): Performed by: PSYCHIATRY & NEUROLOGY

## 2021-01-01 PROCEDURE — 93005 ELECTROCARDIOGRAM TRACING: CPT | Performed by: CLINICAL NURSE SPECIALIST

## 2021-01-01 PROCEDURE — 1200000000 HC SEMI PRIVATE

## 2021-01-01 PROCEDURE — 2580000003 HC RX 258: Performed by: CLINICAL NURSE SPECIALIST

## 2021-01-01 PROCEDURE — 2580000003 HC RX 258: Performed by: NURSE PRACTITIONER

## 2021-01-01 PROCEDURE — 99233 SBSQ HOSP IP/OBS HIGH 50: CPT | Performed by: INTERNAL MEDICINE

## 2021-01-01 RX ORDER — ATROPINE SULFATE 10 MG/ML
2 SOLUTION/ DROPS OPHTHALMIC EVERY 4 HOURS PRN
Status: DISCONTINUED | OUTPATIENT
Start: 2021-01-01 | End: 2021-01-01 | Stop reason: HOSPADM

## 2021-01-01 RX ORDER — DEXTROSE, SODIUM CHLORIDE, AND POTASSIUM CHLORIDE 5; .45; .15 G/100ML; G/100ML; G/100ML
INJECTION INTRAVENOUS CONTINUOUS
Status: DISCONTINUED | OUTPATIENT
Start: 2021-01-01 | End: 2021-01-01

## 2021-01-01 RX ORDER — LORAZEPAM 2 MG/ML
2 CONCENTRATE ORAL EVERY 4 HOURS PRN
Status: DISCONTINUED | OUTPATIENT
Start: 2021-01-01 | End: 2021-01-01 | Stop reason: HOSPADM

## 2021-01-01 RX ORDER — DEXAMETHASONE SODIUM PHOSPHATE 4 MG/ML
4 INJECTION, SOLUTION INTRA-ARTICULAR; INTRALESIONAL; INTRAMUSCULAR; INTRAVENOUS; SOFT TISSUE EVERY 12 HOURS
Status: DISCONTINUED | OUTPATIENT
Start: 2021-01-01 | End: 2021-01-01

## 2021-01-01 RX ORDER — MORPHINE SULFATE 20 MG/ML
15 SOLUTION ORAL
Status: DISCONTINUED | OUTPATIENT
Start: 2021-01-01 | End: 2021-01-01 | Stop reason: HOSPADM

## 2021-01-01 RX ORDER — MORPHINE SULFATE 4 MG/ML
5 INJECTION, SOLUTION INTRAMUSCULAR; INTRAVENOUS
Status: DISCONTINUED | OUTPATIENT
Start: 2021-01-01 | End: 2021-01-01

## 2021-01-01 RX ORDER — ONDANSETRON 4 MG/1
4 TABLET, ORALLY DISINTEGRATING ORAL EVERY 6 HOURS PRN
Status: DISCONTINUED | OUTPATIENT
Start: 2021-01-01 | End: 2021-01-01 | Stop reason: HOSPADM

## 2021-01-01 RX ORDER — DEXTROSE AND SODIUM CHLORIDE 5; .45 G/100ML; G/100ML
INJECTION, SOLUTION INTRAVENOUS CONTINUOUS
Status: DISCONTINUED | OUTPATIENT
Start: 2021-01-01 | End: 2021-01-01

## 2021-01-01 RX ORDER — SODIUM CHLORIDE 9 MG/ML
INJECTION, SOLUTION INTRAVENOUS CONTINUOUS
Status: DISCONTINUED | OUTPATIENT
Start: 2021-01-01 | End: 2021-01-01 | Stop reason: ALTCHOICE

## 2021-01-01 RX ORDER — MORPHINE SULFATE 4 MG/ML
4 INJECTION, SOLUTION INTRAMUSCULAR; INTRAVENOUS
Status: DISCONTINUED | OUTPATIENT
Start: 2021-01-01 | End: 2021-01-01

## 2021-01-01 RX ORDER — SODIUM CHLORIDE 450 MG/100ML
INJECTION, SOLUTION INTRAVENOUS CONTINUOUS
Status: DISCONTINUED | OUTPATIENT
Start: 2021-01-01 | End: 2021-01-01

## 2021-01-01 RX ORDER — DEXAMETHASONE 1 MG
4 TABLET ORAL EVERY 12 HOURS SCHEDULED
Status: DISCONTINUED | OUTPATIENT
Start: 2021-01-01 | End: 2021-01-01

## 2021-01-01 RX ORDER — MORPHINE SULFATE 2 MG/ML
2 INJECTION, SOLUTION INTRAMUSCULAR; INTRAVENOUS
Status: DISCONTINUED | OUTPATIENT
Start: 2021-01-01 | End: 2021-01-01

## 2021-01-01 RX ORDER — MORPHINE SULFATE 2 MG/ML
1 INJECTION, SOLUTION INTRAMUSCULAR; INTRAVENOUS
Status: DISCONTINUED | OUTPATIENT
Start: 2021-01-01 | End: 2021-01-01

## 2021-01-01 RX ORDER — DEXTROSE MONOHYDRATE 50 MG/ML
INJECTION, SOLUTION INTRAVENOUS CONTINUOUS
Status: DISCONTINUED | OUTPATIENT
Start: 2021-01-01 | End: 2021-01-01

## 2021-01-01 RX ADMIN — POTASSIUM BICARBONATE 20 MEQ: 782 TABLET, EFFERVESCENT ORAL at 08:26

## 2021-01-01 RX ADMIN — DIVALPROEX SODIUM 125 MG: 125 CAPSULE, COATED PELLETS ORAL at 08:24

## 2021-01-01 RX ADMIN — DEXAMETHASONE SODIUM PHOSPHATE 4 MG: 4 INJECTION, SOLUTION INTRAMUSCULAR; INTRAVENOUS at 17:08

## 2021-01-01 RX ADMIN — POTASSIUM CHLORIDE, DEXTROSE MONOHYDRATE AND SODIUM CHLORIDE: 150; 5; 450 INJECTION, SOLUTION INTRAVENOUS at 11:50

## 2021-01-01 RX ADMIN — GABAPENTIN 400 MG: 400 CAPSULE ORAL at 20:50

## 2021-01-01 RX ADMIN — ATROPINE SULFATE 2 DROP: 10 SOLUTION/ DROPS OPHTHALMIC at 15:00

## 2021-01-01 RX ADMIN — DIVALPROEX SODIUM 125 MG: 125 CAPSULE, COATED PELLETS ORAL at 22:08

## 2021-01-01 RX ADMIN — HYDRALAZINE HYDROCHLORIDE 25 MG: 50 TABLET, FILM COATED ORAL at 05:42

## 2021-01-01 RX ADMIN — POTASSIUM BICARBONATE 20 MEQ: 782 TABLET, EFFERVESCENT ORAL at 20:50

## 2021-01-01 RX ADMIN — OXYCODONE HYDROCHLORIDE 15 MG: 15 TABLET ORAL at 10:36

## 2021-01-01 RX ADMIN — FOLIC ACID 1 MG: 1 TABLET ORAL at 08:24

## 2021-01-01 RX ADMIN — FUROSEMIDE 20 MG: 20 TABLET ORAL at 08:24

## 2021-01-01 RX ADMIN — DEXAMETHASONE 4 MG: 4 TABLET ORAL at 09:36

## 2021-01-01 RX ADMIN — LORAZEPAM 1 MG: 1 TABLET ORAL at 17:07

## 2021-01-01 RX ADMIN — MORPHINE SULFATE 15 MG: 100 SOLUTION ORAL at 17:39

## 2021-01-01 RX ADMIN — DEXAMETHASONE 4 MG: 4 TABLET ORAL at 22:08

## 2021-01-01 RX ADMIN — DEXAMETHASONE SODIUM PHOSPHATE 4 MG: 4 INJECTION, SOLUTION INTRAMUSCULAR; INTRAVENOUS at 04:09

## 2021-01-01 RX ADMIN — METHADONE HYDROCHLORIDE 10 MG: 10 TABLET ORAL at 05:42

## 2021-01-01 RX ADMIN — METHADONE HYDROCHLORIDE 10 MG: 10 TABLET ORAL at 22:10

## 2021-01-01 RX ADMIN — SODIUM CHLORIDE: 4.5 INJECTION, SOLUTION INTRAVENOUS at 15:28

## 2021-01-01 RX ADMIN — METHADONE HYDROCHLORIDE 10 MG: 10 TABLET ORAL at 20:50

## 2021-01-01 RX ADMIN — DEXAMETHASONE 4 MG: 4 TABLET ORAL at 05:42

## 2021-01-01 RX ADMIN — METHADONE HYDROCHLORIDE 10 MG: 10 TABLET ORAL at 06:46

## 2021-01-01 RX ADMIN — GABAPENTIN 400 MG: 400 CAPSULE ORAL at 16:23

## 2021-01-01 RX ADMIN — DEXAMETHASONE 4 MG: 4 TABLET ORAL at 08:26

## 2021-01-01 RX ADMIN — PANTOPRAZOLE SODIUM 40 MG: 40 TABLET, DELAYED RELEASE ORAL at 06:44

## 2021-01-01 RX ADMIN — GABAPENTIN 400 MG: 400 CAPSULE ORAL at 08:24

## 2021-01-01 RX ADMIN — POTASSIUM CHLORIDE, DEXTROSE MONOHYDRATE AND SODIUM CHLORIDE: 150; 5; 450 INJECTION, SOLUTION INTRAVENOUS at 22:21

## 2021-01-01 RX ADMIN — ENOXAPARIN SODIUM 40 MG: 40 INJECTION SUBCUTANEOUS at 08:24

## 2021-01-01 RX ADMIN — OXYCODONE HYDROCHLORIDE 20 MG: 10 TABLET ORAL at 15:19

## 2021-01-01 RX ADMIN — GABAPENTIN 400 MG: 400 CAPSULE ORAL at 14:37

## 2021-01-01 RX ADMIN — GABAPENTIN 400 MG: 400 CAPSULE ORAL at 23:14

## 2021-01-01 RX ADMIN — DEXAMETHASONE 4 MG: 4 TABLET ORAL at 23:12

## 2021-01-01 RX ADMIN — Medication 10 ML: at 09:03

## 2021-01-01 RX ADMIN — MORPHINE SULFATE 15 MG: 100 SOLUTION ORAL at 18:26

## 2021-01-01 RX ADMIN — DEXAMETHASONE 4 MG: 4 TABLET ORAL at 11:49

## 2021-01-01 RX ADMIN — LORAZEPAM 1 MG: 1 TABLET ORAL at 09:37

## 2021-01-01 RX ADMIN — LORAZEPAM 1 MG: 1 TABLET ORAL at 02:24

## 2021-01-01 RX ADMIN — LORAZEPAM 1 MG: 2 INJECTION INTRAMUSCULAR; INTRAVENOUS at 01:16

## 2021-01-01 RX ADMIN — POTASSIUM BICARBONATE 20 MEQ: 782 TABLET, EFFERVESCENT ORAL at 09:37

## 2021-01-01 RX ADMIN — CARVEDILOL 25 MG: 25 TABLET, FILM COATED ORAL at 09:37

## 2021-01-01 RX ADMIN — METHADONE HYDROCHLORIDE 10 MG: 10 TABLET ORAL at 06:31

## 2021-01-01 RX ADMIN — DEXAMETHASONE 4 MG: 4 TABLET ORAL at 20:50

## 2021-01-01 RX ADMIN — MORPHINE SULFATE 5 MG: 4 INJECTION, SOLUTION INTRAMUSCULAR; INTRAVENOUS at 18:55

## 2021-01-01 RX ADMIN — PANTOPRAZOLE SODIUM 40 MG: 40 TABLET, DELAYED RELEASE ORAL at 06:46

## 2021-01-01 RX ADMIN — DOCUSATE SODIUM 50 MG AND SENNOSIDES 8.6 MG 2 TABLET: 8.6; 5 TABLET, FILM COATED ORAL at 08:25

## 2021-01-01 RX ADMIN — Medication 3 MG: at 23:15

## 2021-01-01 RX ADMIN — MORPHINE SULFATE 4 MG: 4 INJECTION, SOLUTION INTRAMUSCULAR; INTRAVENOUS at 09:36

## 2021-01-01 RX ADMIN — METHADONE HYDROCHLORIDE 10 MG: 10 TABLET ORAL at 16:23

## 2021-01-01 RX ADMIN — MIRTAZAPINE 15 MG: 15 TABLET, FILM COATED ORAL at 00:56

## 2021-01-01 RX ADMIN — Medication 3 MG: at 20:50

## 2021-01-01 RX ADMIN — ENOXAPARIN SODIUM 40 MG: 40 INJECTION SUBCUTANEOUS at 09:38

## 2021-01-01 RX ADMIN — METHADONE HYDROCHLORIDE 10 MG: 10 TABLET ORAL at 14:38

## 2021-01-01 RX ADMIN — LORAZEPAM 2 MG: 2 SOLUTION, CONCENTRATE ORAL at 18:26

## 2021-01-01 RX ADMIN — Medication 3 MG: at 22:09

## 2021-01-01 RX ADMIN — MORPHINE SULFATE 2 MG: 2 INJECTION, SOLUTION INTRAMUSCULAR; INTRAVENOUS at 15:34

## 2021-01-01 RX ADMIN — GABAPENTIN 400 MG: 400 CAPSULE ORAL at 13:40

## 2021-01-01 RX ADMIN — DIVALPROEX SODIUM 125 MG: 125 CAPSULE, COATED PELLETS ORAL at 09:36

## 2021-01-01 RX ADMIN — GABAPENTIN 400 MG: 400 CAPSULE ORAL at 09:37

## 2021-01-01 RX ADMIN — METHADONE HYDROCHLORIDE 10 MG: 10 TABLET ORAL at 14:43

## 2021-01-01 RX ADMIN — DOCUSATE SODIUM 50 MG AND SENNOSIDES 8.6 MG 2 TABLET: 8.6; 5 TABLET, FILM COATED ORAL at 09:37

## 2021-01-01 RX ADMIN — GABAPENTIN 400 MG: 400 CAPSULE ORAL at 09:36

## 2021-01-01 RX ADMIN — GABAPENTIN 400 MG: 400 CAPSULE ORAL at 14:43

## 2021-01-01 RX ADMIN — ATROPINE SULFATE 2 DROP: 10 SOLUTION/ DROPS OPHTHALMIC at 16:35

## 2021-01-01 RX ADMIN — POTASSIUM BICARBONATE 20 MEQ: 782 TABLET, EFFERVESCENT ORAL at 23:21

## 2021-01-01 RX ADMIN — HYDRALAZINE HYDROCHLORIDE 25 MG: 50 TABLET, FILM COATED ORAL at 14:43

## 2021-01-01 RX ADMIN — FOLIC ACID 1 MG: 1 TABLET ORAL at 09:37

## 2021-01-01 RX ADMIN — OXYCODONE HYDROCHLORIDE 20 MG: 10 TABLET ORAL at 09:30

## 2021-01-01 RX ADMIN — LORAZEPAM 1 MG: 1 TABLET ORAL at 09:30

## 2021-01-01 RX ADMIN — METHADONE HYDROCHLORIDE 10 MG: 10 TABLET ORAL at 23:17

## 2021-01-01 RX ADMIN — DEXTROSE MONOHYDRATE: 50 INJECTION, SOLUTION INTRAVENOUS at 09:39

## 2021-01-01 RX ADMIN — MORPHINE SULFATE 15 MG: 100 SOLUTION ORAL at 16:32

## 2021-01-01 RX ADMIN — DIVALPROEX SODIUM 125 MG: 125 CAPSULE, COATED PELLETS ORAL at 23:14

## 2021-01-01 RX ADMIN — OXYCODONE HYDROCHLORIDE 20 MG: 10 TABLET ORAL at 02:24

## 2021-01-01 RX ADMIN — LORAZEPAM 1 MG: 1 TABLET ORAL at 10:36

## 2021-01-01 RX ADMIN — PANTOPRAZOLE SODIUM 40 MG: 40 TABLET, DELAYED RELEASE ORAL at 05:42

## 2021-01-01 RX ADMIN — FUROSEMIDE 20 MG: 20 TABLET ORAL at 09:36

## 2021-01-01 RX ADMIN — DIVALPROEX SODIUM 125 MG: 125 CAPSULE, COATED PELLETS ORAL at 09:37

## 2021-01-01 RX ADMIN — MORPHINE SULFATE 5 MG: 4 INJECTION, SOLUTION INTRAMUSCULAR; INTRAVENOUS at 04:09

## 2021-01-01 RX ADMIN — LORAZEPAM 1 MG: 1 TABLET ORAL at 05:42

## 2021-01-01 RX ADMIN — LORAZEPAM 2 MG: 2 SOLUTION, CONCENTRATE ORAL at 14:30

## 2021-01-01 RX ADMIN — METHADONE HYDROCHLORIDE 10 MG: 10 TABLET ORAL at 13:40

## 2021-01-01 RX ADMIN — HYDRALAZINE HYDROCHLORIDE 25 MG: 50 TABLET, FILM COATED ORAL at 20:51

## 2021-01-01 RX ADMIN — METHADONE HYDROCHLORIDE 10 MG: 10 TABLET ORAL at 06:44

## 2021-01-01 RX ADMIN — GABAPENTIN 400 MG: 400 CAPSULE ORAL at 09:30

## 2021-01-01 RX ADMIN — GABAPENTIN 400 MG: 400 CAPSULE ORAL at 22:09

## 2021-01-01 RX ADMIN — MORPHINE SULFATE 15 MG: 100 SOLUTION ORAL at 14:32

## 2021-01-01 RX ADMIN — HYDRALAZINE HYDROCHLORIDE 25 MG: 50 TABLET, FILM COATED ORAL at 06:44

## 2021-01-01 RX ADMIN — MIRTAZAPINE 15 MG: 15 TABLET, FILM COATED ORAL at 23:17

## 2021-01-01 RX ADMIN — DIVALPROEX SODIUM 125 MG: 125 CAPSULE, COATED PELLETS ORAL at 20:50

## 2021-01-01 RX ADMIN — MIRTAZAPINE 15 MG: 15 TABLET, FILM COATED ORAL at 22:08

## 2021-01-01 RX ADMIN — CARVEDILOL 25 MG: 25 TABLET, FILM COATED ORAL at 08:24

## 2021-01-01 RX ADMIN — Medication 10 ML: at 09:39

## 2021-01-01 ASSESSMENT — PAIN SCALES - GENERAL
PAINLEVEL_OUTOF10: 10
PAINLEVEL_OUTOF10: 10
PAINLEVEL_OUTOF10: 9
PAINLEVEL_OUTOF10: 9
PAINLEVEL_OUTOF10: 0
PAINLEVEL_OUTOF10: 10
PAINLEVEL_OUTOF10: 3
PAINLEVEL_OUTOF10: 10
PAINLEVEL_OUTOF10: 7
PAINLEVEL_OUTOF10: 10
PAINLEVEL_OUTOF10: 9
PAINLEVEL_OUTOF10: 8
PAINLEVEL_OUTOF10: 4
PAINLEVEL_OUTOF10: 10
PAINLEVEL_OUTOF10: 0
PAINLEVEL_OUTOF10: 0

## 2021-01-01 ASSESSMENT — PAIN DESCRIPTION - ONSET
ONSET: ON-GOING
ONSET: ON-GOING

## 2021-01-01 ASSESSMENT — PAIN DESCRIPTION - LOCATION
LOCATION: BACK
LOCATION: BACK

## 2021-01-01 ASSESSMENT — PAIN DESCRIPTION - FREQUENCY
FREQUENCY: CONTINUOUS

## 2021-01-01 ASSESSMENT — PAIN DESCRIPTION - PAIN TYPE
TYPE: ACUTE PAIN;CHRONIC PAIN
TYPE: CHRONIC PAIN
TYPE: ACUTE PAIN;CHRONIC PAIN
TYPE: ACUTE PAIN

## 2021-01-01 ASSESSMENT — PAIN DESCRIPTION - DESCRIPTORS
DESCRIPTORS: ACHING;CONSTANT;DISCOMFORT
DESCRIPTORS: CONSTANT;DISCOMFORT;SORE
DESCRIPTORS: ACHING;CONSTANT;DISCOMFORT
DESCRIPTORS: ACHING;DISCOMFORT;SORE

## 2021-01-01 ASSESSMENT — PAIN DESCRIPTION - PROGRESSION
CLINICAL_PROGRESSION: GRADUALLY WORSENING
CLINICAL_PROGRESSION: NOT CHANGED

## 2021-01-01 ASSESSMENT — PAIN - FUNCTIONAL ASSESSMENT: PAIN_FUNCTIONAL_ASSESSMENT: PREVENTS OR INTERFERES WITH MANY ACTIVE NOT PASSIVE ACTIVITIES

## 2021-01-01 ASSESSMENT — PAIN DESCRIPTION - ORIENTATION: ORIENTATION: LOWER

## 2021-01-01 NOTE — PROGRESS NOTES
Hematology/Oncology Inpatient f/up:      Reason for Visit:   Metastatic NSCLC. Subjective: The patient is agitated. Back pain had improved. Physical Exam:  BP 91/62   Pulse 91   Temp 97 °F (36.1 °C) (Temporal)   Resp 16   Ht 5' 9\" (1.753 m)   Wt 195 lb (88.5 kg)   SpO2 92%   BMI 28.80 kg/m²   GENERAL: awake and alert, agitated and restless, orientedx1. HEENT: PERRLA; EOMI. Oropharynx clear. NECK: Supple. No palpable cervical or supraclavicular lymphadenopathy. LUNGS: Decreased air entry bilaterally  CARDIOVASCULAR: Regular rhythm, tachycardic. ABDOMEN: Soft. Non-tender, non-distended. Positive bowel sounds. EXTREMITIES: Without clubbing, cyanosis, or edema. NEUROLOGIC: No focal deficits.    ECOG PS 2     Impression/Plan:      Mr. Mj Bean is a 70-year-old male patient, with a past medical history significant for tobacco use disorder, degenerative joint disease, hypertension, liver disease, polysubstance abuse who had presented with worsening back pain, he was found to have metastatic disease, he has a large heterogeneous left hilar mass measuring about 5.4 cm, with occlusion of the left upper lobe bronchus with severe bronchial narrowing to the left lower lobe, left lung apex lesion measuring 1.5 cm, mediastinal adenopathy, metastasis to the liver, possibly the pancreas (vs primary pancreatic), and the spine,MRI of the thoracic spine had revealed metastatic lesions in the T8 and T12 vertebral bodies, pathologic fracture of the T8 vertebral body with approximately 40% loss of height, epidural extension of tumor along the left lateral aspect of the T8, extending into the left T8-9 neural foramina, moderate central canal stenosis at T8, no cord lesion or cord edema seen, he also has a destructive lesion along the right lateral aspect of the L3 vertebral body, lumbar spine MRI has revealed metastatic lesion in the L3 vertebral body, extending into the right pedicle and superior sacral facets, lateral fracture of L3 with approximately 60% loss of height, metastatic infiltration along the endplates F9-C4, infiltration of tumor into the epidural space at L3 resulting in severe stenosis of the central canal lateral recess on the right neural foramina: Mild right paraspinal mass contiguous with the venous occlusions and extending from the level of L2-L5 S1, the mass exerts mass-effect on the sulcus which is laterally displaced. Small metastatic lesion in the left iliac bone. -Brain MRI had revealed a small extra-axial enhancing mass, measuring up 16mm, this may represent a meningioma, however given her history of malignancy short-term follow-up in 3 months was recommended. Bone scan has revealed widespread metastatic disease to the bones. - The patient was evaluated by neurosurgery, no surgical interventions were recommended. Continue palliative RT to the spine.  - Pain Control.  -Palliative medicine team on board. - Taper decadron.  -The patient is status post liver biopsy, and bronchoscopy/EBUS, pathology from the left endobronchial mass and liver are consistent with non-small cell lung cancer, squamous cell carcinoma with basaloid features. In conjunction with the clinical history of a lung mass, the tumor seen in the liver most likely represents metastasis from a lung primary. High PD-L1 expression at 65%, he is a candidate for treatment with single agent Keytruda after completion of RT. NGS (foundation 1 testing)  Pending. Possible transfer to a facility in [facility] close to his family.  - Hypercalcemia, secondary to bony mets, corrected calcium 12.4, will order Pamidronate. Will continue to follow. Thank you for allowing us to participate in the care of Mr. Chiang.     Sloane Payton MD   HEMATOLOGY/MEDICAL ONCOLOGY  [address]  [address] [place] [address]  Dept: [address]: [phone]

## 2021-01-01 NOTE — PROGRESS NOTES
Hospitalist Progress Note      PCP: No primary care provider on file. Date of Admission: 12/12/2020    Chief Complaint: back pain    Hospital Course:   Patient admitted on the 12th with severe back pain  For the investigations have resulted in a diagnosis of left lung apex lesion with adenopathy with metastasis to the liver and spine. Also evaluated by neurosurgery with no intervention recommended. Palliative treatment to the spine. Palliative team following as well as pain control being attempted. He is already had bronchoscopy with EBUS. 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 Awaiting PD-L1 results to finalize  Radiation started 12/22 for metastatic non-small cell squamous cancer. 12/29: patient found in bed yesterday licking hand covered in stool. PT unable to ambulate patient, he refuses to wear brace despite education. Palliative continues to follow. Last [facility] 10/24 on 12/23, discharge plan is for [facility].   12/30: seen by psych, medications adjusted. Palliative has been in contact with son in regards to hospice. Family has declined hospice and chemo. They would like the him to be sent to a facility close to them in [facility]. 12/31: discharge planning per   1/1/21: labs are improving, vitals stable. Added protein modular to ONS, albumin is low. [facility] yesterday 8/24, worse than previous    Subjective:    Patient lying in bed wrapped in sheet. He is more confused today than yesterday, unable to answer questions appropriately. Bed is wet from urinary incontinence.      Medications:  Reviewed    Infusion Medications   Scheduled Medications    melatonin  3 mg Oral QPM    divalproex  125 mg Oral 2 times per day    methadone  10 mg Oral 3 times per day    morphine  4 mg Intravenous Daily    mineral oil  1 enema Rectal Once    sennosides-docusate sodium  2 tablet Oral BID    furosemide 14.9* 14.1*   HGB 12.7 12.4* 12.4*   HCT 39.7 38.4 39.0    232 209     Recent Labs     12/30/20 0527 12/31/20  0540 01/01/21  0411    144 143   K 5.1* 4.3 3.9   CL 96* 100 101   CO2 37* 33* 33*   BUN 33* 37* 44*   CREATININE 0.9 0.8 0.8   CALCIUM 11.1* 11.0* 11.5*     Recent Labs     12/30/20 0527 12/31/20 0540 01/01/21  0411   AST 44* 40* 32   * 105* 84*   BILITOT 0.6 0.4 0.6   ALKPHOS 77 76 76     No results for input(s): INR in the last 72 hours. No results for input(s): Judy Rusty in the last 72 hours. CT NEEDLE BIOPSY LIVER PERCUTANEOUS   Final Result   Successful uncomplicated CT and fluoroscopic guided liver   mass biopsy. If there are any physician concerns regarding this report, a   Radiologist can be reached by calling the following number [phone]. CT GUIDED NEEDLE PLACEMENT   Final Result   Successful uncomplicated CT and fluoroscopic guided liver   mass biopsy. If there are any physician concerns regarding this report, a   Radiologist can be reached by calling the following number [phone]. NM BONE SCAN WHOLE BODY   Final Result   Findings compatible with degenerative changes    Multiple foci of abnormal tracer uptake, consistent widespread   metastatic disease. MRI BRAIN W WO CONTRAST   Final Result   1. There is a small extra-axial enhancing mass, which appears to span the   right and left aspects of the mid falx. This measures up to 16 mm. No   significant adjacent parenchymal edema is seen. This may represent a   meningioma. However, given history of malignancy, suggest a short-term   follow-up examination in 3 months to evaluate for stability. 2. Otherwise, no acute intracranial abnormality. No acute infarct. 3. Mild global parenchymal volume loss with mild-to-moderate chronic   microvascular ischemic changes. XR FEMUR LEFT (MIN 2 VIEWS)   Final Result   1. Lytic lesion in the left femoral neck.   No additional cm in diameter. Multiple lytic lesions as described involving the T8 vertebral body, T12   vertebral body, L3 vertebral body, and proximal left femur. Pathologic compression fracture of L3 and extensive extension of malignant   process to the paraspinal soft tissues anteriorly and to the right of L3, to   the adjacent intervertebral discs, and spinal canal.  Extension into the   right pedicle and right articular facet and large component extending to the   right iliopsoas muscle which is asymmetrically enlarged. Hypodense process   extending caudally along the course of the right psoas muscle with mild   peripheral enhancement, likely malignant involvement versus associated   abscess, felt to be a less likely consideration. Intraspinal extension of malignant process at the level of T8, and L3.      CT CHEST W CONTRAST   Final Result   Large heterogeneous left hilar mass as described consistent with malignancy. Occlusion of left upper lobe bronchus and severe bronchial narrowing to the   left lower lobe. Mildly spiculated nodular lesion, pleural based in the left lung apex likely   satellite malignant lesion measuring 1.5 cm. Mediastinal adenopathy likely manifesting malignant involvement. Large metastatic lesion to the T8 vertebral body with paraspinal an   intraspinal extension of malignancy and pathologic compression fracture of T8.      CT LUMBAR SPINE WO CONTRAST   Final Result   1. Lytic destructive lesion along the right lateral aspect of the L3   vertebral body, involving the right pedicle, transverse process and superior   articular facet. 2. Pathological fracture on the right with loss of height by approximately   60%. The lesion may represent malignancy or an infectious process. Destructive changes in the discs characteristic of discitis/osteomyelitis are   not evident on this study. Further evaluation with pre and post contrast   enhanced MRI is recommended.    3. Soft tissue fullness extends from the L3 vertebral body into the adjacent   psoas muscle, which is landis than the contralateral side. The soft tissue   component may represent malignancy or infection. 4. Extent of epidural involvement of disease at L3 is not well delineated on   this study. Pre and post contrast enhanced MRI should clarify. 5. Variable degrees of degenerative changes, as described.           Assessment/Plan:    Active Hospital Problems    Diagnosis Date Noted    Lung mass [R91.8] 12/17/2020    Lung cancer metastatic to bone ([facility] [address] [C34.90, C79.51] 12/16/2020    Metastatic cancer to liver [facility]) [C78.7]     Pathologic lumbar vertebral fracture, initial encounter [facility] 12/13/2020    Malignant neoplasm metastatic to bone ([facility]) [C79.51]      Plan  Pain control  ONS with protein modular  Monitor labs  Monitor bowel function  Neurovascular checks  Medications as ordered    DVT Prophylaxis: lovenox  Diet: DIET GENERAL;  Dietary Nutrition Supplements: Standard High Calorie Oral Supplement, Protein Modular  Code Status: DNR-CCA    PT/OT Eval Status: ordered    Dispo - discharge planning      Scott Pandey CNP

## 2021-01-02 PROBLEM — E87.0 HYPERNATREMIA: Status: ACTIVE | Noted: 2021-01-01

## 2021-01-02 NOTE — PROGRESS NOTES
Hematology/Oncology Inpatient f/up:      Reason for Visit:   Metastatic NSCLC. Subjective: The patient is restless, afebrile overnight. Physical Exam:  /62   Pulse 64   Temp 97.5 °F (36.4 °C) (Temporal)   Resp 16   Ht 5' 9\" (1.753 m)   Wt 195 lb (88.5 kg)   SpO2 94%   BMI 28.80 kg/m²   GENERAL: awake and alert, restless. HEENT: PERRLA; EOMI. Oropharynx clear. NECK: Supple. No palpable cervical or supraclavicular lymphadenopathy. LUNGS: Decreased air entry bilaterally  CARDIOVASCULAR: Regular rhythm, tachycardic. ABDOMEN: Soft. Non-tender, non-distended. Positive bowel sounds. EXTREMITIES: Without clubbing, cyanosis, or edema. NEUROLOGIC: No focal deficits.    ECOG PS 2     Impression/Plan:      Mr. Ari Osborne is a 24-year-old male patient, with a past medical history significant for tobacco use disorder, degenerative joint disease, hypertension, liver disease, polysubstance abuse who had presented with worsening back pain, he was found to have metastatic disease, he has a large heterogeneous left hilar mass measuring about 5.4 cm, with occlusion of the left upper lobe bronchus with severe bronchial narrowing to the left lower lobe, left lung apex lesion measuring 1.5 cm, mediastinal adenopathy, metastasis to the liver, possibly the pancreas (vs primary pancreatic), and the spine,MRI of the thoracic spine had revealed metastatic lesions in the T8 and T12 vertebral bodies, pathologic fracture of the T8 vertebral body with approximately 40% loss of height, epidural extension of tumor along the left lateral aspect of the T8, extending into the left T8-9 neural foramina, moderate central canal stenosis at T8, no cord lesion or cord edema seen, he also has a destructive lesion along the right lateral aspect of the L3 vertebral body, lumbar spine MRI has revealed metastatic lesion in the L3 vertebral body, extending into the right pedicle and superior sacral facets, lateral fracture of L3 with approximately 60% loss of height, metastatic infiltration along the endplates K6-L3, infiltration of tumor into the epidural space at L3 resulting in severe stenosis of the central canal lateral recess on the right neural foramina: Mild right paraspinal mass contiguous with the venous occlusions and extending from the level of L2-L5 S1, the mass exerts mass-effect on the sulcus which is laterally displaced. Small metastatic lesion in the left iliac bone. -Brain MRI had revealed a small extra-axial enhancing mass, measuring up 16mm, this may represent a meningioma, however given her history of malignancy short-term follow-up in 3 months was recommended. Bone scan has revealed widespread metastatic disease to the bones. - The patient was evaluated by neurosurgery, no surgical interventions were recommended. Continue palliative RT to the spine.  - Pain Control.  -Palliative medicine team on board. - Decadron is being tapered.  -The patient is status post liver biopsy, and bronchoscopy/EBUS, pathology from the left endobronchial mass and liver are consistent with non-small cell lung cancer, squamous cell carcinoma with basaloid features. In conjunction with the clinical history of a lung mass, the tumor seen in the liver most likely represents metastasis from a lung primary. High PD-L1 expression at 65%, he is a candidate for treatment with single agent Keytruda after completion of RT. NGS (foundation 1 testing)  Pending. Possible transfer to a facility in [facility] close to his family.  - Hypercalcemia, secondary to bony mets, Pamidronate has been ordered, awaiting IV access to administer the Pamidronate and IVF. Will continue to follow. Thank you for allowing us to participate in the care of Mr. Chiang.     Miko Willis MD   HEMATOLOGY/MEDICAL ONCOLOGY  [facility]  [address] [address]  Dept: [phone]  Loc: [phone]

## 2021-01-02 NOTE — PROGRESS NOTES
Hospitalist Progress Note      PCP: No primary care provider on file. Date of Admission: 12/12/2020    Chief Complaint: back pain    Hospital Course:   Patient admitted on the 12th with severe back pain  For the investigations have resulted in a diagnosis of left lung apex lesion with adenopathy with metastasis to the liver and spine. Also evaluated by neurosurgery with no intervention recommended. Palliative treatment to the spine. Palliative team following as well as pain control being attempted. He is already had bronchoscopy with EBUS. 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 Awaiting PD-L1 results to finalize  Radiation started 12/22 for metastatic non-small cell squamous cancer. 12/29: patient found in bed yesterday licking hand covered in stool. PT unable to ambulate patient, he refuses to wear brace despite education. Palliative continues to follow. Last [facility] 10/24 on 12/23, discharge plan is for [facility].   12/30: seen by psych, medications adjusted. Palliative has been in contact with son in regards to hospice. Family has declined hospice and chemo. They would like the him to be sent to a facility close to them in [place]. 12/31: discharge planning per   1/1/21: labs are improving, vitals stable. Added protein modular to ONS, albumin is low. [facility] yesterday 8/24, worse than previous  1/2: found to have hypernatremia at 153, IV 0.45 NS ordered. IV team called for IV start. Spoke with the son Carleen Venegas (Manpreet Boateng), he does not want his father to have chemo at this point and is considering transferring into hospice. Subjective:    Patient lying in bed. He is more confused today.     Medications:  Reviewed    Infusion Medications    sodium chloride       Scheduled Medications    dexamethasone  4 mg Oral 2 times per day    pamidronate (AREDIA) IVPB  90 mg Intravenous Once    melatonin  3 mg Oral QPM    divalproex  125 mg Oral 2 times per day    methadone  10 mg Oral 3 times per day    morphine  4 mg Intravenous Daily    mineral oil  1 enema Rectal Once    sennosides-docusate sodium  2 tablet Oral BID    furosemide  20 mg Oral Daily    hydrALAZINE  25 mg Oral 3 times per day    pantoprazole  40 mg Oral QAM AC    potassium bicarb-citric acid  20 mEq Oral BID    potassium chloride  40 mEq Oral Once    lidocaine PF  5 mL Nebulization Once    sodium chloride flush  10 mL Intravenous 2 times per day    gabapentin  400 mg Oral TID    carvedilol  25 mg Oral BID WC    folic acid  1 mg Oral Daily    mirtazapine  15 mg Oral Nightly    enoxaparin  40 mg Subcutaneous Daily     PRN Meds: polyethylene glycol, oxyCODONE **OR** oxyCODONE, sodium chloride flush, LORazepam, LORazepam, gadobenate dimeglumine, bisacodyl, acetaminophen, sodium chloride flush      Intake/Output Summary (Last 24 hours) at 1/2/2021 0949  Last data filed at 1/1/2021 2311  Gross per 24 hour   Intake 60 ml   Output --   Net 60 ml       Exam:    /62   Pulse 64   Temp 97.2 °F (36.2 °C) (Temporal)   Resp 16   Ht 5' 9\" (1.753 m)   Wt 195 lb (88.5 kg)   SpO2 97%   BMI 28.80 kg/m²     General appearance: No apparent distress, appears stated age and cooperative. HEENT: Pupils equal, round, and reactive to light. Conjunctivae/corneas clear. Neck: Supple, with full range of motion. No jugular venous distention. Trachea midline. Respiratory:  Normal respiratory effort. Clear to auscultation, bilaterally without Rales/Wheezes/Rhonchi. Cardiovascular: Regular rate and rhythm with normal S1/S2 without murmurs, rubs or gallops. Abdomen: Soft, non-tender, non-distended with normal bowel sounds. Musculoskeletal: No clubbing, cyanosis or edema bilaterally. Full range of motion without deformity.   Skin: Skin color, texture, turgor normal.   Neurologic:  Neurovascularly intact without any focal sensory/motor deficits. Psychiatric: Alert   Capillary Refill: Brisk,< 3 seconds   Peripheral Pulses: +2 palpable, equal bilaterally       Labs:   Recent Labs     12/31/20  0540 01/01/21  0411   WBC 14.9* 14.1*   HGB 12.4* 12.4*   HCT 38.4 39.0    209     Recent Labs     12/31/20  0540 01/01/21  0411 01/02/21  0825    143 153*   K 4.3 3.9 4.0    101 109*   CO2 33* 33* 35*   BUN 37* 44* 47*   CREATININE 0.8 0.8 0.9   CALCIUM 11.0* 11.5* 11.4*     Recent Labs     12/31/20  0540 01/01/21  0411 01/02/21  0825   AST 40* 32 30   * 84* 66*   BILITOT 0.4 0.6 0.7   ALKPHOS 76 76 79     No results for input(s): INR in the last 72 hours. No results for input(s): Towana Megan in the last 72 hours. CT NEEDLE BIOPSY LIVER PERCUTANEOUS   Final Result   Successful uncomplicated CT and fluoroscopic guided liver   mass biopsy. If there are any physician concerns regarding this report, a   Radiologist can be reached by calling the following number [phone]. CT GUIDED NEEDLE PLACEMENT   Final Result   Successful uncomplicated CT and fluoroscopic guided liver   mass biopsy. If there are any physician concerns regarding this report, a   Radiologist can be reached by calling the following number [phone]. NM BONE SCAN WHOLE BODY   Final Result   Findings compatible with degenerative changes    Multiple foci of abnormal tracer uptake, consistent widespread   metastatic disease. MRI BRAIN W WO CONTRAST   Final Result   1. There is a small extra-axial enhancing mass, which appears to span the   right and left aspects of the mid falx. This measures up to 16 mm. No   significant adjacent parenchymal edema is seen. This may represent a   meningioma. However, given history of malignancy, suggest a short-term   follow-up examination in 3 months to evaluate for stability. 2. Otherwise, no acute intracranial abnormality. No acute infarct.    3. Mild global parenchymal volume loss with disease. Mildly heterogeneous mass extending cephalad from the head of the pancreas,   most suspicious for pancreatic malignancy measuring approximately 2.4 x 3 x   3.2 cm in diameter. Multiple lytic lesions as described involving the T8 vertebral body, T12   vertebral body, L3 vertebral body, and proximal left femur. Pathologic compression fracture of L3 and extensive extension of malignant   process to the paraspinal soft tissues anteriorly and to the right of L3, to   the adjacent intervertebral discs, and spinal canal.  Extension into the   right pedicle and right articular facet and large component extending to the   right iliopsoas muscle which is asymmetrically enlarged. Hypodense process   extending caudally along the course of the right psoas muscle with mild   peripheral enhancement, likely malignant involvement versus associated   abscess, felt to be a less likely consideration. Intraspinal extension of malignant process at the level of T8, and L3.      CT CHEST W CONTRAST   Final Result   Large heterogeneous left hilar mass as described consistent with malignancy. Occlusion of left upper lobe bronchus and severe bronchial narrowing to the   left lower lobe. Mildly spiculated nodular lesion, pleural based in the left lung apex likely   satellite malignant lesion measuring 1.5 cm. Mediastinal adenopathy likely manifesting malignant involvement. Large metastatic lesion to the T8 vertebral body with paraspinal an   intraspinal extension of malignancy and pathologic compression fracture of T8.      CT LUMBAR SPINE WO CONTRAST   Final Result   1. Lytic destructive lesion along the right lateral aspect of the L3   vertebral body, involving the right pedicle, transverse process and superior   articular facet. 2. Pathological fracture on the right with loss of height by approximately   60%. The lesion may represent malignancy or an infectious process.    Destructive changes in the discs

## 2021-01-02 NOTE — PLAN OF CARE
Problem: Pain:  Goal: Control of acute pain  Description: Control of acute pain  Outcome: Met This Shift     Problem: Falls - Risk of:  Goal: Will remain free from falls  Description: Will remain free from falls  Outcome: Met This Shift     Problem: Falls - Risk of:  Goal: Absence of physical injury  Description: Absence of physical injury  Outcome: Met This Shift     Problem: Injury - Risk of, Physical Injury:  Goal: Will remain free from falls  Description: Will remain free from falls  Outcome: Met This Shift     Problem: Injury - Risk of, Physical Injury:  Goal: Absence of physical injury  Description: Absence of physical injury  Outcome: Met This Shift

## 2021-01-03 NOTE — PLAN OF CARE
Problem: Falls - Risk of:  Goal: Will remain free from falls  Description: Will remain free from falls  Outcome: Met This Shift  Goal: Absence of physical injury  Description: Absence of physical injury  Outcome: Met This Shift     Problem: Safety:  Goal: Free from accidental physical injury  Description: Free from accidental physical injury  Outcome: Met This Shift  Goal: Free from intentional harm  Description: Free from intentional harm  Outcome: Met This Shift     Problem: Injury - Risk of, Physical Injury:  Goal: Will remain free from falls  Description: Will remain free from falls  Outcome: Met This Shift  Goal: Absence of physical injury  Description: Absence of physical injury  Outcome: Met This Shift     Problem: Mood - Altered:  Goal: Mood stable  Description: Mood stable  Outcome: Met This Shift  Goal: Absence of abusive behavior  Description: Absence of abusive behavior  Outcome: Met This Shift  Goal: Verbalizations of feeling emotionally comfortable while being cared for will increase  Description: Verbalizations of feeling emotionally comfortable while being cared for will increase  Outcome: Met This Shift     Problem: Pain:  Goal: Pain level will decrease  Description: Pain level will decrease  Outcome: Ongoing  Goal: Control of acute pain  Description: Control of acute pain  Outcome: Ongoing     Problem: Daily Care:  Goal: Daily care needs are met  Description: Daily care needs are met  Outcome: Ongoing     Problem: Skin Integrity:  Goal: Skin integrity will stabilize  Description: Skin integrity will stabilize  Outcome: Ongoing     Problem: Skin Integrity:  Goal: Absence of new skin breakdown  Description: Absence of new skin breakdown  Outcome: Ongoing     Problem: Confusion - Acute:  Goal: Absence of continued neurological deterioration signs and symptoms  Description: Absence of continued neurological deterioration signs and symptoms  Outcome: Ongoing  Goal: Mental status will be restored to baseline  Description: Mental status will be restored to baseline  Outcome: Ongoing     Problem: Psychomotor Activity - Altered:  Goal: Absence of psychomotor disturbance signs and symptoms  Description: Absence of psychomotor disturbance signs and symptoms  Outcome: Ongoing     Problem: Pain:  Goal: Pain level will decrease  Description: Pain level will decrease  Outcome: Ongoing  Goal: Control of acute pain  Description: Control of acute pain  Outcome: Ongoing     Problem: Daily Care:  Goal: Daily care needs are met  Description: Daily care needs are met  Outcome: Ongoing     Problem: Skin Integrity:  Goal: Skin integrity will stabilize  Description: Skin integrity will stabilize  Outcome: Ongoing     Problem: Skin Integrity:  Goal: Absence of new skin breakdown  Description: Absence of new skin breakdown  Outcome: Ongoing     Problem: Confusion - Acute:  Goal: Absence of continued neurological deterioration signs and symptoms  Description: Absence of continued neurological deterioration signs and symptoms  Outcome: Ongoing  Goal: Mental status will be restored to baseline  Description: Mental status will be restored to baseline  Outcome: Ongoing     Problem: Psychomotor Activity - Altered:  Goal: Absence of psychomotor disturbance signs and symptoms  Description: Absence of psychomotor disturbance signs and symptoms  Outcome: Ongoing

## 2021-01-03 NOTE — PROGRESS NOTES
Hospitalist Progress Note      PCP: No primary care provider on file. Date of Admission: 12/12/2020    Chief Complaint: back pain    Hospital Course:   Patient admitted on the 12th with severe back pain  For the investigations have resulted in a diagnosis of left lung apex lesion with adenopathy with metastasis to the liver and spine. Also evaluated by neurosurgery with no intervention recommended. Palliative treatment to the spine. Palliative team following as well as pain control being attempted. He is already had bronchoscopy with EBUS. 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 Awaiting PD-L1 results to finalize  Radiation started 12/22 for metastatic non-small cell squamous cancer. 12/29: patient found in bed yesterday licking hand covered in stool. PT unable to ambulate patient, he refuses to wear brace despite education. Palliative continues to follow. Last [facility] 10/24 on 12/23, discharge plan is for [facility].   12/30: seen by psych, medications adjusted. Palliative has been in contact with son in regards to hospice. Family has declined hospice and chemo. They would like the him to be sent to a facility close to them in [facility]. 12/31: discharge planning per   1/1/21: labs are improving, vitals stable. Added protein modular to ONS, albumin is low. [facility] yesterday 8/24, worse than previous  1/2: found to have hypernatremia at 153, IV 0.45 NS ordered. IV team called for IV start. Spoke with the son Julia Ibarra ([facility]), he does not want his father to have chemo at this point and is considering transferring into hospice. 1/3: family has decided to transition the patient into hospice care. Na remains elevated, IVFs adjusted. Subjective:    Patient lying in bed. He is not responding to verbal stimuli today.      Medications:  Reviewed    Infusion Medications    dextrose 5% and 0.45% NaCl with KCl 20 mEq       Scheduled Medications    dexamethasone  4 mg Oral 2 times per day    pamidronate (AREDIA) IVPB  90 mg Intravenous Once    melatonin  3 mg Oral QPM    divalproex  125 mg Oral 2 times per day    methadone  10 mg Oral 3 times per day    morphine  4 mg Intravenous Daily    sodium chloride flush  10 mL Intravenous 2 times per day    gabapentin  400 mg Oral TID    mirtazapine  15 mg Oral Nightly     PRN Meds: polyethylene glycol, oxyCODONE **OR** oxyCODONE, sodium chloride flush, LORazepam, LORazepam, gadobenate dimeglumine, bisacodyl, acetaminophen, sodium chloride flush      Intake/Output Summary (Last 24 hours) at 1/3/2021 1022  Last data filed at 1/2/2021 1925  Gross per 24 hour   Intake 0 ml   Output --   Net 0 ml       Exam:    BP (!) 118/56   Pulse 64   Temp 97.6 °F (36.4 °C) (Temporal)   Resp 24   Ht 5' 9\" (1.753 m)   Wt 195 lb (88.5 kg)   SpO2 (!) 84%   BMI 28.80 kg/m²     General appearance: No apparent distress, appears stated age and cooperative. HEENT: Pupils equal, round, and reactive to light. Conjunctivae/corneas clear. Neck: Supple, with full range of motion. No jugular venous distention. Trachea midline. Respiratory:  Normal respiratory effort. Clear to auscultation, bilaterally without Rales/Wheezes/Rhonchi. Cardiovascular: Regular rate and rhythm with normal S1/S2 without murmurs, rubs or gallops. Abdomen: Soft, non-tender, non-distended with normal bowel sounds. Musculoskeletal: No clubbing, cyanosis or edema bilaterally. Full range of motion without deformity.   Skin: Skin color, texture, turgor normal.   Neurologic:  Neurovascularly intact   Psychiatric: Awake  Capillary Refill: Brisk,< 3 seconds   Peripheral Pulses: +2 palpable, equal bilaterally       Labs:   Recent Labs     01/01/21 0411 01/02/21 0825 01/03/21 0414   WBC 14.1* 16.8* 11.8*   HGB 12.4* 13.2 12.5   HCT 39.0 43.1 41.1    217 167     Recent Labs     01/01/21 0411 01/02/21 0825 01/03/21 0414    153* 155*   K 3.9 4.0 3.8    109* 111*   CO2 33* 35* 34*   BUN 44* 47* 55*   CREATININE 0.8 0.9 1.0   CALCIUM 11.5* 11.4* 11.9*     Recent Labs     01/01/21  0411 01/02/21  0825 01/03/21 0414   AST 32 30 31   ALT 84* 66* 60*   BILITOT 0.6 0.7 0.7   ALKPHOS 76 79 73     No results for input(s): INR in the last 72 hours. No results for input(s): Kathyanne Fennel in the last 72 hours. CT NEEDLE BIOPSY LIVER PERCUTANEOUS   Final Result   Successful uncomplicated CT and fluoroscopic guided liver   mass biopsy. If there are any physician concerns regarding this report, a   Radiologist can be reached by calling the following number [phone]. CT GUIDED NEEDLE PLACEMENT   Final Result   Successful uncomplicated CT and fluoroscopic guided liver   mass biopsy. If there are any physician concerns regarding this report, a   Radiologist can be reached by calling the following number [phone]. NM BONE SCAN WHOLE BODY   Final Result   Findings compatible with degenerative changes    Multiple foci of abnormal tracer uptake, consistent widespread   metastatic disease. MRI BRAIN W WO CONTRAST   Final Result   1. There is a small extra-axial enhancing mass, which appears to span the   right and left aspects of the mid falx. This measures up to 16 mm. No   significant adjacent parenchymal edema is seen. This may represent a   meningioma. However, given history of malignancy, suggest a short-term   follow-up examination in 3 months to evaluate for stability. 2. Otherwise, no acute intracranial abnormality. No acute infarct. 3. Mild global parenchymal volume loss with mild-to-moderate chronic   microvascular ischemic changes. XR FEMUR LEFT (MIN 2 VIEWS)   Final Result   1. Lytic lesion in the left femoral neck. No additional evidence of osseous   metastasis on this exam.  No evidence of pathologic fracture. 2.  Mild left hip joint osteoarthritis. MRI LUMBAR SPINE W WO CONTRAST   Final Result   1. Metastatic lesion in the L3 vertebral body, extending into the right   pedicle and superior articular facet. Pathological fracture of L3, with   approximately 60% loss of height. 2. Metastatic infiltration along the endplates L3 and L4. No pathological   fracture seen at these levels. 3. Infiltration of tumor into the epidural space at L3 resulting in [place], LATERAL RECESSES AND THE RIGHT NEURAL FORAMINA. 4. Large right paraspinal mass is contiguous with the bony metastatic lesions   and extends from the level of L2 to L5-S1. The mass exerts mass effect on   the psoas, which is laterally displaced. Areas of cystic degeneration are   seen right paraspinal mass at the level of L4 and L5.   5. Partially included in the field of view of this study is a small   metastatic lesion in the left iliac bone      MRI THORACIC SPINE W WO CONTRAST   Final Result   1. Metastatic lesions in the T8 and T12 vertebral bodies, more prominent in   the former. 2. Pathological fracture of the T8 vertebral body with approximately 40% loss   of height. 3. Epidural extension of tumor along the left lateral aspect at T8, extending   into the left T8-9 neural foramina. 4. Moderate central canal stenosis at T8.   5. No cord lesion or cord edema seen. XR EYE FOREIGN BODY   Final Result   No evidence of metallic foreign body within the orbits. CT ABDOMEN PELVIS W IV CONTRAST Additional Contrast? None   Final Result   Hypodense lesions in the right lobe of the liver, the larger measuring 3.5 cm   in diameter and was consistent with metastatic disease. Mildly heterogeneous mass extending cephalad from the head of the pancreas,   most suspicious for pancreatic malignancy measuring approximately 2.4 x 3 x   3.2 cm in diameter.    Multiple lytic lesions as described involving the T8 vertebral body, T12   vertebral body, L3 vertebral body, tissue   component may represent malignancy or infection. 4. Extent of epidural involvement of disease at L3 is not well delineated on   this study. Pre and post contrast enhanced MRI should clarify. 5. Variable degrees of degenerative changes, as described.           Assessment/Plan:    Active Hospital Problems    Diagnosis Date Noted    Hypernatremia [E87.0] 01/02/2021    Lung mass [R91.8] 12/17/2020    Lung cancer metastatic to bone ([facility] [address] [C34.90, C79.51] 12/16/2020    Metastatic cancer to liver [facility]) [C78.7]     Pathologic lumbar vertebral fracture, initial encounter [facility] 12/13/2020    Malignant neoplasm metastatic to bone ([facility]) [C79.51]      Plan  Pain control  ONS with protein modular  Monitor labs  Monitor bowel function  Neurovascular checks  Medications as ordered  IVFs    DVT Prophylaxis: lovenox  Diet: DIET GENERAL;  Dietary Nutrition Supplements: Standard High Calorie Oral Supplement, Protein Modular  Code Status: DNR-CC    PT/OT Eval Status: ordered    Dispo - discharge planning, hospice      Osiris Bhatt CNP

## 2021-01-03 NOTE — PROGRESS NOTES
Hematology/Oncology Inpatient f/up:      Reason for Visit:   Metastatic NSCLC. Subjective: The patient is lethargic, opens his eyes but is non verbal.    Physical Exam:  BP (!) 118/56   Pulse 64   Temp 97.6 °F (36.4 °C) (Temporal)   Resp 24   Ht 5' 9\" (1.753 m)   Wt 195 lb (88.5 kg)   SpO2 (!) 84%   BMI 28.80 kg/m²   GENERAL: Lethargic, non verbal.  HEENT: PERRLA; EOMI. Oropharynx clear. NECK: Supple. No palpable cervical or supraclavicular lymphadenopathy. LUNGS: Decreased air entry bilaterally  CARDIOVASCULAR: Regular rhythm, tachycardic. ABDOMEN: Soft. Non-tender, non-distended. Positive bowel sounds. EXTREMITIES: Without clubbing, cyanosis, or edema. NEUROLOGIC: moves his upper and lower extremities.    ECOG PS 3    Impression/Plan:      Mr. Rubi Vincent is a 47-year-old male patient, with a past medical history significant for tobacco use disorder, degenerative joint disease, hypertension, liver disease, polysubstance abuse who had presented with worsening back pain, he was found to have metastatic disease, he has a large heterogeneous left hilar mass measuring about 5.4 cm, with occlusion of the left upper lobe bronchus with severe bronchial narrowing to the left lower lobe, left lung apex lesion measuring 1.5 cm, mediastinal adenopathy, metastasis to the liver, possibly the pancreas (vs primary pancreatic), and the spine,MRI of the thoracic spine had revealed metastatic lesions in the T8 and T12 vertebral bodies, pathologic fracture of the T8 vertebral body with approximately 40% loss of height, epidural extension of tumor along the left lateral aspect of the T8, extending into the left T8-9 neural foramina, moderate central canal stenosis at T8, no cord lesion or cord edema seen, he also has a destructive lesion along the right lateral aspect of the L3 vertebral body, lumbar spine MRI has revealed metastatic lesion in the L3 vertebral body, extending into the right pedicle and superior sacral [address]

## 2021-01-03 NOTE — PROGRESS NOTES
Sent MD fatima notifying them that the patient has only voided once this shift.     1/3/21 2:40 PM   Just sending a FYI, patient has only voided one time this shift  Read 2:40 PM

## 2021-01-03 NOTE — PROGRESS NOTES
Palliative Medicine  Social Work Progress Note    Pt Name: Frank Medina for Palliative Medicine contacted [facility] and spoke with Dandre Arnold to follow up on referral made yesterday. Family had requested [facility] in [facility], as it will be close to where they live in [place]. Pt unable to receive care in [place] as he has [place]. Dandre Arnold advised she will follow up with their nurse liaison, Timothy Feng, who will follow up with family and with  tomorrow to coordinate discharge plans.

## 2021-01-03 NOTE — PROGRESS NOTES
Patient is resting in bed quietly, not getting vitals at this time. Promoting rest for patient will continue to monitor.

## 2021-01-04 NOTE — CARE COORDINATION
1/4/2021 social work transition of care planning  Sw followed up with [facility] hospice, they declined referral. Sw will follow up with family for hospice choice.   Electronically signed by CAROLINA Calles on 1/4/2021 at 11:44 AM

## 2021-01-04 NOTE — PROGRESS NOTES
Hospitalist Progress Note      PCP: No primary care provider on file. Date of Admission: 12/12/2020    Chief Complaint: back pain    Hospital Course:   Patient admitted on the 12th with severe back pain  For the investigations have resulted in a diagnosis of left lung apex lesion with adenopathy with metastasis to the liver and spine. Also evaluated by neurosurgery with no intervention recommended. Palliative treatment to the spine. Palliative team following as well as pain control being attempted. He is already had bronchoscopy with EBUS. 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 Awaiting PD-L1 results to finalize  Radiation started 12/22 for metastatic non-small cell squamous cancer. 12/29: patient found in bed yesterday licking hand covered in stool. PT unable to ambulate patient, he refuses to wear brace despite education. Palliative continues to follow. Last [facility] 10/24 on 12/23, discharge plan is for [facility].   12/30: seen by psych, medications adjusted. Palliative has been in contact with son in regards to hospice. Family has declined hospice and chemo. They would like the him to be sent to a facility close to them in [place]. 12/31: discharge planning per   1/1/21: labs are improving, vitals stable. Added protein modular to ONS, albumin is low. [facility] yesterday 8/24, worse than previous  1/2: found to have hypernatremia at 153, IV 0.45 NS ordered. IV team called for IV start. Spoke with the son Evangelina ([facility]), he does not want his father to have chemo at this point and is considering transferring into hospice. 1/3: family has decided to transition the patient into hospice care. Na remains elevated, IVFs adjusted. 1/4: Na remains elevated, slightly improved. [facility] contacted for referral, looking for facility near [place].  I placed call to daughter to inform her of patient's condition, she and her mother will be making the trip here today; they should be here this evening. Subjective:    Patient lying in bed, having agonal breathing. Medications:  Reviewed    Infusion Medications    dextrose 5% and 0.45% NaCl with KCl 20 mEq 100 mL/hr at 01/03/21 2221     Scheduled Medications    dexamethasone  4 mg Oral 2 times per day    pamidronate (AREDIA) IVPB  90 mg Intravenous Once    melatonin  3 mg Oral QPM    divalproex  125 mg Oral 2 times per day    methadone  10 mg Oral 3 times per day    morphine  4 mg Intravenous Daily    sodium chloride flush  10 mL Intravenous 2 times per day    gabapentin  400 mg Oral TID    mirtazapine  15 mg Oral Nightly     PRN Meds: polyethylene glycol, oxyCODONE **OR** oxyCODONE, sodium chloride flush, LORazepam, LORazepam, gadobenate dimeglumine, bisacodyl, acetaminophen, sodium chloride flush      Intake/Output Summary (Last 24 hours) at 1/4/2021 0852  Last data filed at 1/3/2021 2149  Gross per 24 hour   Intake 60 ml   Output --   Net 60 ml       Exam:    BP (!) 106/58   Pulse 103   Temp 97.8 °F (36.6 °C) (Temporal)   Resp 22   Ht 5' 9\" (1.753 m)   Wt 195 lb (88.5 kg)   SpO2 92%   BMI 28.80 kg/m²     General appearance: appears stated age and cooperative. HEENT: Pupils equal, round, and reactive to light. Conjunctivae/corneas clear. Neck: Supple, with full range of motion. No jugular venous distention. Trachea midline. Respiratory: agonal respirations. Clear to auscultation  Cardiovascular: Regular rate and rhythm with normal S1/S2 without murmurs, rubs or gallops. Abdomen: Soft, non-tender, non-distended with normal bowel sounds. Musculoskeletal: No clubbing, cyanosis or edema bilaterally. Full range of motion without deformity.   Skin: Skin color, texture, turgor normal.   Psychiatric: Awake  Capillary Refill: Brisk,< 3 seconds   Peripheral Pulses: +2 palpable, equal bilaterally       Labs:   Recent Labs 01/02/21 0825 01/03/21 0414 01/04/21  0538   WBC 16.8* 11.8* 10.7   HGB 13.2 12.5 12.1*   HCT 43.1 41.1 40.2    167 127*     Recent Labs     01/02/21 0825 01/03/21  0414 01/04/21  0538   * 155* 154*   K 4.0 3.8 4.1   * 111* 117*   CO2 35* 34* 31*   BUN 47* 55* 49*   CREATININE 0.9 1.0 0.9   CALCIUM 11.4* 11.9* 11.4*     Recent Labs     01/02/21 0825 01/03/21  0414 01/04/21  0538   AST 30 31 67*   ALT 66* 60* 56*   BILITOT 0.7 0.7 0.8   ALKPHOS 79 73 66     No results for input(s): INR in the last 72 hours. No results for input(s): Mandeep Martinez in the last 72 hours. CT NEEDLE BIOPSY LIVER PERCUTANEOUS   Final Result   Successful uncomplicated CT and fluoroscopic guided liver   mass biopsy. If there are any physician concerns regarding this report, a   Radiologist can be reached by calling the following number [phone]. CT GUIDED NEEDLE PLACEMENT   Final Result   Successful uncomplicated CT and fluoroscopic guided liver   mass biopsy. If there are any physician concerns regarding this report, a   Radiologist can be reached by calling the following number [phone]. NM BONE SCAN WHOLE BODY   Final Result   Findings compatible with degenerative changes    Multiple foci of abnormal tracer uptake, consistent widespread   metastatic disease. MRI BRAIN W WO CONTRAST   Final Result   1. There is a small extra-axial enhancing mass, which appears to span the   right and left aspects of the mid falx. This measures up to 16 mm. No   significant adjacent parenchymal edema is seen. This may represent a   meningioma. However, given history of malignancy, suggest a short-term   follow-up examination in 3 months to evaluate for stability. 2. Otherwise, no acute intracranial abnormality. No acute infarct. 3. Mild global parenchymal volume loss with mild-to-moderate chronic   microvascular ischemic changes. XR FEMUR LEFT (MIN 2 VIEWS)   Final Result   1. Lytic lesion in the left femoral neck. No additional evidence of osseous   metastasis on this exam.  No evidence of pathologic fracture. 2.  Mild left hip joint osteoarthritis. MRI LUMBAR SPINE W WO CONTRAST   Final Result   1. Metastatic lesion in the L3 vertebral body, extending into the right   pedicle and superior articular facet. Pathological fracture of L3, with   approximately 60% loss of height. 2. Metastatic infiltration along the endplates L3 and L4. No pathological   fracture seen at these levels. 3. Infiltration of tumor into the epidural space at L3 resulting in [place], LATERAL RECESSES AND THE RIGHT NEURAL FORAMINA. 4. Large right paraspinal mass is contiguous with the bony metastatic lesions   and extends from the level of L2 to L5-S1. The mass exerts mass effect on   the psoas, which is laterally displaced. Areas of cystic degeneration are   seen right paraspinal mass at the level of L4 and L5.   5. Partially included in the field of view of this study is a small   metastatic lesion in the left iliac bone      MRI THORACIC SPINE W WO CONTRAST   Final Result   1. Metastatic lesions in the T8 and T12 vertebral bodies, more prominent in   the former. 2. Pathological fracture of the T8 vertebral body with approximately 40% loss   of height. 3. Epidural extension of tumor along the left lateral aspect at T8, extending   into the left T8-9 neural foramina. 4. Moderate central canal stenosis at T8.   5. No cord lesion or cord edema seen. XR EYE FOREIGN BODY   Final Result   No evidence of metallic foreign body within the orbits. CT ABDOMEN PELVIS W IV CONTRAST Additional Contrast? None   Final Result   Hypodense lesions in the right lobe of the liver, the larger measuring 3.5 cm   in diameter and was consistent with metastatic disease.    Mildly heterogeneous mass extending cephalad from the head of the pancreas,   most suspicious for pancreatic malignancy measuring approximately 2.4 x 3 x   3.2 cm in diameter. Multiple lytic lesions as described involving the T8 vertebral body, T12   vertebral body, L3 vertebral body, and proximal left femur. Pathologic compression fracture of L3 and extensive extension of malignant   process to the paraspinal soft tissues anteriorly and to the right of L3, to   the adjacent intervertebral discs, and spinal canal.  Extension into the   right pedicle and right articular facet and large component extending to the   right iliopsoas muscle which is asymmetrically enlarged. Hypodense process   extending caudally along the course of the right psoas muscle with mild   peripheral enhancement, likely malignant involvement versus associated   abscess, felt to be a less likely consideration. Intraspinal extension of malignant process at the level of T8, and L3.      CT CHEST W CONTRAST   Final Result   Large heterogeneous left hilar mass as described consistent with malignancy. Occlusion of left upper lobe bronchus and severe bronchial narrowing to the   left lower lobe. Mildly spiculated nodular lesion, pleural based in the left lung apex likely   satellite malignant lesion measuring 1.5 cm. Mediastinal adenopathy likely manifesting malignant involvement. Large metastatic lesion to the T8 vertebral body with paraspinal an   intraspinal extension of malignancy and pathologic compression fracture of T8.      CT LUMBAR SPINE WO CONTRAST   Final Result   1. Lytic destructive lesion along the right lateral aspect of the L3   vertebral body, involving the right pedicle, transverse process and superior   articular facet. 2. Pathological fracture on the right with loss of height by approximately   60%. The lesion may represent malignancy or an infectious process. Destructive changes in the discs characteristic of discitis/osteomyelitis are   not evident on this study.   Further evaluation with pre and post

## 2021-01-05 NOTE — CARE COORDINATION
1/5/2021 social work transition of care planning  Sw spoke with pt's dtr,Isabella. Family is agreeable to hospice, choiced for [facility]-referral made. Electronically signed by CAROLINA Youngblood on 1/5/2021 at 11:04 AM     Addendum: Shanda spoke with Christina melchor [facility], pt will be transferred to the hospice house.   Electronically signed by CAROLINA Youngblood on 1/5/2021 at 3:25 PM

## 2021-01-05 NOTE — PROGRESS NOTES
Palliative Care Department  [phone]  Palliative Care Progress Note  Provider Viola Valenzuela PA-C    Kuldeep Aguayo  [id number]  Hospital Day: 25    Referring Provider: Herlinda Dowd MD  Palliative Medicine was consulted for assistance with: Symptom management, goals of care, CODE STATUS and family support    Brief summary:  Kuldeep Aguayo is a 44 y.o. who presented to the hospital with chief complaints of back pain and was admitted 12/12/2020 with diagnosis(ses) of malignant neoplasm metastatic to bone, lung mass, pathologic fracture of vertebrae due to neoplastic disease. Patient has started workup for malignancy. ASSESSMENT/PLAN:   Pertinent hospital diagnoses:  1. Metastatic non-small cell lung cancer, squamous type   -Liver and lung biopsy positive for malignancy, pathology and cytology reviewed  -Radiation oncology     2. Pain associated with neoplasm  - oxycodone 15-20mg q4 hrs prn  - methadone 10mg q8 hours, crush and give with liquid or pudding  - increased neurontin to 400mg TID  - morphine 4-5 mg IV every 2 hours as needed     3. GI-constipation resolved  - senna-s 2 tabs bid  - miralax 17 gm daily PRN     4.  Spinal cord stenosis secondary to tumor infiltration  -Receiving steroids    5. Confusion  - hypercalcemia, corrected calcium over 13  - aredia ordered but never given    PALLIATIVE CARE ENCOUNTER  - Capacity: At this time, Kuldeep Aguayo, Does have capacity for medical decision-making.   Capacity is time limited and situation/question specific  - Outcome of goals of care meeting: adjusted pain medication  - Code Status:   full  - Advanced directives: None  - Surrogate decision maker/Legal NOK:     Contacts:    Reyes Ace (mother) [phone]   [facility] [phone]  - Spiritual assessment: none  - Bereavement and grief: diagnosis and young age    - DISPO: [place] hosp[place] [address]  Referrals to: none    Subjective   Chart reviewed  Discussed with RN    Patient awake but confused   He does admit to pain, he is not able to have an extended conversation but he does follow commands minimally    Inpatient medications reviewed: yes  Home Medications reviewed: yes    OBJECTIVE:   Prognosis: Poor    Physical Exam:  BP (!) 138/100   Pulse 79   Temp 99 °F (37.2 °C) (Temporal)   Resp 20   Ht 5' 9\" (1.753 m)   Wt 195 lb (88.5 kg)   SpO2 97%   BMI 28.80 kg/m²   Gen:  worsening appearance with visual weight loss, moving in the bed and appears distressed  HEENT:  temporal wasting, mucosa dry, PERRLA, EOMI, sclera anicteric  Neck:  Supple, trachea midline  Lungs:  respirations unlabored  Heart[de-identified]  NSR  Abd:  Soft, non distended  :  No rogers  Ext:  no edema  Skin:  pale appearing  Neuro: confused, agitated    Objective data reviewed: labs, images, records, medication use, vitals and chart      Time/Communication  Greater than 50% of time spent, total 25 minutes in counseling and coordination of care at the bedside/over the telephone regarding goals of care, symptom management, diagnosis and prognosis and see above. Thank you for allowing Palliative Medicine to participate in the care of Fatmata Espino. Provider Florina Collet PA-C  Palliative Medicine      Note: This report was completed using computerize voice recognition software. Every effort has been made to ensure accuracy; however, inadvertent computerized transcription errors may be present.

## 2021-01-05 NOTE — PROGRESS NOTES
Ok'd for pts daughter and ex wife to come and see him last night. Pt was very confused yesterday. Staff reported family came and stayed for approx 1 hour.

## 2021-01-05 NOTE — PROGRESS NOTES
Date: 2021       Patient Name: Chapo Mahan  : 1970      MRN: [id number]    Hold PT, per RN, pt medically unstable and not following commands.     Will follow up as appropriate    Beverly Knight PT, DPT  [id number]

## 2021-01-05 NOTE — DISCHARGE SUMMARY
Hospitalist Discharge Summary    Patient ID: Sophia Lucian   Patient : 1970  Patient's PCP: No primary care provider on file. Admit Date: 2020   Admitting Physician: Cassius Montana MD    Discharge Date:  2021   Discharge Physician: STEVE Brown NP   Discharge Condition: Stable  Discharge Disposition: [facility] course in brief:  (Please refer to daily progress notes for a comprehensive review of the hospitalization by requesting medical records)    Patient admitted on 2020 for severe back pain. He was found to have a left lung apex lesion with adenopathy and mets to the liver and spine. He was evaluated by neurosurgery who recommended palliative treatment to the spine. Palliative care team following as well for symptom management.  An EBUS was done and the left endobronchial mass and pathology is consistent with non-small cell lung cancer squamous cell carcinoma with carcinoid features. Hem/onc following and radiation started  for metastatic non-small cell squamous cancer. Ultimately the patient continued to decline and the decision was made by family to transition to comfort care and seek hospice evaluation. The patient will be discharged to the hospice house.      Consults:   IP CONSULT TO NEUROSURGERY  IP CONSULT TO FAMILY MEDICINE  IP CONSULT TO NEUROSURGERY  IP CONSULT TO ONCOLOGY  IP CONSULT TO PALLIATIVE CARE  IP CONSULT TO ONCOLOGY  IP CONSULT TO PULMONOLOGY  IP CONSULT TO RADIATION ONCOLOGY  INPATIENT CONSULT TO ORTHOTIST/PROSTHETIST  IP CONSULT TO PULMONOLOGY  IP CONSULT TO ONCOLOGY  IP CONSULT TO PULMONOLOGY  IP CONSULT TO RADIATION ONCOLOGY  IP CONSULT TO PALLIATIVE CARE  IP CONSULT TO GENERAL SURGERY  IP CONSULT TO SOCIAL WORK  IP CONSULT TO PSYCHIATRY  IP CONSULT TO PSYCHIATRY  IP CONSULT TO HOSPICE  IP CONSULT TO HOSPICE    Discharge Diagnoses:    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  At this time the patient is actively dying and comfort measures are in place. He is to transfer to the hospice house per family's request.     I have spoken with the patient and discussed the results of the current hospitalization, in addition to providing specific details for the plan of care and counseling regarding the diagnosis and prognosis. Activity: activity as tolerated    Significant labs:  CBC:   Recent Labs     01/03/21 0414 01/04/21  0538 01/05/21  0504   WBC 11.8* 10.7 13.4*   RBC 4.01 3.93 3.89   HGB 12.5 12.1* 12.2*   HCT 41.1 40.2 39.4   .5* 102.3* 101.3*   RDW 14.1 14.1 14.5    127* 148     BMP:   Recent Labs     01/03/21 0414 01/04/21  0538 01/05/21  0504   * 154* 159*   K 3.8 4.1 4.1   * 117* 119*   CO2 34* 31* 29   BUN 55* 49* 65*   CREATININE 1.0 0.9 1.1     LFT:  Recent Labs     01/03/21 0414 01/04/21  0538 01/05/21  0504   PROT 7.0 6.6 7.0   ALKPHOS 73 66 79   ALT 60* 56* 74*   AST 31 67* 108*   BILITOT 0.7 0.8 1.7*     Folate and B12:   Lab Results   Component Value Date    GAXQSAGL78 266 07/16/2017   ,   Lab Results   Component Value Date    FOLATE 7.7 07/16/2017       Urinalysis:    Lab Results   Component Value Date    NITRU Negative 12/12/2020    WBCUA 0-1 12/12/2020    BACTERIA MANY 12/12/2020    RBCUA NONE 12/12/2020    BLOODU Negative 12/12/2020    SPECGRAV 1.015 12/12/2020    GLUCOSEU Negative 12/12/2020       Imaging:  Xr Femur Left (min 2 Views)    Result Date: 12/13/2020  EXAMINATION: 4 XRAY VIEWS OF THE LEFT FEMUR 12/13/2020 7:55 pm COMPARISON: None.  HISTORY: ORDERING SYSTEM PROVIDED HISTORY: pain, metastatic lesion TECHNOLOGIST PROVIDED HISTORY: Patient reports acute onset pain with movement as outpatient, metastatic lesion seen r/o impending fracture Reason for exam:->pain, metastatic lesion What reading provider will be dictating this exam?->[facility] FINDINGS: Normal appearance of the superior and inferior pubic rami. Mild left hip joint space narrowing with acetabular spurring and sclerosis. Normal contour to the left femoral head. Lytic lesion in the left femoral neck. No evidence of pathologic fracture in this region. No cortical irregularities nor abnormal periosteal elevation along the course of the left femur. Osseous mineralization is otherwise normal.  No soft tissue abnormality. 1.  Lytic lesion in the left femoral neck. No additional evidence of osseous metastasis on this exam.  No evidence of pathologic fracture. 2.  Mild left hip joint osteoarthritis. Ct Chest W Contrast    Result Date: 12/12/2020  EXAMINATION: CT OF THE CHEST WITH CONTRAST 12/12/2020 9:36 pm TECHNIQUE: CT of the chest was performed with the administration of intravenous contrast. Multiplanar reformatted images are provided for review. Dose modulation, iterative reconstruction, and/or weight based adjustment of the mA/kV was utilized to reduce the radiation dose to as low as reasonably achievable. COMPARISON: None. HISTORY: ORDERING SYSTEM PROVIDED HISTORY: Possible malignancy TECHNOLOGIST PROVIDED HISTORY: Reason for exam:->Possible malignancy What reading provider will be dictating this exam?->[facility] FINDINGS: Mediastinum: Large heterogeneous left hilar mass measuring approximately 5.4 x 4.1 x 3.7 cm consistent with malignancy. There is occlusion of left upper lobe bronchus and severe bronchial narrowing to the left lower lobe. Associated mediastinal adenopathy in the prevascular space, pretracheal retrocaval space, aortic or pulmonary window, consistent with metastatic involvement. Largest prevascular node measures approximately 2.1 cm in diameter. No aortic dissection or aneurysmal dilatation. No pericardial effusion.  Lungs/pleura: Mildly spiculated nodular lesion, pleural based in the left lung apex which may represent satellite lesion measuring approximately 1.5 cm in diameter. Emphysematous changes in the lungs. No pleural effusions. Upper Abdomen: Hypodense hepatic lesions. See separate report of CT of the abdomen and pelvis. Soft Tissues/Bones: Large lytic lesion at the T8 vertebral body with paraspinal extension and intraspinal extension of malignancy. Pathologic compression deformity of T8. Large heterogeneous left hilar mass as described consistent with malignancy. Occlusion of left upper lobe bronchus and severe bronchial narrowing to the left lower lobe. Mildly spiculated nodular lesion, pleural based in the left lung apex likely satellite malignant lesion measuring 1.5 cm. Mediastinal adenopathy likely manifesting malignant involvement. Large metastatic lesion to the T8 vertebral body with paraspinal an intraspinal extension of malignancy and pathologic compression fracture of T8. Ct Lumbar Spine Wo Contrast    Result Date: 12/12/2020  EXAMINATION: CT OF THE LUMBAR SPINE WITHOUT CONTRAST  12/12/2020 TECHNIQUE: CT of the lumbar spine was performed without the administration of intravenous contrast. Multiplanar reformatted images are provided for review. Dose modulation, iterative reconstruction, and/or weight based adjustment of the mA/kV was utilized to reduce the radiation dose to as low as reasonably achievable. COMPARISON: None HISTORY: ORDERING SYSTEM PROVIDED HISTORY: pain TECHNOLOGIST PROVIDED HISTORY: Reason for exam:->pain What reading provider will be dictating this exam?->CRC FINDINGS: BONES/ALIGNMENT: There are lytic destructive changes in the L3 vertebral body, more so on the right. Lytic lesion extends to the right pedicle, transverse process and superior articular facet. There is pathological fracture involving the right lateral aspect of the L3 vertebral body, with approximately 60% loss of height. Soft tissue fullness extends into the adjacent psoas muscle.   The extent of epidural 1970 Age: 44 years Gender: Male Order Date:  12/14/2020 3:00 PM EXAM: CT NEEDLE BIOPSY LIVER PERCUTANEOUS, CT GUIDED NEEDLE PLACEMENT NUMBER OF IMAGES:  210 INDICATION:  liver biopsy liver biopsy What reading provider will be dictating this exam?->[facility] COMPARISON: None After obtaining informed consent and following the routine sterile prep and drape, a needle was inserted. Through this guide needle a biopsy needle was inserted. Two passes were made. Good specimen was obtained. Patient tolerated the procedure well. The procedure was performed under local anesthesia. . 10.7 seconds of fluoroscopy was utilized. A timeout was performed at 1509 hours . Patient was monitored for 60 minutes  by registered nurse. 2 mg of Versed and 100 mcg of fentanyl    Successful uncomplicated CT and fluoroscopic guided liver mass biopsy. If there are any physician concerns regarding this report, a Radiologist can be reached by calling the following number [phone]. Mri Thoracic Spine W Wo Contrast    Result Date: 12/13/2020  EXAMINATION: MRI OF THE THORACIC SPINE WITHOUT AND WITH CONTRAST  12/13/2020 2:51 pm TECHNIQUE: Multiplanar multisequence MRI of the thoracic spine was performed without and with the administration of intravenous contrast. COMPARISON: None HISTORY: ORDERING SYSTEM PROVIDED HISTORY: concern for malignancy TECHNOLOGIST PROVIDED HISTORY: Reason for exam:->concern for malignancy What reading provider will be dictating this exam?->CRC FINDINGS: BONES/ALIGNMENT: Enhancing metastatic lesions are seen in the T8 and T12 vertebral bodies. There is a pathological compression fracture deformity in the T8 vertebral body with approximately 40% loss of height. There is mild retropulsion of the fracture fragments, more so towards the left. Metastatic lesion extends into the left pedicle and left superior articular facet. At the level of the epidural tumor infiltration, the central canal is moderately stenotic.  The lesion along the left lateral aspect of the T12 vertebral body demonstrates no associated compression fracture or extraosseous spread. SPINAL CORD: No abnormal cord signal is seen. SOFT TISSUES:  Extraosseous spread of the tumor along the left anterior epidural space at T8 and extends into the left T8-9 neural foramina. DEGENERATIVE CHANGES: No significant spinal canal stenosis or neural foraminal narrowing of the thoracic spine. 1. Metastatic lesions in the T8 and T12 vertebral bodies, more prominent in the former. 2. Pathological fracture of the T8 vertebral body with approximately 40% loss of height. 3. Epidural extension of tumor along the left lateral aspect at T8, extending into the left T8-9 neural foramina. 4. Moderate central canal stenosis at T8. 5. No cord lesion or cord edema seen. Mri Lumbar Spine W Wo Contrast    Result Date: 12/13/2020  EXAMINATION: MRI OF THE LUMBAR SPINE WITHOUT AND WITH CONTRAST  12/13/2020 2:51 pm TECHNIQUE: Multiplanar multisequence MRI of the lumbar spine was performed without and with the administration of intravenous contrast. COMPARISON: CT of the lumbar spine, 12/12/2020 HISTORY: ORDERING SYSTEM PROVIDED HISTORY: metastatic lesions TECHNOLOGIST PROVIDED HISTORY: Reason for exam:->metastatic lesions What reading provider will be dictating this exam?->CRC FINDINGS: BONES/ALIGNMENT: There is a metastatic lesion in the L3 vertebral body with approximately 60% loss of height. There is mild metastatic infiltration along the superior endplate of the L4 vertebral body on the right, as well as the inferior aspect of the L2 vertebral body on the left. No pathological fractures are seen in the L3 and L4 vertebral bodies. SPINAL CORD:  The conus terminates normally. L1-L2: No mass or disc herniation. No significant central canal, lateral recess or neural foraminal stenoses.  L2-L3: Tumor infiltration is seen into the epidural space, extending into the right neural foramina, resulting in invasion and destruction of the right pedicle and superior articular facet. Tumor results in severe stenoses of the central canal, lateral recesses and right neural foramina. Tumor extends into the right lateral paraspinal space, resulting in displacement of the psoas muscle laterally. The enhancing tumor extends in the right paraspinal space from the level of L2 to the level of L5-S1. Along the inferior margin of the extraosseous tumor at the level of L4 and L5, there are multiple areas of cavitation within the mass. The largest cyst which cavity measures approximately 3.6 x 3.1 cm in the maximal axial plane. L3-L4: Minimal disc bulge is seen at this level. The central canal is developmentally stenotic Moderate central canal stenosis, with narrowing of the AP diameter of the thecal sac in the midsagittal plane to 7 mm. Moderate to severe lateral recess stenosis tumor infiltration in the right neural foramina resulting in severe stenosis. Moderate to severe left neural foraminal stenosis. L4-L5: No tumor is seen at this level. Prominent loss of disc height with small disc bulge. Mild facet and ligamentous hypertrophy. Mild central canal and lateral recess stenoses. Moderate neural foraminal stenoses. L5-S1: Minimal disc bulge. No tumor is seen at this level. Mild central canal stenosis. No significant central canal or lateral recess stenosis. Mild to moderate neural foraminal stenoses. MISCELLANEOUS: Partially included in the field of view of this study is a small metastatic lesion in the left iliac bone. 1. Metastatic lesion in the L3 vertebral body, extending into the right pedicle and superior articular facet. Pathological fracture of L3, with approximately 60% loss of height. 2. Metastatic infiltration along the endplates L3 and L4. No pathological fracture seen at these levels.  3. Infiltration of tumor into the epidural space at L3 resulting in [address] CANAL, LATERAL RECESSES AND THE RIGHT NEURAL FORAMINA. 4. Large right paraspinal mass is contiguous with the bony metastatic lesions and extends from the level of L2 to L5-S1. The mass exerts mass effect on the psoas, which is laterally displaced. Areas of cystic degeneration are seen right paraspinal mass at the level of L4 and L5. 5. Partially included in the field of view of this study is a small metastatic lesion in the left iliac bone    Ct Abdomen Pelvis W Iv Contrast Additional Contrast? None    Result Date: 12/12/2020  EXAMINATION: CT OF THE ABDOMEN AND PELVIS WITH CONTRAST 12/12/2020 9:36 pm TECHNIQUE: CT of the abdomen and pelvis was performed with the administration of intravenous contrast. Multiplanar reformatted images are provided for review. Dose modulation, iterative reconstruction, and/or weight based adjustment of the mA/kV was utilized to reduce the radiation dose to as low as reasonably achievable. COMPARISON: Correlation is made with the prior CT of the lumbar spine HISTORY: ORDERING SYSTEM PROVIDED HISTORY: r/o Psoas abscess TECHNOLOGIST PROVIDED HISTORY: Additional Contrast?->None Reason for exam:->r/o Psoas abscess What reading provider will be dictating this exam?->CRC FINDINGS: Lower Chest: The lung bases are clear. Organs: Hypodense lesion in the right lobe of the liver measuring approximately 3.5 cm in diameter with mild peripheral enhancement. Smaller hypodense lesion in the right lobe of the liver inferiorly measuring 1 cm in diameter. Findings most consistent with metastatic disease. The spleen, adrenals, are within normal limits. Mildly heterogeneous mass extending cephalad from the head of the pancreas, most consistent with pancreatic malignancy measuring approximately 2.4 x 3 x 3.2 cm in diameter. The pancreatic duct is not dilated. Adrenals and gallbladder within normal limits. Kidneys enhance homogeneously with no obstructive uropathy.  GI/Bowel: No evidence of bowel obstruction or free intraperitoneal air. No colitis or diverticulitis. Normal appendix. Pelvis: Moderately distended urinary bladder. No free fluid in the pelvis. No inguinal hernia. Peritoneum/Retroperitoneum: No abdominal aortic aneurysm. Bones/Soft Tissues: Lytic lesion involving the T8 vertebral body and extending to the left paraspinal space, and to the spinal canal likely impinging on the thoracic cord at this level. The lesion measures approximately 3.8 cm in diameter. Smaller lytic lesion in the T12 vertebral body measuring approximately 1.4 cm in diameter. Larger lesion at the L3 vertebral body with pathologic compression deformity of L3. The lesion extends to the paraspinal soft tissues anteriorly and to the right of L3 and extends into the spinal canal at this level. There is extension to the L2-L3 intervertebral disc space, and to the L3-L4 intervertebral disc space. There is involvement of the right pedicle and right articular facet of L3. Large component extending to the right iliopsoas muscle which is asymmetrically enlarged consistent with malignant involvement. This process extends caudally along the course of the right psoas muscle with hypodense component, with mild peripheral enhancement consistent with malignant involvement, versus associated psoas abscess, felt to be a less likely consideration, measuring approximately 8.1 cm in cephalocaudal dimension. Additional lytic lesion involving the proximal left femur extending to the femoral neck with associated soft tissue component measuring approximately 4.3 cm in diameter. Hypodense lesions in the right lobe of the liver, the larger measuring 3.5 cm in diameter and was consistent with metastatic disease. Mildly heterogeneous mass extending cephalad from the head of the pancreas, most suspicious for pancreatic malignancy measuring approximately 2.4 x 3 x 3.2 cm in diameter.  Multiple lytic lesions as described involving the T8 vertebral body, T12 vertebral body, L3 vertebral body, and proximal left femur. Pathologic compression fracture of L3 and extensive extension of malignant process to the paraspinal soft tissues anteriorly and to the right of L3, to the adjacent intervertebral discs, and spinal canal.  Extension into the right pedicle and right articular facet and large component extending to the right iliopsoas muscle which is asymmetrically enlarged. Hypodense process extending caudally along the course of the right psoas muscle with mild peripheral enhancement, likely malignant involvement versus associated abscess, felt to be a less likely consideration. Intraspinal extension of malignant process at the level of T8, and L3. Ct Needle Biopsy Liver Percutaneous    Result Date: 2020  Patient MRN:  [id number] : 1970 Age: 44 years Gender: Male Order Date:  2020 3:00 PM EXAM: CT NEEDLE BIOPSY LIVER PERCUTANEOUS, CT GUIDED NEEDLE PLACEMENT NUMBER OF IMAGES:  210 INDICATION:  liver biopsy liver biopsy What reading provider will be dictating this exam?->[facility] COMPARISON: None After obtaining informed consent and following the routine sterile prep and drape, a needle was inserted. Through this guide needle a biopsy needle was inserted. Two passes were made. Good specimen was obtained. Patient tolerated the procedure well. The procedure was performed under local anesthesia. . 10.7 seconds of fluoroscopy was utilized. A timeout was performed at 1509 hours . Patient was monitored for 60 minutes  by registered nurse. 2 mg of Versed and 100 mcg of fentanyl    Successful uncomplicated CT and fluoroscopic guided liver mass biopsy. If there are any physician concerns regarding this report, a Radiologist can be reached by calling the following number [phone]. Xr Eye Foreign Body    Result Date: 2020  EXAMINATION: TWO XRAY VIEWS OF THE ORBITS 2020 COMPARISON: None.  HISTORY: ORDERING SYSTEM PROVIDED HISTORY: Foreign body, eye, unspecified laterality, initial encounter TECHNOLOGIST PROVIDED HISTORY: Reason for exam:->Foreign body, eye, unspecified laterality, initial encounter What reading provider will be dictating this exam?->[facility] FINDINGS: No evidence of radiopaque foreign body within the orbits. No other acute abnormalities noted. No evidence of metallic foreign body within the orbits. Nm Bone Scan Whole Body    Result Date: 2020  Patient MRN: [id number] : 1970 Age:  44 years Gender: Male Order Date: 2020 8:36 AM Exam: NM BONE SCAN WHOLE BODY Number of Images: 5 views Indication:   lung ca, for staging lung ca, for staging What reading provider will be dictating this exam?->[facility] Comparison: MRI dated 2020 and x-ray dated 2020 Findings: Bone scan was performed following the injection of 28 mCi of MDP. Tracer uptake is seen compatible with degenerative changes There is increased tracer uptake identified involving the left femoral neck. There is increased tracer uptake involving the sternum. There is focal tracer uptake seen at the level of T8 on the right. There is focal tracer uptake seen at the level of approximately L3 on the left. There is faint tracer uptake seen at the level of T12. There is increased tracer uptake seen at the level of the sacrum. This may represent incompletely blocked bladder tracer as the bladder appears to be severely distended on previous studies The remaining biodistribution of radiotracer appears to be within normal limits    Findings compatible with degenerative changes Multiple foci of abnormal tracer uptake, consistent widespread metastatic disease. Mri Brain W Wo Contrast    Result Date: 2020  EXAMINATION: MRI OF THE BRAIN WITHOUT AND WITH CONTRAST  2020 7:19 am TECHNIQUE: Multiplanar multisequence MRI of the head/brain was performed without and with the administration of intravenous contrast. COMPARISON: None.  HISTORY: ORDERING SYSTEM PROVIDED HISTORY: newly diagnosed lung cancer, r/o brain mets TECHNOLOGIST PROVIDED HISTORY: Reason for exam:->newly diagnosed lung cancer, r/o brain mets What reading provider will be dictating this exam?->[facility] Initial evaluation. FINDINGS: INTRACRANIAL STRUCTURES/VENTRICLES:  There is no evidence of an acute infarct. There is an extra-axial enhancing lesion, which appears to span the right and left aspects of the mid falx (series 9, image 5). This measures approximately 14 x 11 x 16 mm (AP x TRANS x CC). No significant adjacent parenchymal edema is seen. No abnormal enhancement is otherwise seen. There is no significant mass effect or midline shift. Areas of T2 FLAIR hyperintensity are seen in the periventricular and subcortical white matter, which are nonspecific, but may represent chronic microvascular ischemic change. There is mild global parenchymal volume loss. The normal signal voids within the major intracranial vessels appear maintained. No acute abnormality in the sellar or suprasellar region. ORBITS: The visualized portion of the orbits demonstrate no acute abnormality. SINUSES: The visualized paranasal sinuses and mastoid air cells are well aerated. BONES/SOFT TISSUES: The bone marrow signal intensity appears normal. The soft tissues demonstrate no acute abnormality. 1. There is a small extra-axial enhancing mass, which appears to span the right and left aspects of the mid falx. This measures up to 16 mm. No significant adjacent parenchymal edema is seen. This may represent a meningioma. However, given history of malignancy, suggest a short-term follow-up examination in 3 months to evaluate for stability. 2. Otherwise, no acute intracranial abnormality. No acute infarct. 3. Mild global parenchymal volume loss with mild-to-moderate chronic microvascular ischemic changes.       Discharge Medications:      Medication List      STOP taking these medications    acetaminophen 500 MG tablet  Commonly known as: TYLENOL     amLODIPine 10 MG tablet  Commonly known as: NORVASC     busPIRone 10 MG tablet  Commonly known as: BUSPAR     carvedilol 25 MG tablet  Commonly known as: COREG     cyclobenzaprine 10 MG tablet  Commonly known as: FLEXERIL     folic acid 1 MG tablet  Commonly known as: FOLVITE     furosemide 40 MG tablet  Commonly known as: LASIX     gabapentin 100 MG capsule  Commonly known as: NEURONTIN     hydrALAZINE 25 MG tablet  Commonly known as: APRESOLINE     lidocaine 5 %  Commonly known as: Lidoderm     lisinopril 20 MG tablet  Commonly known as: PRINIVIL;ZESTRIL     mirtazapine 15 MG tablet  Commonly known as: REMERON     naproxen 500 MG tablet  Commonly known as: Naprosyn     pantoprazole 40 MG tablet  Commonly known as: PROTONIX     thiamine 100 MG tablet            Time Spent on discharge is more than 45 minutes in the examination, evaluation, counseling and review of medications and discharge plan.    +++++++++++++++++++++++++++++++++++++++++++++++++  Luis Alfredo Taylor, [address], [place]  +++++++++++++++++++++++++++++++++++++++++++++++++  NOTE: This report was transcribed using voice recognition software. Every effort was made to ensure accuracy; however, inadvertent computerized transcription errors may be present.

## 2021-01-05 NOTE — PROGRESS NOTES
[facility] OF THE [facility] Information Visit Note              Patient Name: Burgess Camacho   :  1970  MRN:  [id number]    Admit date:  2020    Admitted from: [facility] Admitting Physician:  Toro Blake MD   PCP:  No primary care provider on file. Primary Insurance: Pay[place]: [place]ICA[place] /  /  /    Secondary Insurance:  none    Emergency Contact:      Contact/Relation:   /         Phone:       Contact/Relation:   /     Phone:     Advance Directive  Advance directives received No  Patient has NOT completed an advance directive  and Patient does not have a documented healthcare surrogate  Discussed with: Family member  DPOA-HC Name-Relation:    Phone:       Terminal Diagnosis Metastatic Cancer as confirmed by Dr. Delphine Guerrero Problem List:   Patient Active Problem List   Diagnosis Code    Hyperkalemia E87.5    Non-traumatic rhabdomyolysis M62.82    Heroin abuse ([address] F11.10    Cocaine abuse ([address] F14.10    Opiate overdose ([address] T40.601A    Acute respiratory failure with hypoxia ([address] J96.01    Drug overdose T50.901A    Traumatic rhabdomyolysis ([address] T79. 6XXA    Severe episode of recurrent major depressive disorder, without psychotic features ([address] F33.2    Acute renal failure (HCC) N17.9    Leukocytosis D72.829    Transaminitis R74.01    Severe single current episode of major depressive disorder, without psychotic features ([address] F32.2    Pathologic lumbar vertebral fracture, initial encounter M84.48XA    Malignant neoplasm metastatic to bone (HCC) C79.51    Metastatic cancer to liver (HCC) C78.7    Lung cancer metastatic to bone (HCC) C34.90, C79.51    Lung mass R91.8    Hypernatremia E87.0       Code Status Order: DNR-CC     Past Medical History:        Diagnosis Date    Acute kidney injury ([address] 2017    Cocaine abuse ([address] 2017    Hepatitis C 1990    Heroin abuse ([address] 2017    Hyperkalemia 2017    Hypertension     Liver disease     Non-traumatic rhabdomyolysis 2017    Psychiatric problem      Past Surgical History:        Procedure Laterality Date    BRONCHOSCOPY N/A 2020    BRONCHOSCOPY DIAGNOSTIC OR CELL [address] ONLY performed by Chikis Villagran MD at [address]  2020    BRONCHOSCOPY BRUSHINGS performed by Chikis Villagran MD at [address]  2020    BRONCHOSCOPY/TRANSBRONCHIAL NEEDLE BIOPSY performed by Chikis Villagran MD at [address]  2020    BRONCHOSCOPY/TRANSBRONCHIAL LUNG BIOPSY performed by Chikis Villagran MD at [address] NEEDLE BIOPSY LIVER PERCUTANEOUS  2020    CT NEEDLE BIOPSY LIVER PERCUTANEOUS 2020 Jayro Ivory MD [facility] CT    HERNIA REPAIR         Allergies:  Patient has no known allergies. Family Goal: Comfort measures       Meeting held with [facility]    Referral received. Chart reviewed. Placed a call to [facility]. Family was working with another hospice agency trying to get patient moved to [place] to be closer to family. Patient has continued to decline and unsafe to transfer that distance. Discussed inpatient level of care hospice with [facility]. Reviewed if he were to stabilize the need for a dishcarge plan. [facility] was understanding. She would like her father evaluated for hospice house. Discharge Plan:  Discharge Disposition; unknown  Facility Name: unknown    [facility] plan:  1. Evaluate for GIP  2. Please call Yun Al [phone] with any questions. 3. Patient not currently under the care of hospice.     Electronically signed by Loretta Osorio RN on 2021 at 3:21  [facility]    GIP ASSESSMENT NOTE      Patient Name: Erick Every   :  1970  MRN:  [id number]  Today's Date: 2021      Current Code Status: DNR-CC  Terminal diagnosis: Metastatic cancer to bone, liver, and lung     Pain Assessment:  constant    Location: generalized    Severity Awake of severe  Character: unknown  Pain with movement: yes  Current medication regimen for pain: methadone 10mg TID, morphine SL 15mg  Number of PRN doses in the last 72 hrs: Methadone has been held since yesterday afternoon. Patient was on IV morphine 5mg had  2 doses and 1 dose of 2mg of morphine. Lost IV and on SL morphine had one doese  Effectiveness minimal  Comments:     Respiratory Assessment: positive for - shortness of breath and tachypnea Respiratory Rate:28  Dyspnea Yes   Current O2 Status:  0 liters/min via room air     No  Intractable cough   No  Audible gurgling   No  Unmanaged secretions as evidenced by foaming, symptoms of discomfort    Current medication regimen for respiratory:  Ativan 2mg SL and morphine 15mg SL  Number of PRN doses in last 72 hrs: was on po then IV ativan, lost iv and cognitive decline now on SL ativan. Had 2 doses PO and 1 dose IV, 1 dose SL  Effective  minimal   Comments:     Nausea Assessment: no nausea and no vomiting     Agitation/Anxiety/Restless Assessment: Yes   Frequency: constant per day. Last Occurred: current  Current medication regimen for agitation/anxiety: Ativan     PRN Doses in the last 72 hrs See above            Effective minimal  Comments:       Other Symptoms of Higher Acuity Care requiring frequent skilled nursing/physician interventions  :  Yes   Symptom descriptions:     Stanislav Francis is a 44year old male. Admitted for back pain on 12/16. Found to have metastatic cancer to lung, bone, and liver. Had a dose of palliative radiation to the spine. He had further decline and is now minimally responsive. He has a history of drug and alcohol use. He had required significant pain medication to keep him comfortable as seen above. Loss of IV access and inability to swallow complicates his course. He was moving around in bed, grimacing and having cheyne-selby respirations upon assessment.      Discussed symptoms with Paris Barba and has determined patient is

## 2021-01-05 NOTE — PROGRESS NOTES
Palliative Care Department  [phone]  Palliative Care Progress Note  Provider Mickey Michaud  [id number]  Hospital Day: 25    Referring Provider: Pacheco Polanco MD  Palliative Medicine was consulted for assistance with: Symptom management, goals of care, CODE STATUS and family support    Brief summary:  Erick North is a 44 y.o. who presented to the hospital with chief complaints of back pain and was admitted 12/12/2020 with diagnosis(ses) of malignant neoplasm metastatic to bone, lung mass, pathologic fracture of vertebrae due to neoplastic disease. Patient has started workup for malignancy. ASSESSMENT/PLAN:   Pertinent hospital diagnoses:  1. Metastatic non-small cell lung cancer, squamous type   -Liver and lung biopsy positive for malignancy, pathology and cytology reviewed  -Radiation oncology following for palliative radiation to the spine  - MRI brain small extra-axial enhancing mass- meningioma vs metastasis  - bone scan shows widespread metastatic disease     2.  Pain associated with neoplasm  - lost IV access  - not taking oral  -Started sublingual comfort meds     3. GI-constipation resolved  - senna-s 2 tabs bid  - miralax 17 gm daily PRN     4.  Spinal cord stenosis secondary to tumor infiltration  -Receiving steroids  -Significant concerns with cord compression and worsening neurologic status, receiving palliative radiation per Dr. Kelechi Tanner. PALLIATIVE CARE ENCOUNTER  - Capacity: At this time, Erick North, Does Not have capacity for medical decision-making.   Capacity is time limited and situation/question specific  - Outcome of goals of care meeting: Consult to psych, provide advanced directives  - Code Status:   full  - Advanced directives: None  - Surrogate decision maker/Legal NOK:     Contacts:    Vick Handler [phone]   Son Rock Ruelas [phone]   Daughter Dwaine Read [phone]  - Spiritual assessment: none  - Bereavement and grief: diagnosis and young age    - DISPO:  Nursing facility    Referrals to: hospice house    Subjective   Chart reviewed  Discussed with RN    Patient laying face in bed, moaning, tachypneic, grayish tone to skin, appears uncomfortable. Per bedside nurse lost IV access and haven't been able to give oral or IV pain meds. Discussed with IM team, plan for sublingual comfort meds and assess for GIP with hospice. Inpatient medications reviewed: yes  Home Medications reviewed: yes    OBJECTIVE:   Prognosis: Poor    Physical Exam:  BP (!) 138/100   Pulse 79   Temp 99 °F (37.2 °C) (Temporal)   Resp 20   Ht 5' 9\" (1.753 m)   Wt 195 lb (88.5 kg)   SpO2 97%   BMI 28.80 kg/m²   Gen:  Appears older than stated age, NAD  HEENT:  Normocephalic, atraumatic, mucosa dry, EOMI, sclera anicteric  Neck:  Supple, trachea midline, no JVD  Lungs:  tachypneic, increased work of breathing  Heart[de-identified]  normal S1S2, regular rate and rhythm  Abd:  Soft, non tender, non distended, bowel sounds present  :  No rogers  Ext:  no edema noted on visible extremities  Skin:   erythema of right arm, and trunk, gray tone to skin  Neuro:  confused, moaning, restless    Objective data reviewed: labs, images, records, medication use, vitals and chart    Time/Communication  Greater than 50% of time spent, total 25 minutes in counseling and coordination of care at the bedside/over the telephone regarding goals of care, symptom management, diagnosis and prognosis and see above. Thank you for allowing Palliative Medicine to participate in the care of April Green.       Provider [facility]

## 2021-01-05 NOTE — PROGRESS NOTES
Hospitalist Progress Note      SYNOPSIS: Patient admitted on 12/12/2020 for severe back pain. He was found to have a left lung apex lesion with adenopathy and mets to the liver and spine. He was evaluated by neurosurgery who recommended palliative treatment to the spine. Palliative care team following as well for symptom management.  An EBUS was done and the left endobronchial mass and pathology is consistent with non-small cell lung cancer squamous cell carcinoma with carcinoid features. Hem/onc following and radiation started 12/22 for metastatic non-small cell squamous cancer. 12/29: patient found in bed yesterday licking hand covered in stool. PT unable to ambulate patient, he refuses to wear brace despite education. Palliative continues to follow. Last [facility] 10/24 on 12/23, discharge plan is for [facility].   12/30: seen by psych, medications adjusted. Palliative has been in contact with son in regards to hospice. Family has declined hospice and chemo. They would like the him to be sent to a facility close to them in [place]. 12/31: discharge planning per   1/1/21: labs are improving, vitals stable. Added protein modular to ONS, albumin is low. AMPAC yesterday 8/24, worse than previous  1/2: found to have hypernatremia at 153, IV 0.45 NS ordered. IV team called for IV start. Spoke with the son Natalia Sykes ([place]), he does not want his father to have chemo at this point and is considering transferring into hospice. 1/3: family has decided to transition the patient into hospice care. Na remains elevated, IVFs adjusted. 1/4: Na remains elevated, slightly improved. [facility] contacted for referral, looking for facility near [place]. I placed call to daughter to inform her of patient's condition, she and her mother will be making the trip here today; they should be here this evening. 1/5: [facility] declined referral. Family was able to visit yesterday evening. Cheyne-selby breathing. Agitation. Spoke with Dr. Johanne Jansen from palliative. Will change comfort meds to PO given loss of IV acess. Hospital day 20     SUBJECTIVE:    Stable overnight. No issues reported. Patient seen and examined  Records reviewed. Temp (24hrs), Av.3 °F (36.8 °C), Min:97.8 °F (36.6 °C), Max:99 °F (37.2 °C)    DIET: DIET GENERAL;  Dietary Nutrition Supplements: Standard High Calorie Oral Supplement, Protein Modular  CODE: DNR-CC    Intake/Output Summary (Last 24 hours) at 2021 0711  Last data filed at 2021 0523  Gross per 24 hour   Intake 0 ml   Output --   Net 0 ml       OBJECTIVE:    BP (!) 138/100   Pulse 79   Temp 99 °F (37.2 °C) (Temporal)   Resp 20   Ht 5' 9\" (1.753 m)   Wt 195 lb (88.5 kg)   SpO2 97%   BMI 28.80 kg/m²     General appearance: Obtunded. Agitated. HEENT: Pupils equal, round, and reactive to light. Conjunctivae/corneas clear. Neck: Supple, with full range of motion. No jugular venous distention. Trachea midline. Respiratory: agonal respirations. Rhonchi throughout. Cardiovascular: Tachycardic with a regular rhythm. Normal S1/S2 without murmurs, rubs or gallops. Abdomen: Soft, non-tender, non-distended with hypoactive bowel sounds. Musculoskeletal: No clubbing, cyanosis or edema bilaterally. Full range of motion without deformity. Skin: Skin color, texture, turgor normal.   Psychiatric: Eyes open. Unable to follow commands. Agitated.   Capillary Refill: Brisk,< 3 seconds   Peripheral Pulses: +2 palpable, equal bilaterally     Medications:  REVIEWED DAILY    Infusion Medications    dextrose 5% and 0.45% NaCl with KCl 20 mEq 100 mL/hr at 211     Scheduled Medications    dexamethasone  4 mg Intravenous Q12H    pamidronate (AREDIA) IVPB  90 mg Intravenous Once    melatonin  3 mg Oral QPM    divalproex  125 mg Oral 2 times per day    methadone  10 mg Oral 3 times per day    sodium chloride flush  10 mL Intravenous 2 times per day    gabapentin  400 mg Oral TID   Salguero mirtazapine  15 mg Oral Nightly     PRN Meds: morphine **AND** morphine, polyethylene glycol, sodium chloride flush, LORazepam, LORazepam, gadobenate dimeglumine, bisacodyl, acetaminophen, sodium chloride flush    Labs:     Recent Labs     01/03/21 0414 01/04/21  0538 01/05/21  0504   WBC 11.8* 10.7 13.4*   HGB 12.5 12.1* 12.2*   HCT 41.1 40.2 39.4    127* 148       Recent Labs     01/03/21 0414 01/04/21  0538 01/05/21  0504   * 154* 159*   K 3.8 4.1 4.1   * 117* 119*   CO2 34* 31* 29   BUN 55* 49* 65*   CREATININE 1.0 0.9 1.1   CALCIUM 11.9* 11.4* 11.9*       Recent Labs     01/03/21 0414 01/04/21  0538 01/05/21  0504   PROT 7.0 6.6 7.0   ALKPHOS 73 66 79   ALT 60* 56* 74*   AST 31 67* 108*   BILITOT 0.7 0.8 1.7*     Chronic labs:    Lab Results   Component Value Date    INR 1.1 12/14/2020       Radiology: REVIEWED DAILY    ASSESSMENT:  Principal Problem:    Lung mass  Active Problems:    Pathologic lumbar vertebral fracture, initial encounter    Malignant neoplasm metastatic to bone [facility])    Metastatic cancer to liver (HCC)    Lung cancer metastatic to bone (HCC)    Hypernatremia  Resolved Problems:    * No resolved hospital problems. *      PLAN:    Comfort measures--pain and agitation control, bowel regimen. Switch to PO.      DISPOSITION: Hospice, discharge planning in progress.    +++++++++++++++++++++++++++++++++++++++++++++++++  [address], [place]  +++++++++++++++++++++++++++++++++++++++++++++++++  NOTE: This report was transcribed using voice recognition software. Every effort was made to ensure accuracy; however, inadvertent computerized transcription errors may be present.

## 2021-01-05 NOTE — PLAN OF CARE
Problem: Pain:  Goal: Control of acute pain  Description: Control of acute pain  Outcome: Met This Shift     Problem: Falls - Risk of:  Goal: Will remain free from falls  Description: Will remain free from falls  Outcome: Met This Shift     Problem: Falls - Risk of:  Goal: Absence of physical injury  Description: Absence of physical injury  Outcome: Met This Shift     Problem: Safety:  Goal: Free from accidental physical injury  Description: Free from accidental physical injury  Outcome: Met This Shift     Problem: Safety:  Goal: Free from intentional harm  Description: Free from intentional harm  Outcome: Met This Shift     Problem: Injury - Risk of, Physical Injury:  Goal: Will remain free from falls  Description: Will remain free from falls  Outcome: Met This Shift     Problem: Injury - Risk of, Physical Injury:  Goal: Absence of physical injury  Description: Absence of physical injury  Outcome: Met This Shift

## 2021-01-05 NOTE — PROGRESS NOTES
Occupational Therapy  OT BEDSIDE TREATMENT NOTE      Date:2021  Patient Name: Sofía Pacheco  MRN: [id number]  : 1970  Room: [address] [place] [address]-A       Pt's chart reviewed and treatment attempted, per nurse pt not medically stable or able to participate in therapy this date due to confusion. Will attempt at a later time/date.       Bette Umana

## 2021-01-12 NOTE — PROGRESS NOTES
DEPARTMENT OF RADIATION ONCOLOGY ON TREATMENT VISIT         12/30/20    NAME:  Rukhsana Chiang    YOB: 1970    Diagnosis: met ca bone    SUBJECTIVE:   Abida Jeong has now received fractionated external beam radiation therapy - ongoing. Past medical, surgical, social and family histories reviewed and updated as indicated. Pain: not well controlled    ALLERGIES:  Patient has no known allergies. No current outpatient medications on file. No current facility-administered medications for this encounter. OBJECTIVE:  Alert and fully ambulatory. Pleasant and conversant. Physical Examination: General appearance - alert, well appearing, and in no distress. Wt Readings from Last 3 Encounters:   12/13/20 195 lb (88.5 kg)   12/04/20 212 lb (96.2 kg)   07/28/17 225 lb 11.2 oz (102.4 kg)         ASSESSMENT/PLAN:     Patient is tolerating treatments well with expected toxicities. RBA were reviewed prior to first fraction and PRN. Current and planned dose reviewed. Goals of treatment and potential side effects were reviewed with the patient PRN. Treatment imaging has been personally reviewed for accuracy and precision. Questions answered to apparent satisfaction. Treatments will continue as planned. Ash Atkinson MD MS MARBELLAR  Radiation Oncologist        PHYSICIANS [facility] ([address] [place] [address]): [phone] /// FAX: [phone]  [facility]): [phone] /// FAX: [phone]  [facility]): [phone] /// FAX: [phone]

## 2021-01-18 LAB
FUNGUS (MYCOLOGY) CULTURE: ABNORMAL
FUNGUS STAIN: ABNORMAL
ORGANISM: ABNORMAL

## 2021-02-02 LAB
AFB CULTURE (MYCOBACTERIA): NORMAL
AFB SMEAR: NORMAL

## 2024-04-18 NOTE — PROGRESS NOTES
April 18, 2024      [facility] [address] [place] [address]  Phone: [phone]  Fax: [phone]       Patient: Mariam Diaz   YOB: 1984  Date of Visit: 04/18/2024    To Whom It May Concern:    Vamshi Diaz  was at [facility] on 04/18/2024. The patient may return to work/school on 4/20/24 with no restrictions. If you have any questions or concerns, or if I can be of further assistance, please do not hesitate to contact me.    Sincerely,    YENY Lora DNP, FNP-C      Inpatient Medical Oncology Progress Note    Subjective:  No fever. No nausea, going to liver biopsy. Objective:  /77   Pulse 88   Temp 98.3 °F (36.8 °C) (Temporal)   Resp 22   Ht 5' 9\" (1.753 m)   Wt 195 lb (88.5 kg)   SpO2 98%   BMI 28.80 kg/m²   GENERAL: Alert, oriented x 3, not in acute distress. HEENT: PERRLA; EOMI. Oropharynx clear. NECK: Supple. No palpable lymphadenopathy. LUNGS: Decreased air entry bilaterally  CARDIOVASCULAR: Regular rhythm, tachycardic. ABDOMEN: Soft. Non-tender, non-distended. Positive bowel sounds. EXTREMITIES: Without clubbing, cyanosis, or edema. NEUROLOGIC: No focal deficits.    ECOG PS 1    Diagnostics:  Lab Results   Component Value Date    WBC 11.1 12/14/2020    HGB 14.3 12/14/2020    HCT 41.7 12/14/2020    MCV 92.1 12/14/2020     12/14/2020     Lab Results   Component Value Date     12/14/2020    K 4.4 12/14/2020    CL 94 (L) 12/14/2020    CO2 27 12/14/2020    BUN 33 (H) 12/14/2020    CREATININE 1.1 12/14/2020    GLUCOSE 132 (H) 12/14/2020    CALCIUM 10.3 (H) 12/14/2020    PROT 7.3 12/14/2020    LABALBU 3.8 12/14/2020    BILITOT 0.6 12/14/2020    ALKPHOS 68 12/14/2020    AST 13 12/14/2020    ALT 19 12/14/2020    LABGLOM >60 12/14/2020    GFRAA >60 12/14/2020     Lab Results   Component Value Date    .7 (H) 12/14/2020     ca19-9 pending    Impression/Plan:  57-year-old male patient, who had presented with worsening back pain, he was found to have metastatic disease, he has a large heterogeneous left hilar mass measuring about 5.4 cm, with occlusion of the left upper lobe bronchus with severe bronchial narrowing to the left lower lobe, left lung apex lesion measuring 1.5 cm, mediastinal adenopathy, metastasis to the liver, possibly the pancreas and the spine,MRI of the thoracic spine had revealed metastatic lesions in the T8 and T12 vertebral bodies, pathologic fracture of the T8 vertebral body with approximately 40% loss of height, epidural extension of tumor along the left lateral aspect of the T8, extending into the left T8-9 neural foramina, moderate central canal stenosis at T8, no cord lesion or cord edema seen, he also has a destructive lesion along the right lateral aspect of the L3 vertebral body, lumbar spine MRI and occasional L3 vertebral body extending into the right pedicle and superior articular facet. Metastatic infiltration along the endplates of L3 and L4. No pathologic fracture seen at these levels. Infiltration of tumor into the epidural space at L3 resulting in severe stenosis of the central canal and lateral recesses in the right neural foramina. Large right paraspinal mass contiguous with the bony metastatic lesions that extends from the level of L2 to L5/S1 mass exerts mass-effect on the psoas which is laterally displaced. Small metastatic lesion in the left iliac bone. the patient has a probable lung cancer, he has a widely metastatic disease. MRI Brain There is a small extra-axial enhancing mass, which appears to span the right and left aspects of the mid falx.  This measures up to 16 mm.  No significant adjacent parenchymal edema is seen.  This may represent a meningioma. Evaluated by neurosurgery, no surgical interventions were recommended. Consulted Rad Onc for palliative RT to the spine. Pulmonary on board, bronchoscopy EBUS  Palliative care on board. Pancreatic mass, probably from metastatic disease,hepatobiliary on board; liver biopsy today 12/14/2020. Path pending. Will continue to follow.     12/14/2020  Kerry Whatley MD

## 2024-07-23 NOTE — PLAN OF CARE
Problem: Falls - Risk of:  Goal: Will remain free from falls  Description: Will remain free from falls  Outcome: Met This Shift  Goal: Absence of physical injury  Description: Absence of physical injury  Outcome: Met This Shift     Problem: Safety:  Goal: Free from accidental physical injury  Description: Free from accidental physical injury  Outcome: Met This Shift  Goal: Free from intentional harm  Description: Free from intentional harm  Outcome: Met This Shift     Problem: Daily Care:  Goal: Daily care needs are met  Description: Daily care needs are met  Outcome: Met This Shift     Problem: Skin Integrity:  Goal: Will show no infection signs and symptoms  Description: Will show no infection signs and symptoms  Outcome: Met This Shift     Problem: Injury - Risk of, Physical Injury:  Goal: Will remain free from falls  Description: Will remain free from falls  Outcome: Met This Shift  Goal: Absence of physical injury  Description: Absence of physical injury  Outcome: Met This Shift     Problem: Sleep Pattern Disturbance:  Goal: Appears well-rested  Description: Appears well-rested  Outcome: Met This Shift     Problem: Pain:  Goal: Pain level will decrease  Description: Pain level will decrease  Outcome: Ongoing  Goal: Control of acute pain  Description: Control of acute pain  Outcome: Ongoing  Goal: Control of chronic pain  Description: Control of chronic pain  Outcome: Ongoing     Problem: Skin Integrity:  Goal: Skin integrity will stabilize  Description: Skin integrity will stabilize  Outcome: Ongoing     Problem: Skin Integrity:  Goal: Absence of new skin breakdown  Description: Absence of new skin breakdown  Outcome: Ongoing     Problem: Confusion - Acute:  Goal: Absence of continued neurological deterioration signs and symptoms  Description: Absence of continued neurological deterioration signs and symptoms  Outcome: Ongoing  Goal: Mental status will be restored to baseline  Description: Mental status will You were seen in the emergency department for evaluation of chest pain, palpitations, and opioid withdrawal. Thankfully, no sign of organ damage, heart attack, thyroid problems.      Congratulations in deciding to stop using opioids.  The withdrawal can be quite uncomfortable.  I prescribed you a medication called Zofran that you can use as needed for nausea or vomiting.  I also prescribed you medication called hydroxyzine that you can use as needed up to 3 times a day for anxiety.  Please take these medications as prescribed.  For pain you can also take Tylenol and ibuprofen as needed.    You may take 600 mg of ibuprofen (Advil) every 6 hours and 1000 mg acetaminophen (Tylenol) every 6 hours for pain. Do not take more than 3200 mg of ibuprofen (Advil) in 24 hours. Do not take more than 4000 mg of acetaminophen (Tylenol) in 24 hours. You may take this much for 1-3 days, then only use as needed.     I placed a financial resource worker referral for you.  This be reaching out to you in the next 2-3 business days with insurance information.  I also placed a referral for primary care for you.  If you call and schedule follow-up appointment.    Chemical Dependency Treatment    [facility]  [email or web address]    [phone] or [phone]    [facility]    Services include screening assessments, medically supervised detoxification, inpatient and outpatient evaluation and referral, combined inpatient to outpatient treatment sequences, family counseling and aftercare.         [place] [facility]  www.[email or web address]    [phone] [address] [facility]    Serves adults and teens (minimum age is 16); has short-term treatment and a long-term recovery program; uses 12-step, cognitive behavioral therapy, motivational enhancement; faithbased, and recovery management; high school on-site and [employer] programming available         [facility]  [place]  [email or web address]    [phone]    Women s Programs  [facility] [facility]        [facility]  www.[email or web address]    [phone]    [address] [facility]         [facility] (Inpatient & Outpatient)    [email or web address]    [phone]    Phone consultation available 24/7    In-person Assessments    [address] [facility], [address] [address]    [address] [place] [address]    [address] [place], [place] [address] [place] [address] [address] [place] [address] (Inpatient & Outpatient)    [email or web address]-[email or web address]    [place], [place]    Contact  for Chemical Health Evaluation, [phone]    Funded by: Rule 25 in [facility]. Medical Assistance ([place]) providers of [facility], [employer], Blue Cross, [employer], [employer], Medicare A&B. Private insurance reviewed case by case.         [facility] ([place]-Based Inpatient & Outpatient)    http://[email or web address]    [phone]    [facility] [place] [address]         [facility]    [email or web address]//[email or web address]-health/[email or web address].html    [phone]    Alcohol and Drug Abuse Program - [address]  [place] [address]    Assessments are available during the day and on a walk-in basis Monday-Friday starting at 8am.         [facility]    [phone]    [email or web address]    [address] [place] [address]         [facility] (Residential & Outpatient)    [email or web address]    Women s Programs: [phone], [address] [place] [address]         [facility] (Does Rule 25 Assessments)    [email or web address].org/    [phone]    [address] [place] [address], [address], BRYANT Monk  be restored to baseline  Outcome: Ongoing     Problem: Discharge Planning:  Goal: Ability to perform activities of daily living will improve  Description: Ability to perform activities of daily living will improve  Outcome: Ongoing  Goal: Participates in care planning  Description: Participates in care planning  Outcome: Ongoing     Problem: Mood - Altered:  Goal: Mood stable  Description: Mood stable  Outcome: Ongoing  Goal: Absence of abusive behavior  Description: Absence of abusive behavior  Outcome: Ongoing  Goal: Verbalizations of feeling emotionally comfortable while being cared for will increase  Description: Verbalizations of feeling emotionally comfortable while being cared for will increase  Outcome: Ongoing [id number]         [place]    [email or web address]://[email or web address]    [phone]    [address] [place] [address]    [address] [address]    Rule 25 Assessments, Substance Abuse Assessments, Men s & Women s Programs         [facility]    [email or web address]/our-services/drug-[email or web address]-treatment    [phone], [address], [place], [place] [id number] [phone], [address], [place], [place] [address] [phone], [address] [facility], [address], [place], [place] [address] [phone], [address], ([facility] [place], [place] [id number] [phone], [address] [place] [address]         [employer] [place] on Drug Abuse    [email or web address]://www.drugabuse.gov/nidamed-medical-health-professionals        Return to the emergency department for new/worsening chest pain, shortness of breath, any other concerning symptoms.

## (undated) DEVICE — SOLUTION IV IRRIG 500ML 0.9% SODIUM CHL 2F7123

## (undated) DEVICE — Z DISCONTINUED NO SUB IDED NEEDLE BX REPL AUTOCLV FOR NA2C1

## (undated) DEVICE — SINGLE USE SUCTION VALVE MAJ-209: Brand: SINGLE USE SUCTION VALVE (STERILE)

## (undated) DEVICE — SURGICAL PROCEDURE PACK BRONCH

## (undated) DEVICE — BRUSH CYTO L140CM DIA1.5MM WRK CHN 2MM BRIST SHTH RNG HNDL

## (undated) DEVICE — ADAPTER TBNG DIA15MM SWVL FBROPT BRONCHSCP TERM 2 AXIS PEEP

## (undated) DEVICE — SINGLE USE ASPIRATION NEEDLE: Brand: SINGLE USE ASPIRATION NEEDLE

## (undated) DEVICE — FORCEPS BX L100CM DIA1.8MM WRK CHN 2MM PULM S STL RAD JAW 4

## (undated) DEVICE — TRAP,MUCUS SPECIMEN, 80CC: Brand: MEDLINE

## (undated) DEVICE — SINGLE USE BIOPSY VALVE MAJ-210: Brand: SINGLE USE BIOPSY VALVE (STERILE)